# Patient Record
Sex: FEMALE | Race: ASIAN | NOT HISPANIC OR LATINO | Employment: OTHER | URBAN - METROPOLITAN AREA
[De-identification: names, ages, dates, MRNs, and addresses within clinical notes are randomized per-mention and may not be internally consistent; named-entity substitution may affect disease eponyms.]

---

## 2023-11-24 ENCOUNTER — HOSPITAL ENCOUNTER (EMERGENCY)
Facility: HOSPITAL | Age: 23
Discharge: HOME | End: 2023-11-25
Attending: EMERGENCY MEDICINE
Payer: MEDICAID

## 2023-11-24 DIAGNOSIS — O20.0 THREATENED MISCARRIAGE: Primary | ICD-10-CM

## 2023-11-24 PROCEDURE — 96360 HYDRATION IV INFUSION INIT: CPT

## 2023-11-24 PROCEDURE — 25000003 PHARM REV CODE 250: Performed by: NURSE PRACTITIONER

## 2023-11-24 PROCEDURE — 85025 COMPLETE CBC W/AUTO DIFF WBC: CPT | Performed by: NURSE PRACTITIONER

## 2023-11-24 PROCEDURE — 81025 URINE PREGNANCY TEST: CPT | Performed by: NURSE PRACTITIONER

## 2023-11-24 PROCEDURE — 99284 EMERGENCY DEPT VISIT MOD MDM: CPT | Mod: 25

## 2023-11-24 PROCEDURE — 81001 URINALYSIS AUTO W/SCOPE: CPT | Performed by: NURSE PRACTITIONER

## 2023-11-24 PROCEDURE — 86901 BLOOD TYPING SEROLOGIC RH(D): CPT | Performed by: NURSE PRACTITIONER

## 2023-11-24 PROCEDURE — 80053 COMPREHEN METABOLIC PANEL: CPT | Performed by: NURSE PRACTITIONER

## 2023-11-24 PROCEDURE — 84702 CHORIONIC GONADOTROPIN TEST: CPT | Performed by: NURSE PRACTITIONER

## 2023-11-24 PROCEDURE — 96361 HYDRATE IV INFUSION ADD-ON: CPT

## 2023-11-24 RX ADMIN — SODIUM CHLORIDE 1000 ML: 0.9 INJECTION, SOLUTION INTRAVENOUS at 11:11

## 2023-11-25 VITALS
HEART RATE: 88 BPM | TEMPERATURE: 99 F | OXYGEN SATURATION: 100 % | SYSTOLIC BLOOD PRESSURE: 117 MMHG | HEIGHT: 61 IN | BODY MASS INDEX: 26.43 KG/M2 | WEIGHT: 140 LBS | RESPIRATION RATE: 17 BRPM | DIASTOLIC BLOOD PRESSURE: 76 MMHG

## 2023-11-25 LAB
ABO + RH BLD: NORMAL
ALBUMIN SERPL BCP-MCNC: 4.8 G/DL (ref 3.5–5.2)
ALP SERPL-CCNC: 47 U/L (ref 55–135)
ALT SERPL W/O P-5'-P-CCNC: 52 U/L (ref 10–44)
ANION GAP SERPL CALC-SCNC: 9 MMOL/L (ref 8–16)
AST SERPL-CCNC: 28 U/L (ref 10–40)
B-HCG UR QL: POSITIVE
BACTERIA #/AREA URNS HPF: NEGATIVE /HPF
BASOPHILS # BLD AUTO: 0.08 K/UL (ref 0–0.2)
BASOPHILS NFR BLD: 0.6 % (ref 0–1.9)
BILIRUB SERPL-MCNC: 0.3 MG/DL (ref 0.1–1)
BILIRUB UR QL STRIP: NEGATIVE
BUN SERPL-MCNC: 11 MG/DL (ref 6–20)
CALCIUM SERPL-MCNC: 10 MG/DL (ref 8.7–10.5)
CHLORIDE SERPL-SCNC: 103 MMOL/L (ref 95–110)
CLARITY UR: CLEAR
CO2 SERPL-SCNC: 25 MMOL/L (ref 23–29)
COLOR UR: COLORLESS
CREAT SERPL-MCNC: 0.7 MG/DL (ref 0.5–1.4)
CTP QC/QA: YES
DIFFERENTIAL METHOD: ABNORMAL
EOSINOPHIL # BLD AUTO: 0.4 K/UL (ref 0–0.5)
EOSINOPHIL NFR BLD: 3 % (ref 0–8)
ERYTHROCYTE [DISTWIDTH] IN BLOOD BY AUTOMATED COUNT: 12.3 % (ref 11.5–14.5)
EST. GFR  (NO RACE VARIABLE): >60 ML/MIN/1.73 M^2
GLUCOSE SERPL-MCNC: 87 MG/DL (ref 70–110)
GLUCOSE UR QL STRIP: NEGATIVE
HCG INTACT+B SERPL-ACNC: 8896.12 MIU/ML
HCT VFR BLD AUTO: 42.6 % (ref 37–48.5)
HGB BLD-MCNC: 14.8 G/DL (ref 12–16)
HGB UR QL STRIP: ABNORMAL
HYALINE CASTS #/AREA URNS LPF: 1 /LPF
IMM GRANULOCYTES # BLD AUTO: 0.07 K/UL (ref 0–0.04)
IMM GRANULOCYTES NFR BLD AUTO: 0.5 % (ref 0–0.5)
KETONES UR QL STRIP: NEGATIVE
LEUKOCYTE ESTERASE UR QL STRIP: ABNORMAL
LYMPHOCYTES # BLD AUTO: 3.4 K/UL (ref 1–4.8)
LYMPHOCYTES NFR BLD: 26 % (ref 18–48)
MCH RBC QN AUTO: 30.2 PG (ref 27–31)
MCHC RBC AUTO-ENTMCNC: 34.7 G/DL (ref 32–36)
MCV RBC AUTO: 87 FL (ref 82–98)
MICROSCOPIC COMMENT: ABNORMAL
MONOCYTES # BLD AUTO: 0.9 K/UL (ref 0.3–1)
MONOCYTES NFR BLD: 6.9 % (ref 4–15)
NEUTROPHILS # BLD AUTO: 8.3 K/UL (ref 1.8–7.7)
NEUTROPHILS NFR BLD: 63 % (ref 38–73)
NITRITE UR QL STRIP: NEGATIVE
NRBC BLD-RTO: 0 /100 WBC
PH UR STRIP: 7 [PH] (ref 5–8)
PLATELET # BLD AUTO: 388 K/UL (ref 150–450)
PMV BLD AUTO: 8.6 FL (ref 9.2–12.9)
POTASSIUM SERPL-SCNC: 3.5 MMOL/L (ref 3.5–5.1)
PROT SERPL-MCNC: 7.6 G/DL (ref 6–8.4)
PROT UR QL STRIP: NEGATIVE
RBC # BLD AUTO: 4.9 M/UL (ref 4–5.4)
RBC #/AREA URNS HPF: 9 /HPF (ref 0–4)
SODIUM SERPL-SCNC: 137 MMOL/L (ref 136–145)
SP GR UR STRIP: 1 (ref 1–1.03)
SQUAMOUS #/AREA URNS HPF: 4 /HPF
URN SPEC COLLECT METH UR: ABNORMAL
UROBILINOGEN UR STRIP-ACNC: NEGATIVE EU/DL
WBC # BLD AUTO: 13.13 K/UL (ref 3.9–12.7)
WBC #/AREA URNS HPF: 8 /HPF (ref 0–5)

## 2023-11-25 NOTE — ED PROVIDER NOTES
Encounter Date: 2023       History     Chief Complaint   Patient presents with    Vaginal Bleeding     Light bleeding only when wiping. 5 wks pregnant. No cramping.     23-year-old  UPT positive with prenatal care last LMP 2023 proximally 5-6 weeks.  Patient followed by Dr. Levi.  With history of anemia.  Recently treated for chlamydia proximally 1 week ago.  Patient presents emergency department with complaint of vaginal spotting which was noted today.  Patient states she was concerned decided come to the emergency department further evaluation.  States spotting has stopped however she wanted to just be certain that there was no problems.  She denies abdominal pain, no abdominal cramping, no back pain, no fever, no recent illness, denies any other constitutional symptoms.        Review of patient's allergies indicates:  No Known Allergies  No past medical history on file.  No past surgical history on file.  No family history on file.     Review of Systems   Constitutional:  Negative for fever.   HENT:  Negative for sore throat.    Respiratory:  Negative for shortness of breath.    Cardiovascular:  Negative for chest pain.   Gastrointestinal:  Negative for nausea and vomiting.   Genitourinary:  Positive for vaginal bleeding. Negative for decreased urine volume, dysuria, flank pain, vaginal discharge and vaginal pain.   Musculoskeletal:  Negative for back pain.   Skin:  Negative for rash.   Neurological:  Negative for weakness.   Hematological:  Does not bruise/bleed easily.       Physical Exam     Initial Vitals [23 2217]   BP Pulse Resp Temp SpO2   (!) 142/98 90 17 98.8 °F (37.1 °C) 98 %      MAP       --         Physical Exam    Nursing note and vitals reviewed.  Constitutional: She appears well-developed and well-nourished.   HENT:   Head: Normocephalic and atraumatic.   Nose: Nose normal.   Mouth/Throat: Oropharynx is clear and moist.   Eyes: Conjunctivae and EOM are normal. Pupils  are equal, round, and reactive to light.   Neck: Neck supple. No thyromegaly present. No tracheal deviation present.   Normal range of motion.  Cardiovascular:  Normal rate, regular rhythm, normal heart sounds and intact distal pulses.     Exam reveals no gallop and no friction rub.       No murmur heard.  Pulmonary/Chest: Breath sounds normal. No stridor. No respiratory distress.   Abdominal: Abdomen is soft. Bowel sounds are normal. She exhibits no mass. There is no rebound and no guarding.   Musculoskeletal:         General: No edema. Normal range of motion.      Cervical back: Normal range of motion and neck supple.     Lymphadenopathy:     She has no cervical adenopathy.   Neurological: She is alert and oriented to person, place, and time. She has normal strength and normal reflexes. GCS score is 15. GCS eye subscore is 4. GCS verbal subscore is 5. GCS motor subscore is 6.   Skin: Skin is warm and dry. Capillary refill takes less than 2 seconds.   Psychiatric: She has a normal mood and affect.         ED Course   Procedures  Labs Reviewed   CBC W/ AUTO DIFFERENTIAL - Abnormal; Notable for the following components:       Result Value    WBC 13.13 (*)     MPV 8.6 (*)     Gran # (ANC) 8.3 (*)     Immature Grans (Abs) 0.07 (*)     All other components within normal limits    Narrative:     Release to patient->Immediate   COMPREHENSIVE METABOLIC PANEL - Abnormal; Notable for the following components:    Alkaline Phosphatase 47 (*)     ALT 52 (*)     All other components within normal limits    Narrative:     Release to patient->Immediate   URINALYSIS, REFLEX TO URINE CULTURE - Abnormal; Notable for the following components:    Color, UA Colorless (*)     Occult Blood UA 3+ (*)     Leukocytes, UA 1+ (*)     All other components within normal limits    Narrative:     Specimen Source->Urine   URINALYSIS MICROSCOPIC - Abnormal; Notable for the following components:    RBC, UA 9 (*)     WBC, UA 8 (*)     All other  components within normal limits    Narrative:     Specimen Source->Urine   POCT URINE PREGNANCY - Abnormal; Notable for the following components:    POC Preg Test, Ur Positive (*)     All other components within normal limits   HCG, QUANTITATIVE    Narrative:     Release to patient->Immediate   GROUP & RH          Imaging Results              US OB <14 Wks TransAbd & TransVag, Single Gestation (XPD) (Final result)  Result time 11/25/23 00:49:23      Final result by Maribell Desouza MD (11/25/23 00:49:23)                   Narrative:    PROCEDURE:  US OB LIMITED WITH TRANSVAGINAL  dated  11/24/2023 11:19 PM CST    CLINICAL HISTORY:   Female 23 years of age.   Vag Bleeding    COMPARISON: No prior similar    TECHNIQUE: Transabdominal and transvaginal ultrasound was performed of the gravid uterus    FINDINGS:  Urinary bladder is mildly filled.  Uterus measures 7.9 cm x 4.5 cm x 5.4 cm on transabdominal scanning.  There is a single intrauterine gestational sac that contains a yolk sac but no fetal pole. The yolk sac is positioned in the lower uterine segment.  Estimated gestational age is 5 weeks +0 days (PADMINI July 26, 2024) based on mean sac size of 10 mm.  The right ovary is asymmetrically enlarged compared with the left ovary, with estimated volume of approximately 40 mL, compared with 7 mL the left ovary. Right ovarian follicles are larger than normal, and at least two contain dependent fluid.  Left ovary is normal and measures 3.4 cm x 1.9 cm x 2.0 cm. Doppler detects flow within each ovary.      IMPRESSION:    Early pregnancy.  Large right ovary, containing complex cysts. In the appropriate clinical setting, this would be suspicious for torsion.    I discussed findings with the emergency room physician at 12:35 AM CDT 11/25/2023.    Electronically signed by:  Maribell Sevilla MD  11/25/2023 12:49 AM CST Workstation: [id number]                                     Medications   sodium chloride 0.9% bolus 1,000 mL  1,000 mL (0 mLs Intravenous Stopped 23 0051)     Medical Decision Making             ED Course as of 23 0246   Sat 2023   0202 Patient seen evaluated emergency department.  Currently at this time patient found with intrauterine 5 week yolk sac with no fetal pole.  Patient states that bleeding had stopped prior to arrival to emergency department.  Repeat serial abdominal exam showed no evidence of abdominal pain.  Patient instructed to follow with gyn next week as scheduled.  She is to drink plenty of fluids.  Go on pelvic rest.  Return to emergency department if problems persist worsens or additional concerns including pain, fever, worsening vaginal bleeding. [RM]      ED Course User Index  [RM] Anatoliy Benoit MD               Medical Decision Making:   Initial Assessment:   23-year-old  UPT positive with prenatal care last LMP 2023 proximally 5-6 weeks.  Patient followed by Dr. Levi.  With history of anemia.  Recently treated for chlamydia proximally 1 week ago.  Patient presents emergency department with complaint of vaginal spotting which was noted today.  Patient states she was concerned decided come to the emergency department further evaluation.  States spotting has stopped however she wanted to just be certain that there was no problems.  She denies abdominal pain, no abdominal cramping, no back pain, no fever, no recent illness, denies any other constitutional symptoms.      Differential Diagnosis:   Threatened miscarriage, ectopic pregnancy, intrauterine pregnancy  Clinical Tests:   Lab Tests: Ordered and Reviewed  Radiological Study: Ordered and Reviewed          Clinical Impression:  Final diagnoses:  [O20.0] Threatened miscarriage (Primary)          ED Disposition Condition    Discharge Stable          ED Prescriptions    None       Follow-up Information       Follow up With Specialties Details Why Contact Millicent Braxton MD Obstetrics, Obstetrics and  Gynecology, Maternal and Fetal Medicine Schedule an appointment as soon as possible for a visit in 1 week For recheck/continuing care [address] [facility] [address]  [facility] [phone]  [phone]               Anatoliy Benoit MD  11/25/23 0034       Anatoliy Benoit MD  11/25/23 0246

## 2024-02-08 DIAGNOSIS — Z36.89 ENCOUNTER FOR FETAL ANATOMIC SURVEY: Primary | ICD-10-CM

## 2024-02-17 NOTE — PROGRESS NOTES
MATERNAL-FETAL MEDICINE   CONSULT NOTE    Provider requesting consultation: MD Amita  SUBJECTIVE:   Ms. Rosa Alvarez is a 23 y.o.  female with IUP at 17w5d who is seen in consultation by MFM for evaluation and management of:  Problem   Coagulation Defect Affecting Pregnancy, Antepartum   Short Cervix During Pregnancy in Second Trimester        Medication List with Changes/Refills   Current Medications    PRENATAL VIT/IRON FUM/FOLIC AC (PRENATAL 1+1 ORAL)    Take by mouth.     Review of patient's allergies indicates:  No Known Allergies  OB History    Para Term  AB Living   1             SAB IAB Ectopic Multiple Live Births                  # Outcome Date GA Lbr Graham/2nd Weight Sex Delivery Anes PTL Lv   1 Current              Past Medical History:   Diagnosis Date    Von Willebrand disease      History reviewed. No pertinent surgical history.  Family history: negative for birth defects, recurrent miscarriages, chromosomal abnormalities. Reports maternal grandmother experienced postpartum hemorrhages in her deliveries.  Social History     Tobacco Use    Smoking status: Former     Current packs/day: 0.00     Types: Cigarettes, Vaping with nicotine     Quit date: 2023     Years since quittin.2    Smokeless tobacco: Never     Review of patient's allergies indicates:  No Known Allergies  Objective:   /85   Pulse 81   Wt 69 kg (152 lb 1.9 oz)   BMI 28.74 kg/m²   Ultrasound performed. See viewpoint for full ultrasound report.  A vora living IUP is identified.   Fetal size is appropriate for established dating.   No fetal structural malformations are identified.   Cervical length by TV scan is shortened measuring 21.0 mm  Placental location is anterior without evidence of previa.     ASSESSMENT/PLAN:     23 y.o.  female with IUP at 17w5d     Coagulation defect affecting pregnancy, antepartum  Rosa Alvarez presents for a consultation secondary to concern for Von Willebrand's  disease.  She reports a history of recurrent nosebleeds that would last 1-2 hours and heavy menstrual cycle requiring 1-2 large pads per hour for the first 2-3 days. Per patient, she was diagnosed with a hemophilia by her PCP as a child. Her Von Willebrand work up is below.     Upon review of laboratory results from [facility]:  11/30/2023:  FVIII 74% and VWF Ag 81%- both within normal limits  Vwf ACTIVITY( vWF: RCO) 43% - slightly below normal( normal range )    1/10/24:  FVIII and vWF Ag 105% and 101%- within normal limits  vWF activity: 35%- low     Lab ja interpretation reports she does not meet criteria for vWD however may be at increased risk of bleeding. However, certain types of vWD cannot be ruled out.    She also requires additional laboratory evaluation: CBC, Platelets, Coagulation panel and further evaluation.  I discussed her case with Dr. Young with Hematology for further assistance. His team will place laboratory orders and have agreed to see tomorrow.     We will also attempt to obtain records from her PCP and laboratory evaluation from a nonpregnant state. Coagulation factors can often times be increased or decreased in pregnancy.     I briefly discussed the recommendations for von Willebrand's disease in pregnancy. This requires Hematology involvement to help discern the type of vWD that is present. The type is important in predicting clinical risk, counseling regarding inheritance risk, and management options to reduce bleeding complications. Von Willebrand factor (vWF) typically increases in pregnancy and complications in the antepartum period are rare.    Shortly after delivery, vWF falls quickly and can lead to immediate or delayed postpartum hemorrhage.      A high risk time for bleeding is approximately 1-2 weeks postpartum when FVIII and ristocetin co-factor levels drop to prepregnancy levels.       I have scheduled a follow up visit for completion of anatomy and MD visit with her.    If a coagulation deficiency is confirmed, further counseling and management for pregnancy is needed.    Recommendations:  Establish care with Dr. Young - Hematology  Laboratory evaluation  Regardless of a hematologic disorder diagnosis, given patient's history, she is at high risk for postpartum hemorrhage. Will make further recommendations to primary OB surrounding delivery.  Obtain baseline CBC, PT/PTT, fibrinogen,       Short cervix during pregnancy in second trimester  Short cervix-no prior  birth  On today's ultrasound, a short cervix is identified. I counseled the patient regarding the potential implications of a short cervix including the risk of  birth. We reviewed the decreased incidence of  birth in women with a short cervix without a history or  birth who were given nightly vaginal progesterone (prometrium 200mg). Patient was counseled to avoid heavy lifting and moderate to high impact exercise. Pelvic rest can be considered but does not affect  birth risk. Bedrest is not recommended due to risk of thromboembolism and no proven benefit with respect to  delivery. I counseled the patient regarding the signs and symptoms or  labor. We reviewed the thresholds of viability and reviewed prematurity complications. We discussed that based on her current cervical length and no history of a prior  birth that she is not currently a cerclage candidate. We discussed that if the cervix were to dilate, cerclage could be considered up until a gestational age of 22 weeks 6 days. Prometrium is contraindicated in those with a peanut allergy.    Recommendations:  Start vaginal progesterone 200 mg qhs nightly (a prescription was sent to her pharmacy on file)  Repeat cervical length in 1 week  Avoid moderate to high impact exercise and heavy lifting  Strict PTL precautions reviewed with patient  Follow-up with primary OB in 1-2 weeks cervical exam  If any concern for  cervical dilation prior to 22 weeks and 6 days, and patient does not have PTL symptoms or significant bleeding, please contact MFM for evaluation for physical exam indicated cerclage      FOLLOW UP:     F/u in 4 weeks for US/MFM visit    Jodi Yates  Maternal-Fetal Medicine    Electronically Signed by Jodi Yates February 19, 2024

## 2024-02-19 ENCOUNTER — PROCEDURE VISIT (OUTPATIENT)
Dept: MATERNAL FETAL MEDICINE | Facility: CLINIC | Age: 24
End: 2024-02-19
Payer: MEDICAID

## 2024-02-19 ENCOUNTER — OFFICE VISIT (OUTPATIENT)
Dept: MATERNAL FETAL MEDICINE | Facility: CLINIC | Age: 24
End: 2024-02-19
Payer: MEDICAID

## 2024-02-19 VITALS
SYSTOLIC BLOOD PRESSURE: 136 MMHG | BODY MASS INDEX: 28.74 KG/M2 | HEART RATE: 81 BPM | WEIGHT: 152.13 LBS | DIASTOLIC BLOOD PRESSURE: 85 MMHG

## 2024-02-19 DIAGNOSIS — Z36.89 ENCOUNTER FOR FETAL ANATOMIC SURVEY: ICD-10-CM

## 2024-02-19 DIAGNOSIS — O99.119 COAGULATION DEFECT AFFECTING PREGNANCY, ANTEPARTUM: Primary | ICD-10-CM

## 2024-02-19 DIAGNOSIS — D68.9 COAGULATION DEFECT AFFECTING PREGNANCY, ANTEPARTUM: Primary | ICD-10-CM

## 2024-02-19 DIAGNOSIS — O26.872 SHORT CERVIX DURING PREGNANCY IN SECOND TRIMESTER: ICD-10-CM

## 2024-02-19 PROBLEM — D69.9 BLEEDING DISORDER: Status: ACTIVE | Noted: 2024-02-19

## 2024-02-19 PROCEDURE — 3008F BODY MASS INDEX DOCD: CPT | Mod: CPTII,,, | Performed by: STUDENT IN AN ORGANIZED HEALTH CARE EDUCATION/TRAINING PROGRAM

## 2024-02-19 PROCEDURE — 99205 OFFICE O/P NEW HI 60 MIN: CPT | Mod: S$PBB,TH,, | Performed by: STUDENT IN AN ORGANIZED HEALTH CARE EDUCATION/TRAINING PROGRAM

## 2024-02-19 PROCEDURE — 76805 OB US >/= 14 WKS SNGL FETUS: CPT | Mod: PBBFAC,PN | Performed by: STUDENT IN AN ORGANIZED HEALTH CARE EDUCATION/TRAINING PROGRAM

## 2024-02-19 PROCEDURE — 3079F DIAST BP 80-89 MM HG: CPT | Mod: CPTII,,, | Performed by: STUDENT IN AN ORGANIZED HEALTH CARE EDUCATION/TRAINING PROGRAM

## 2024-02-19 PROCEDURE — 99417 PROLNG OP E/M EACH 15 MIN: CPT | Mod: S$PBB,,, | Performed by: STUDENT IN AN ORGANIZED HEALTH CARE EDUCATION/TRAINING PROGRAM

## 2024-02-19 PROCEDURE — 3075F SYST BP GE 130 - 139MM HG: CPT | Mod: CPTII,,, | Performed by: STUDENT IN AN ORGANIZED HEALTH CARE EDUCATION/TRAINING PROGRAM

## 2024-02-19 PROCEDURE — 1159F MED LIST DOCD IN RCRD: CPT | Mod: CPTII,,, | Performed by: STUDENT IN AN ORGANIZED HEALTH CARE EDUCATION/TRAINING PROGRAM

## 2024-02-19 PROCEDURE — 99212 OFFICE O/P EST SF 10 MIN: CPT | Mod: PBBFAC,TH,PN,25 | Performed by: STUDENT IN AN ORGANIZED HEALTH CARE EDUCATION/TRAINING PROGRAM

## 2024-02-19 PROCEDURE — 99999 PR PBB SHADOW E&M-EST. PATIENT-LVL II: CPT | Mod: PBBFAC,,, | Performed by: STUDENT IN AN ORGANIZED HEALTH CARE EDUCATION/TRAINING PROGRAM

## 2024-02-19 RX ORDER — PROGESTERONE 200 MG/1
200 CAPSULE ORAL NIGHTLY
Qty: 60 CAPSULE | Refills: 3 | Status: ON HOLD | OUTPATIENT
Start: 2024-02-19 | End: 2024-06-03 | Stop reason: HOSPADM

## 2024-02-19 NOTE — ASSESSMENT & PLAN NOTE
Short cervix-no prior  birth  On today's ultrasound, a short cervix is identified. I counseled the patient regarding the potential implications of a short cervix including the risk of  birth. We reviewed the decreased incidence of  birth in women with a short cervix without a history or  birth who were given nightly vaginal progesterone (prometrium 200mg). Patient was counseled to avoid heavy lifting and moderate to high impact exercise. Pelvic rest can be considered but does not affect  birth risk. Bedrest is not recommended due to risk of thromboembolism and no proven benefit with respect to  delivery. I counseled the patient regarding the signs and symptoms or  labor. We reviewed the thresholds of viability and reviewed prematurity complications. We discussed that based on her current cervical length and no history of a prior  birth that she is not currently a cerclage candidate. We discussed that if the cervix were to dilate, cerclage could be considered up until a gestational age of 22 weeks 6 days. Prometrium is contraindicated in those with a peanut allergy.    Recommendations:  Start vaginal progesterone 200 mg qhs nightly (a prescription was sent to her pharmacy on file)  Repeat cervical length in 1 week  Avoid moderate to high impact exercise and heavy lifting  Strict PTL precautions reviewed with patient  Follow-up with primary OB in 1-2 weeks cervical exam  If any concern for cervical dilation prior to 22 weeks and 6 days, and patient does not have PTL symptoms or significant bleeding, please contact M for evaluation for physical exam indicated cerclage

## 2024-02-19 NOTE — ASSESSMENT & PLAN NOTE
Rosa Alvarez presents for a consultation secondary to concern for Von Willebrand's disease.  She reports a history of recurrent nosebleeds that would last 1-2 hours and heavy menstrual cycle requiring 1-2 large pads per hour for the first 2-3 days. Per patient, she was diagnosed with a hemophilia by her PCP as a child. Her Von Willebrand work up is below.     Upon review of laboratory results from [facility]:  11/30/2023:  FVIII 74% and VWF Ag 81%- both within normal limits  Vwf ACTIVITY( vWF: RCO) 43% - slightly below normal( normal range )    1/10/24:  FVIII and vWF Ag 105% and 101%- within normal limits  vWF activity: 35%- low     Lab ja interpretation reports she does not meet criteria for vWD however may be at increased risk of bleeding. However, certain types of vWD cannot be ruled out.    She also requires additional laboratory evaluation: CBC, Platelets, Coagulation panel and further evaluation.  I discussed her case with Dr. Young with Hematology for further assistance. His team will place laboratory orders and have agreed to see tomorrow.     We will also attempt to obtain records from her PCP and laboratory evaluation from a nonpregnant state. Coagulation factors can often times be increased or decreased in pregnancy.     I briefly discussed the recommendations for von Willebrand's disease in pregnancy. This requires Hematology involvement to help discern the type of vWD that is present. The type is important in predicting clinical risk, counseling regarding inheritance risk, and management options to reduce bleeding complications. Von Willebrand factor (vWF) typically increases in pregnancy and complications in the antepartum period are rare.    Shortly after delivery, vWF falls quickly and can lead to immediate or delayed postpartum hemorrhage.      A high risk time for bleeding is approximately 1-2 weeks postpartum when FVIII and ristocetin co-factor levels drop to prepregnancy levels.       I  have scheduled a follow up visit for completion of anatomy and MD visit with her.   If a coagulation deficiency is confirmed, further counseling and management for pregnancy is needed.    Recommendations:  Establish care with Dr. Young - Hematology  Laboratory evaluation  Regardless of a hematologic disorder diagnosis, given patient's history, she is at high risk for postpartum hemorrhage. Will make further recommendations to primary OB surrounding delivery.  Obtain baseline CBC, PT/PTT, fibrinogen,

## 2024-02-19 NOTE — PROGRESS NOTES
"Pt states she was having prolonged nose bleeds ("that would last for hours") and heavy menstrual bleeding x 4 + days during menstruation. Pt states primary care doctor ordered VonWillebrand studies due to her symptoms.   Pt reports symptoms have improved with pregnancy.   Pt denies leaking fluid or vaginal bleeding.  "

## 2024-02-22 NOTE — PROGRESS NOTES
Lavinia with Dr. Gonzalez office calls. Dr Levi would like to transfer OB to [facility] at this time.

## 2024-02-23 ENCOUNTER — TELEPHONE (OUTPATIENT)
Dept: OBSTETRICS AND GYNECOLOGY | Facility: CLINIC | Age: 24
End: 2024-02-23
Payer: MEDICAID

## 2024-02-23 NOTE — TELEPHONE ENCOUNTER
----- Message from Joaquim Dale MD sent at 2/22/2024  5:05 PM CST -----  Regarding: RE: Pt transfer  Please contact this patient and set her up to be seen for OB evaluation.  In addition contact [facility] and obtain her records  ----- Message -----  From: Jessika Cooper RN  Sent: 2/22/2024   3:30 PM CST  To: Joaquim Dale MD; Jie Montes MD  Subject: Pt transfer                                      Good Afternoon,  This patient was seen in our office at Maternal Fetal Medicine on Tuesday, 2/20/24.   She is in her second trimester and was referred by Dr. Levi.   The patient has a history of prolonged nose bleeds and heavy menstrual bleeding prior to her pregnancy. The patient was sent for VonWillebrands, but the pt reports she has hemophilia. Since her visit with us, she has seen Dr. Juan Young, hematology. We do not yet have that report.     Dr. Levi asked that she transfer OB care to [facility].  Dr. Levi's office informed the patient, the referral for Ob/gyn is in.   I have requested her records be faxed to me so I can scan them into her chart.     Thank you,  TEJAS Rosen, BSN  Maternal Fetal Medicine

## 2024-02-26 ENCOUNTER — TELEPHONE (OUTPATIENT)
Dept: OBSTETRICS AND GYNECOLOGY | Facility: CLINIC | Age: 24
End: 2024-02-26
Payer: MEDICAID

## 2024-02-26 NOTE — TELEPHONE ENCOUNTER
Spoke with pt to schedule appt. Pt is currently 18w pregnant and wants to establish care with us. Pt was informed that she'd have to see our PA first and for her next visits, she can see one of our providers. Pt was also informed that if she should get her records from the provider she's been seeing. Pt was given office fax #. Appt with Lea is 3/4/24 @ 11:20 am. Pt verbalized understanding.

## 2024-02-26 NOTE — TELEPHONE ENCOUNTER
----- Message from Pilo Miranda sent at 2/24/2024 10:51 AM CST -----  Type:  Sooner Appointment Request  Caller is requesting a sooner appointment.  Caller declined first available appointment listed below.  Caller will not accept being placed on the waitlist and is requesting a message be sent to doctor.    Name of Caller:  pt     When is the first available appointment?  april    Would the patient rather a call back or a response via [facility]? Call back     Best Call Back Number:  [phone]    Additional Information:  Pt is calling to schedule ryder and discuss care. Please call back to advise, thanks!

## 2024-02-26 NOTE — TELEPHONE ENCOUNTER
Contacted pt and confirmed appt with Lea 03/04/24. Also scheduled appt with MD 03/06/24 and pt voiced understanding.

## 2024-02-26 NOTE — TELEPHONE ENCOUNTER
----- Message from Joaquim Dale MD sent at 2/26/2024 12:58 PM CST -----  Regarding: OB transfer patient  This patient is a high-risk transfer from another [facility] practice.  It looks like she is on the scheduled to see Lea on March 4th.    Go ahead and have Lea see her on the 4th but also put her on my schedule for the 5th or 6th  We need to get her into the system quickly.

## 2024-02-27 ENCOUNTER — PROCEDURE VISIT (OUTPATIENT)
Dept: MATERNAL FETAL MEDICINE | Facility: CLINIC | Age: 24
End: 2024-02-27
Payer: MEDICAID

## 2024-02-27 DIAGNOSIS — O26.872 SHORT CERVIX IN SECOND TRIMESTER, ANTEPARTUM: ICD-10-CM

## 2024-02-27 PROCEDURE — 76815 OB US LIMITED FETUS(S): CPT | Mod: 26,S$PBB,, | Performed by: STUDENT IN AN ORGANIZED HEALTH CARE EDUCATION/TRAINING PROGRAM

## 2024-02-27 PROCEDURE — 76817 TRANSVAGINAL US OBSTETRIC: CPT | Mod: 26,S$PBB,, | Performed by: STUDENT IN AN ORGANIZED HEALTH CARE EDUCATION/TRAINING PROGRAM

## 2024-02-27 PROCEDURE — 76805 OB US >/= 14 WKS SNGL FETUS: CPT | Mod: PBBFAC,PN | Performed by: STUDENT IN AN ORGANIZED HEALTH CARE EDUCATION/TRAINING PROGRAM

## 2024-02-27 NOTE — PROGRESS NOTES
"Received Appt notes and lab results from Hemtology appt for MFM review. Scanned into chart.   Pt questions answered regarding OB, Hematology, MFM care. Explained MFM care will continue in addition to OB care. OB will deliver the baby with MFM recommendations.   Pt has appt with new OB (Dr. Dale). Pt prefers a female but is willing to "go and see how it goes". Pt states she prefers a female, but "my mom said a male DrCastro Is better." Instructed pt to notify OB if she would like to be seen by a female OB within the practice.    "

## 2024-03-04 ENCOUNTER — OFFICE VISIT (OUTPATIENT)
Dept: OBSTETRICS AND GYNECOLOGY | Facility: CLINIC | Age: 24
End: 2024-03-04
Payer: MEDICAID

## 2024-03-04 VITALS
DIASTOLIC BLOOD PRESSURE: 72 MMHG | WEIGHT: 156.5 LBS | HEART RATE: 101 BPM | SYSTOLIC BLOOD PRESSURE: 112 MMHG | HEIGHT: 61 IN | BODY MASS INDEX: 29.55 KG/M2

## 2024-03-04 DIAGNOSIS — Z3A.19 19 WEEKS GESTATION OF PREGNANCY: Primary | ICD-10-CM

## 2024-03-04 PROCEDURE — 1160F RVW MEDS BY RX/DR IN RCRD: CPT | Mod: CPTII,,, | Performed by: STUDENT IN AN ORGANIZED HEALTH CARE EDUCATION/TRAINING PROGRAM

## 2024-03-04 PROCEDURE — 99213 OFFICE O/P EST LOW 20 MIN: CPT | Mod: PBBFAC,PO | Performed by: STUDENT IN AN ORGANIZED HEALTH CARE EDUCATION/TRAINING PROGRAM

## 2024-03-04 PROCEDURE — 99213 OFFICE O/P EST LOW 20 MIN: CPT | Mod: TH,S$PBB,, | Performed by: STUDENT IN AN ORGANIZED HEALTH CARE EDUCATION/TRAINING PROGRAM

## 2024-03-04 PROCEDURE — 3008F BODY MASS INDEX DOCD: CPT | Mod: CPTII,,, | Performed by: STUDENT IN AN ORGANIZED HEALTH CARE EDUCATION/TRAINING PROGRAM

## 2024-03-04 PROCEDURE — 99999 PR PBB SHADOW E&M-EST. PATIENT-LVL III: CPT | Mod: PBBFAC,,, | Performed by: STUDENT IN AN ORGANIZED HEALTH CARE EDUCATION/TRAINING PROGRAM

## 2024-03-04 PROCEDURE — 1159F MED LIST DOCD IN RCRD: CPT | Mod: CPTII,,, | Performed by: STUDENT IN AN ORGANIZED HEALTH CARE EDUCATION/TRAINING PROGRAM

## 2024-03-04 PROCEDURE — 3078F DIAST BP <80 MM HG: CPT | Mod: CPTII,,, | Performed by: STUDENT IN AN ORGANIZED HEALTH CARE EDUCATION/TRAINING PROGRAM

## 2024-03-04 PROCEDURE — 3074F SYST BP LT 130 MM HG: CPT | Mod: CPTII,,, | Performed by: STUDENT IN AN ORGANIZED HEALTH CARE EDUCATION/TRAINING PROGRAM

## 2024-03-04 NOTE — PROGRESS NOTES
OB INTAKE APPOINTMENT    23 y.o. presents today for her OB Intake Appointment.    She is transferring her care and her records were received last week. Pertinent labs and imaging are noted below.   She is accompanied by her mother.   -------------------------------------------------------------------------------------------------------------------------     PREGNANCY DATINw 5d EGA today    OB US  23 GA:  6w1d PADMINI: 2024  FHT +  Cervix long and closed       OB US  24 GA: 18w 1 d PADMINI: 2024  Fetal profile/nasal bone suboptimal due to fetal position  All other anatomy imaged and appears normal  Male fetus  Cervix = 2.5 cm      Fetal Heart Tones with Doppler:   140-150s bpm     OB HISTORY:    Term deliveries: 0    (<37wks) deliveries: 0   Vaginal deliveries: 0   C-sections: 0   Miscarriages (<20wks): 0   Stillbirths (>20wks): 0   Medical Terminations: 0   Surgical Terminations: 0   Ectopic pregnancies: 0   Living children: 0      =      MEDICATIONS CURRENTLY TAKING:  Prenatal   Progesterone nightly inserted vaginally.      CARRIER SCREENING PREVIOUSLY DONE:  Cystic Fibrosis, Spinal Muscular Atrophy, Fragile X:  Not previously tested  Hemoglobinopathies: Not previously tested  Other:   Jas and Panorama Testing  24  Low risk for trisomy 21, trisomy 18, trisomy 13, monosomy X, and triploidy     FAMILY LINEAGE AND HISTORY:  Ashkenazi or North American Quaker:  No  Intellectual disability:  No  Premature ovarian failure (<40yoa):  No  Cajun or [place]:  No    CERVICAL CANCER SCREENING:  Latest PAP and/or HPV result: ASCUS, HPV negative (Date: 23)  Has the patient had any prior abnormal tests? Yes, recent ASCUS.      MISCELLANEOUS:  Does the patient have cats? No     ------------------------------------------------------------------------------------------------------------------------  RELEVANT ON RECORDS FROM FAXED DOCUMENTS:  Received 24    Prior OB:   Lab  "Results: 11/16/23  CBC, Platelet, No Differential   WBC 12.6   HIGH   RBC 4.88   Hemoglobin 14.4   Hematocrit 43.8   MCV 90   MCH 29.5   MCHC 32.9   RDW 12.1   Platelets 412     Chlamydia/GC amplification  Chlamydia trachomatis, BEBETO:  positive  Neisseria gonorrhea, BEBETO:  negative    ABO Grouping and Rho(D) Typing  ABO Grouping    B  RH factor:             positive    HCV Antibody RFX to Quant PCR  HCV antibody: non reactive    Thyroid labs (Free T4 and TSH) were normal.     hCH, Beta  240    Rubella antibodies:  2.25 (IMMUNE)    Negative for RPR and HIV    Antibody screen: negative  Progesterone: 15.9     HbsAg Screen: negative    Urine culture: mixed urogential stiven 10,000-25,000 colony forming units per mL      Von Willebrand Profile 1/12/24  Factor VII Activity: 105  von Willebrand factor (vWF): 101  vWF activity: 35 (LOW)  Interpretation:  The VWF:Ag is normal.  The VWF:RCO is slightly decreased.  The FVII is normal    12/13/23  Toxicology testing: No drugs detected.  Chlamydia testing: Negative      ------------------------------------------------------------------------------------------------------------------------    ASSESMENT & PLAN:  Pregnancy confirmed today with a positive pregnancy test.  Limited patient history and chart review completed as above.    Gave patient our OB Welcome Packet and reviewed its contents.  Our discussion included:  an overview of appointments, hydration / nutrition / weight gain advice, safe OTC medications, what substances and exposures to avoid, vaccine recommendations, advice for morning sickness, dental hygiene advice, recommendations regarding exercise, advice for travel in pregnancy, information about breastfeeding, postpartum birth control, and circumcision, pediatricians in the community, [facility] enrollment, how to contact us for concerns or problems during pregnancy, warning or danger signs.  Also provided [facility]'s publication, "Your Pregnancy from A-Z."    Advised " [facility] if not already [facility].    Advised patient to start a prenatal vitamin if not already taking.  It should contain 600mcg folic acid, 27mg iron, and 200mg DHA.  Other medication management: progesterone.    The patient watched ACOG's video about Genetic and Carrier Screening options in pregnancy.  Afterwards, this testing was discussed in-person, and she had an opportunity to ask questions.  She was encouraged to consider these optional tests and to review the detailed information available in the OB Welcome Packet. If she would like this testing done, we will order it at her NOB appointment (usually ~12wks).    Carrier Screening options (CF, SMA, Fragile X, and others) reviewed in detail with the patient.  Reviewed the diseases/disorders, the limitations of the tests, and what would happen in the event of a positive screen.  If patient would like this testing done, we will order it at her NOB appointment (usually ~12wks).  Detailed information is in the OB Welcome Packet for her to review at home.    Aneuploidy (Genetic) Screening with [id number] test reviewed in detail with the patient.  Reviewed the aneuploidies that it screens for and what would happen in the event of a positive screen.  If patient would like this testing done, we will order it at her NOB appointment (usually ~12wks).  Detailed information is in the OB Welcome Packet for her to review at home.    Routine OB labs were ordered, and the patient was directed to the lab.    A repeat anatomy dating ultrasound was scheduled for 3-11-24 by [facility].     The patient filled out a complete medical history form, which will be reviewed by one of our physicians after this appointment.  Next OB appointment has been scheduled for 3-6-24.   If the patient needs to be seen for care prior to that (based on her responses on the history form), our office will reach out to her and make adjustments.      Lea Talamantes PA-C  03/04/2024

## 2024-03-06 ENCOUNTER — ROUTINE PRENATAL (OUTPATIENT)
Dept: OBSTETRICS AND GYNECOLOGY | Facility: CLINIC | Age: 24
End: 2024-03-06
Payer: MEDICAID

## 2024-03-06 ENCOUNTER — PATIENT MESSAGE (OUTPATIENT)
Dept: OTHER | Facility: OTHER | Age: 24
End: 2024-03-06
Payer: MEDICAID

## 2024-03-06 VITALS
SYSTOLIC BLOOD PRESSURE: 120 MMHG | DIASTOLIC BLOOD PRESSURE: 70 MMHG | WEIGHT: 156.31 LBS | HEART RATE: 98 BPM | BODY MASS INDEX: 29.53 KG/M2

## 2024-03-06 DIAGNOSIS — O26.872 SHORT CERVIX DURING PREGNANCY IN SECOND TRIMESTER: ICD-10-CM

## 2024-03-06 DIAGNOSIS — Z3A.20 20 WEEKS GESTATION OF PREGNANCY: Primary | ICD-10-CM

## 2024-03-06 DIAGNOSIS — D68.9 COAGULATION DEFECT AFFECTING PREGNANCY, ANTEPARTUM: ICD-10-CM

## 2024-03-06 DIAGNOSIS — O99.119 COAGULATION DEFECT AFFECTING PREGNANCY, ANTEPARTUM: ICD-10-CM

## 2024-03-06 PROCEDURE — 87086 URINE CULTURE/COLONY COUNT: CPT | Performed by: OBSTETRICS & GYNECOLOGY

## 2024-03-06 PROCEDURE — 99215 OFFICE O/P EST HI 40 MIN: CPT | Mod: TH,S$PBB,, | Performed by: OBSTETRICS & GYNECOLOGY

## 2024-03-06 PROCEDURE — 99212 OFFICE O/P EST SF 10 MIN: CPT | Mod: PBBFAC,TH,PO | Performed by: OBSTETRICS & GYNECOLOGY

## 2024-03-06 PROCEDURE — 99999 PR PBB SHADOW E&M-EST. PATIENT-LVL II: CPT | Mod: PBBFAC,,, | Performed by: OBSTETRICS & GYNECOLOGY

## 2024-03-06 NOTE — Clinical Note
[facility] has taken over the primary care for this patient.  Thanks for your assistance.  Please keep me in the loop on any recommendations.  Thanks again.  SP

## 2024-03-06 NOTE — PROGRESS NOTES
"    Prenatal Note   Chief Complaint:  Routine Prenatal Visit       Patient ID: Rosa Alvarez is a  23 y.o. female.    Date: 2024     INITIAL OB EVALUATION    S/ 23 y.o. at 20-0/7 weeks who presents for OB evaluation.  She initially started her prenatal care with Dr. Gonzalez with [facility] (records available and reviewed).  She was transferred into the [facility] system due to her high-risk underlying potential bleeding disorder.  She reported issues with frequent nosebleeds and was seen by Hematology Oncology on 2024.  (Dr. Juan Young).  She reports as a child being diagnosed with "hemophilia" by her pediatrician.    Prior to today's evaluation she was seen by Maternal-Fetal Medicine with [facility]:  "Lab jake interpretation reports she does not meet criteria for vWD however may be at increased risk of bleeding. However, certain types of vWD cannot be ruled out.     She also requires additional laboratory evaluation: CBC, Platelets, Coagulation panel and further evaluation.  I discussed her case with Dr. Young with Hematology for further assistance. His team will place laboratory orders and have agreed to see tomorrow."    Results of her recent hematology evaluation are not available at this time.  The patient states she was diagnosed with platelet type 2 von Willebrand's disease.    She currently reports no significant obstetrical issues.    No bleeding.  No pelvic pain and no rupture of membranes..    O/  VITALS:  Weight:  156 lb  BP:  120/70    FH: 20 cm  FHT'S:  140s bpm by doppler     A&P  Intrauterine pregnancy at 20-0/7 weeks  Coagulation defect affecting pregnancy   Short cervix  History of chlamydia    The above was reviewed discussed with the patient.    The patient's chart as well as past medical surgical and obstetrical history were reviewed and discussed with the patient.      We reviewed her last menstrual and viability dating ultrasound results.  We discussed her estimated due " date of:  2024 based on the results of her first-trimester screening    Blood testing for aneuploidy screening as well as carrier status was reviewed and discussed.  The patient has never had carrier screening performed and would like to proceed with testing.    Her history of previous chlamydia appropriately treated per patient was discussed and a repeat test of cure was obtained today via urine.    The patient was given the [place] Prenatal /  depression scale test today.  She scored: 3:  Depression not likely    At today's visit, the patient completed the OB patient aspirin questionnaire.    Based on the results of the questionnaire, she is not a candidate for aspirin prophylaxis at this time.  This may change based on recommendations from Maternal-Fetal Medicine and Hematology/Oncology    Influenza vaccine (October to May) & COVID vaccine recommended any time in pregnancy    The patient is scheduled to be seen by Maternal-Fetal Medicine in the near future.  We will base further evaluation treatment regards to her coagulation issues on recommendations by Maternal-Fetal Medicine and Hematology Oncology.    Significance of the patient's shortened cervix and signs and symptoms of  labor were discussed.    The patient will follow up in two weeks.  We discussed the fact that during this time.  I will review her case with both Maternal-Fetal Medicine and Hematology Oncology.    The patient's questions regarding the above were answered and she is in agreement with the current plan.    See end of note for additional documentation   OB PROBLEM LIST:  Coagulation defect affecting pregnancy, antepartum  [ ] A high risk time for bleeding is approximately 1-2 weeks postpartum when FVIII and ristocetin co-factor levels drop to prepregnancy levels.     Short cervix during pregnancy in second trimester-no prior  birth  [ ] Started on vaginal progesterone 200 mg qhs nightly by [facility]  [ ] Serial  cervical length in 1 week  [ ] If any concern for cervical dilation prior to 22 weeks and 6 days, and patient does not have PTL symptoms or significant bleeding, please contact M for evaluation for physical exam indicated cerclage     FINAL EDC:    7/24/2024 by US done 11/30/2023 @ 6-1/7 week      SO Name: Pedrito Morfin ANATOMY SCAN:    2/22/2024  Single intrauterine pregnancy at 18-,1/7 weeks with normal anatomy but suboptimal views of profile and face.    Cervix was 2.5 cm.    Anterior placenta no previa    INITIAL LABS:  DATE: 11/15/2023  MBT: B positive  AB SCREEN: negative  RPR: negative  RUBELLA: Immune  HEPATITIS A/B/C: negative  HIV: negative  CBC: 2/20/2024 - 10.5 >--- 14.2 / 39.6 ---< 399  HGB:  Sickle cell negative  URINE CX: To Lab Today  UDS: Negative  Other:   TSH: 1.050   10-12 WEEK LABS:    PAP: ASCUS / HPV Negative  / Date: 11/20/2023    VALDO/CHLAM: chlam + / valdo - (the patient reports previous treatment with chlamydia [facility].  Test of cure pending)    cfDNA ([id number]): to lab today    CARRIER SCREEN (Inheritest Core): to lab today   28 WEEK LABS:    CBC:   1Hr OGTT:  Ferritin:      OTHER LABS:  GBS: ___   OTHER ULTRASOUNDS:     TO-DO THROUGHOUT PREGNANCY:    Depression Screen (20wks): ___    Rhogam: ___  Influenza vaccine (OCT-MAY): ___  TDAP (27-36wks): ___    Birth Plan: ___  Plans to breastfeed: ___  Plans for epidural: ___  Classes at hospital: ___    Postpartum contraception: ___  Consent for delivery: ___  TOLAC counseling: ___  BTL counseling: ___   MEDICATIONS:    Prenatal vitamins  Vaginal progesterone ALLERGIES:    NKDA    MEDICAL HISTORY:    Von Willebrand's disease     Pre-pregnancy BMI = 25.5 SURGICAL HISTORY:    Negative    SOCIAL HISTORY:    Smoker: non-smoker  Alcohol: denies  Drugs: denies  Relationship: single  Domestic Violence: no  Lives with: Mother & Fathere  Education Level: Some College  Occupation: homemaker  Yazidi: No preference  Other:   GYN HISTORY:    PAP'S:   ASCUS with Neg HPV  STI'S: recent diagnosis: chlamydia  GENITAL HSV: no FAMILY HISTORY:    HTN: No  DIABETES: No  BLEEDING D/O: No  CLOTTING D/O: No  BIRTH DEFECTS: No  MENTAL DISABILITY: No  TWINS OR MORE: No  GYN CANCER: No  Other:     OBSTETRIC HISTORY   Month/Year Mode of Delivery EGA Wt. M/F Complications / Comments                                             Plan:      20 weeks gestation of pregnancy  -     [id number] Plus W/ Ess & Sca T21, T18, T13, Xy & Ess; Future; Expected date: 03/06/2024  -     C. trachomatis/N. gonorrhoeae by [facility]; Urine  -     Urine culture    Coagulation defect affecting pregnancy, antepartum    Short cervix during pregnancy in second trimester         Follow up in about 2 weeks (around 3/20/2024) for Routine OB F/U or as needed.     Joaquim Dale MD  Department OBGYN  [facility]       CARRIER SCREENING PREVIOUSLY DONE:  Cystic Fibrosis, Spinal Muscular Atrophy, Fragile X:  Not previously tested  Hemoglobinopathies: Not previously tested  Other:   Jas and Panorama Testing  1/18/24  Low risk for trisomy 21, trisomy 18, trisomy 13, monosomy X, and triploidy     FAMILY LINEAGE AND HISTORY:  Ashkenazi or North American Congregation:  No  Intellectual disability:  No  Premature ovarian failure (<40yoa):  No  Cajun or [place]:  No     CERVICAL CANCER SCREENING:  Latest PAP and/or HPV result: ASCUS, HPV negative (Date: 11-22-23)  Has the patient had any prior abnormal tests? Yes, recent ASCUS.      MISCELLANEOUS:  Does the patient have cats? No

## 2024-03-07 LAB
BACTERIA UR CULT: NORMAL
BACTERIA UR CULT: NORMAL

## 2024-03-11 ENCOUNTER — TELEPHONE (OUTPATIENT)
Dept: OBSTETRICS AND GYNECOLOGY | Facility: CLINIC | Age: 24
End: 2024-03-11
Payer: MEDICAID

## 2024-03-11 ENCOUNTER — PROCEDURE VISIT (OUTPATIENT)
Dept: MATERNAL FETAL MEDICINE | Facility: CLINIC | Age: 24
End: 2024-03-11
Attending: FAMILY MEDICINE
Payer: MEDICAID

## 2024-03-11 ENCOUNTER — OFFICE VISIT (OUTPATIENT)
Dept: MATERNAL FETAL MEDICINE | Facility: CLINIC | Age: 24
End: 2024-03-11
Payer: MEDICAID

## 2024-03-11 VITALS
WEIGHT: 156.5 LBS | SYSTOLIC BLOOD PRESSURE: 127 MMHG | HEART RATE: 91 BPM | DIASTOLIC BLOOD PRESSURE: 72 MMHG | BODY MASS INDEX: 29.58 KG/M2

## 2024-03-11 DIAGNOSIS — O99.119 COAGULATION DEFECT AFFECTING PREGNANCY, ANTEPARTUM: Primary | ICD-10-CM

## 2024-03-11 DIAGNOSIS — Z36.89 ENCOUNTER FOR FETAL ANATOMIC SURVEY: ICD-10-CM

## 2024-03-11 DIAGNOSIS — O26.872 SHORT CERVIX IN SECOND TRIMESTER, ANTEPARTUM: ICD-10-CM

## 2024-03-11 DIAGNOSIS — D68.9 COAGULATION DEFECT AFFECTING PREGNANCY, ANTEPARTUM: Primary | ICD-10-CM

## 2024-03-11 DIAGNOSIS — O26.872 SHORT CERVIX DURING PREGNANCY IN SECOND TRIMESTER: ICD-10-CM

## 2024-03-11 PROCEDURE — 99999 PR PBB SHADOW E&M-EST. PATIENT-LVL II: CPT | Mod: PBBFAC,,, | Performed by: STUDENT IN AN ORGANIZED HEALTH CARE EDUCATION/TRAINING PROGRAM

## 2024-03-11 PROCEDURE — 76816 OB US FOLLOW-UP PER FETUS: CPT | Mod: 26,S$PBB,, | Performed by: STUDENT IN AN ORGANIZED HEALTH CARE EDUCATION/TRAINING PROGRAM

## 2024-03-11 PROCEDURE — 76817 TRANSVAGINAL US OBSTETRIC: CPT | Mod: PBBFAC | Performed by: STUDENT IN AN ORGANIZED HEALTH CARE EDUCATION/TRAINING PROGRAM

## 2024-03-11 PROCEDURE — 99214 OFFICE O/P EST MOD 30 MIN: CPT | Mod: S$PBB,TH,, | Performed by: STUDENT IN AN ORGANIZED HEALTH CARE EDUCATION/TRAINING PROGRAM

## 2024-03-11 PROCEDURE — 3078F DIAST BP <80 MM HG: CPT | Mod: CPTII,,, | Performed by: STUDENT IN AN ORGANIZED HEALTH CARE EDUCATION/TRAINING PROGRAM

## 2024-03-11 PROCEDURE — 99212 OFFICE O/P EST SF 10 MIN: CPT | Mod: PBBFAC,TH,PN,25 | Performed by: STUDENT IN AN ORGANIZED HEALTH CARE EDUCATION/TRAINING PROGRAM

## 2024-03-11 PROCEDURE — 3008F BODY MASS INDEX DOCD: CPT | Mod: CPTII,,, | Performed by: STUDENT IN AN ORGANIZED HEALTH CARE EDUCATION/TRAINING PROGRAM

## 2024-03-11 PROCEDURE — 1159F MED LIST DOCD IN RCRD: CPT | Mod: CPTII,,, | Performed by: STUDENT IN AN ORGANIZED HEALTH CARE EDUCATION/TRAINING PROGRAM

## 2024-03-11 PROCEDURE — 3074F SYST BP LT 130 MM HG: CPT | Mod: CPTII,,, | Performed by: STUDENT IN AN ORGANIZED HEALTH CARE EDUCATION/TRAINING PROGRAM

## 2024-03-11 NOTE — TELEPHONE ENCOUNTER
Contacted pt and she states she has not had genetic testing drawn yet. Pt to come to clinic tomorrow to get new order requisition.

## 2024-03-11 NOTE — PROGRESS NOTES
Maternal Fetal Medicine Follow up  SUBJECTIVE:     Rosa Alvarez is a 23 y.o.  female with IUP at 20w5d who is seen for MFM follow up for management of:    Problem   Coagulation Defect Affecting Pregnancy, Antepartum   Short Cervix During Pregnancy in Second Trimester     Previous notes reviewed.   No changes to medical, surgical, family, social, or obstetric history.      Current Outpatient Medications   Medication Instructions    prenatal vit/iron fum/folic ac (PRENATAL 1+1 ORAL) Oral    progesterone (PROMETRIUM) 200 mg, Vaginal, Nightly     Review of patient's allergies indicates:  No Known Allergies  Care team members:  [facility]  OBJECTIVE:   Blood Pressure: /72   Pulse 91   Wt 71 kg (156 lb 8.4 oz)   LMP 10/12/2023   BMI 29.58 kg/m²   Ultrasound performed. See viewpoint for full ultrasound report.  IUP at 20w5d  The fetal anatomic survey was completed today, and no fetal structural abnormalities were identified of the structures imaged today.   Interval fetal growth has been normal.  The cervical length is short and measures 19.6mm    Cervix closed/thick on exam today  ASSESSMENT/PLAN:     23 y.o.  female with IUP at 20w5d presents for MFM follow up.    Coagulation defect affecting pregnancy, antepartum  Currently being worked up by Hematology. Per patient, Dr. Young is testing her for platelet dominant vWD. Will follow up with his clinic once her labs have resulted.    Short cervix during pregnancy in second trimester  Cervix closed today  Will reassess in 2 weeks  Continue vaginal progesterone      FOLLOW UP:     F/u in 2 weeks for US/MFM visit    30 minutes of total time spent on the encounter, which includes face to face time and non-face to face time preparing to see the patient (eg, review of tests), obtaining and/or reviewing separately obtained history, documenting clinical information in the electronic or other health record, independently interpreting results (not  separately reported) and communicating results to the patient/family/caregiver, or care coordination (not separately reported).  Jodi Yates  Maternal-Fetal Medicine    Electronically Signed by Jodi Yates March 11, 2024

## 2024-03-11 NOTE — ASSESSMENT & PLAN NOTE
Currently being worked up by Hematology. Per patient, Dr. Young is testing her for platelet dominant vWD. Will follow up with his clinic once her labs have resulted.

## 2024-03-11 NOTE — TELEPHONE ENCOUNTER
----- Message from Joaquim Dale MD sent at 3/10/2024  3:19 PM CDT -----  Regarding: Genetic testing  On reviewing this patient's transfer medical records it looks like she did already have aneuploidy screening.  She still needs carrier screening.  Contact [facility] and if possible cancel the aneuploidy screening.

## 2024-03-11 NOTE — PROGRESS NOTES
Sterile lubricant and sterile gloves to exam room for SVE per Dr. Yates. Accompanied exam. Pt tolerated well.

## 2024-03-20 ENCOUNTER — PATIENT MESSAGE (OUTPATIENT)
Dept: OBSTETRICS AND GYNECOLOGY | Facility: CLINIC | Age: 24
End: 2024-03-20
Payer: MEDICAID

## 2024-03-21 ENCOUNTER — ROUTINE PRENATAL (OUTPATIENT)
Dept: OBSTETRICS AND GYNECOLOGY | Facility: CLINIC | Age: 24
End: 2024-03-21
Payer: MEDICAID

## 2024-03-21 VITALS
HEART RATE: 97 BPM | BODY MASS INDEX: 30.1 KG/M2 | WEIGHT: 159.31 LBS | DIASTOLIC BLOOD PRESSURE: 82 MMHG | SYSTOLIC BLOOD PRESSURE: 116 MMHG

## 2024-03-21 DIAGNOSIS — O09.90 SUPERVISION OF HIGH RISK PREGNANCY, ANTEPARTUM: Primary | ICD-10-CM

## 2024-03-21 LAB
C TRACH DNA SPEC QL NAA+PROBE: NOT DETECTED
N GONORRHOEA DNA SPEC QL NAA+PROBE: NOT DETECTED

## 2024-03-21 PROCEDURE — 99213 OFFICE O/P EST LOW 20 MIN: CPT | Mod: TH,S$PBB,, | Performed by: GENERAL PRACTICE

## 2024-03-21 PROCEDURE — 99212 OFFICE O/P EST SF 10 MIN: CPT | Mod: PBBFAC,TH,PO | Performed by: GENERAL PRACTICE

## 2024-03-21 PROCEDURE — 87491 CHLMYD TRACH DNA AMP PROBE: CPT | Performed by: GENERAL PRACTICE

## 2024-03-21 PROCEDURE — 99999 PR PBB SHADOW E&M-EST. PATIENT-LVL II: CPT | Mod: PBBFAC,,, | Performed by: GENERAL PRACTICE

## 2024-03-21 NOTE — PROGRESS NOTES
"  TODAY'S PROGRESS NOTE  2024    INDIANA Lee is a 23 y.o.  at 22w1d who presents for JENNIFER.  She denies VB, LOF, CTX's.  She reports normal FM.    Just got call from Dr. Young's office that her labs are still pending.  Has a 16 APR e-visit scheduled with him.    O/  VITALS:  Vitals:    24 1126   BP: 116/82   Pulse: 97       FH: U+2  DT'S: 130bpm by doppler    A/P  22w1d for JENNIFER, doing well.    Anatomy Scan results reviewed with the patient  CT/NG sent on urine today (last test for some reason is still pending)  Next cervical length 25 MAR (also has f/u with MFM that day in [place])  Lengthy discussion today about the fact that she might not be a candidate for epidural.  Encouraged her to enroll in Lamaze class at the hospital.  Encouraged her to get and read the HypnoBirthing book and practice its guided meditations.  SAB precautions reviewed  -RTC ~24wks EGA for JENNIFER, sooner pralethea KELLY MD   OB PROBLEM LIST:    Transfer from [facility]    Von Willebrand Disease, follows with Dr. Juan Young    Short cervix (no prior PTD)    [  ] vaginal prog 200mg QHS    [  ] serial CVX length    [  ] consider cerclage for cervical insufficiency <22+6wks    Chlamydia @ NOB    [  ] DOUGLAS    [  ] T3 STD testing   FINAL EDC:    2024 by US done 2023 @ 6-1/7 week    Baby: Boy "Thelma"    SO Name: Pedrito Morfin ANATOMY SCAN:    2024 @ 17+5wks: anatomy normal but suboptimal views of profile and face.    CVX 2.1 cm --> 1wk f/u  Anterior placenta no previa    24 @ 18+6wks: CVX 2.6cm --> 2wks f/u    3/11/24 @ 20+5wks: s=d, anatomy survey completed and wnl, CVX 2.0cm --> 2wks f/u      INITIAL LABS:  DATE: 11/15/2023  MBT: B positive  AB SCREEN: negative  RPR: negative  RUBELLA: Immune  HEPATITIS A/B/C: negative  HIV: negative  CBC: 2024 - 10.5 >--- 14.2 / 39.6 ---< 399  HGB: Sickle cell negative  URINE CX: neg (3/6/24)  UDS: Negative  Other:   TSH: 1.050   10-12 WEEK LABS:    PAP: ASCUS / HPV " Negative / Date: 11/20/2023    VALDO/CHLAM: chlam + / valdo -    cfDNA ([id number]): to lab today    CARRIER SCREEN (Inheritest Core): to lab today   28 WEEK LABS:    CBC:   1Hr OGTT:  Ferritin:      OTHER LABS:  GBS: ___    11/30/2023:  FVIII 74% and VWF Ag 81% - both wnl  Vwf ACTIVITY( vWF: RCO) 43% (normal range )     1/10/24:  FVIII and vWF Ag 105% and 101% - both wnl  vWF activity: 35% - low    OTHER ULTRASOUNDS:     TO-DO THROUGHOUT PREGNANCY:    Depression Screen: 3/6/24    Influenza vaccine (OCT-MAY): ___    TDAP (27-36wks): ___    Birth Plan: ___    Plans to breastfeed: yes    Plans for epidural: ? With vWF ?    Classes at hospital: offered 3/21/24    Postpartum contraception: ___  Consent for delivery: ___  BTL counseling: ___   MEDICATIONS:    Prenatal vitamins  Vaginal progesterone ALLERGIES:    NKDA    MEDICAL HISTORY:    Von Willebrand's disease     Pre-pregnancy BMI = 25.5 SURGICAL HISTORY:    Negative    SOCIAL HISTORY:    Smoker: non-smoker  Alcohol: denies  Drugs: denies  Relationship: single  Domestic Violence: no  Lives with: Mother & Fathere  Education Level: Some College  Occupation: homemaker  Congregation: No preference  Other:   GYN HISTORY:    PAP'S: ASCUS with Neg HPV  STI'S: recent diagnosis: chlamydia  GENITAL HSV: no FAMILY HISTORY:    HTN: No  DIABETES: No  BLEEDING D/O: No  CLOTTING D/O: No  BIRTH DEFECTS: No  MENTAL DISABILITY: No  TWINS OR MORE: No  GYN CANCER: No  Other:     OBSTETRIC HISTORY   Month/Year Mode of Delivery EGA Wt. M/F Complications / Comments             MFM NOTE (2/19/24)  VW DZ:  I briefly discussed the recommendations for von Willebrand's disease in pregnancy. This requires Hematology involvement to help discern the type of vWD that is present. The type is important in predicting clinical risk, counseling regarding inheritance risk, and management options to reduce bleeding complications. Von Willebrand factor (vWF) typically increases in pregnancy and  complications in the antepartum period are rare.  Shortly after delivery, vWF falls quickly and can lead to immediate or delayed postpartum hemorrhage.  A high risk time for bleeding is approximately 1-2 weeks postpartum when FVIII and ristocetin co-factor levels drop to prepregnancy levels.     SHORT CVX:  We reviewed the decreased incidence of  birth in women with a short cervix without a history or  birth who were given nightly vaginal progesterone (prometrium 200mg). Patient was counseled to avoid heavy lifting and moderate to high impact exercise. Pelvic rest can be considered but does not affect  birth risk. Bedrest is not recommended due to risk of thromboembolism and no proven benefit with respect to  delivery.

## 2024-03-21 NOTE — PATIENT INSTRUCTIONS
To schedule classes (see below for what's offered) at the hospital, call [phone].    Have a question or concern?    PRO TIP: Program these phone numbers in your cell phone!    for an emergency  call 911 or go to the nearest hospital Especially after 20 weeks of the pregnancy, please remember that  Labor & Delivery is at [facility].  There is no L&D at [facility] (formerly called [facility]).  After hours you can only access L&D through the Emergency Room (entrance on [facility]).   [facility] Nurse Care Advice Line  [phone] At any time during your pregnancy,  you can speak to a nurse 24-7.   for non-urgent issues, send us a  message in [employer] Consider calling the Nurse Care Advice Line if it's a weekend or  toward the end of the work-day since  [employer] and phone messages may not be answered for a day or two.   for non-urgent issues, call the clinic  [phone], Option 3    Labor & Delivery  [phone] Starting at 20 weeks of the pregnancy,  you can speak to a nurse on L&D 24-7.     SECOND TRIMESTER  14+0 - 28+6 weeks    Adapting to Pregnancy: Second Trimester   Keep up the healthy habits you started in your first trimester.  It's not too late to make better choices and drop bad habits - your baby's counting on you!  Most women enjoy this middle part of the pregnancy: first trimester fatigue and morning sickness are probably behind you, and your belly's not yet getting in the way physically.    Your To-Do List  There are several things you should start working on.  More information on these topics can be found in your OB Welcome Packet and the A-Z Book.    Choose a pediatrician for your baby.  Sign up for a tour and classes at the hospital.  All classes are free of charge if you are delivering at [facility].  Call [phone] to register for any of these classes:  Baby Love (learn about the delivery process and caring for a )  Big Brother / Big Sister Class (to help siblings prepare for  baby)  Lamaze (a 4 week class to learn about natural interventions for labor)  Breastfeeding (get a head start learning about breastfeeding)  Work on some methods for coping with the pains of labor.  A good bit of labor happens before you can get an epidural, and you'll feel more confident if you have a plan that you've practiced.  We encourage everyone to breastfeed if they can (and most women can!).  Please ask us questions if you have them.  Decide what you'd like to use for contraception (birth control) after this baby is born.  If you're having a boy, let us know if you plan to have him circumcised.    Pregnancy Milestones  After week 16, avoid lying on your back for more than a few minutes. Instead, lie on your side and switch sides often.  Between 19 and 22 weeks you'll have an ultrasound to look at the baby's anatomy and determine the gender (if you want to know and don't already know). This is around the same time when you should start feeling baby's movements and kicks.  Your gestational diabetes test will be done around 28 weeks.  We'll give you a TDAP booster shot so that your baby is protected from Whooping Cough (pertussis) his/her first few months of life.    When You Travel   The second trimester is a good time for travel. Talk to your health care provider about any special plans you may need to make. Always:   Wear a seat belt. Fasten the lap part under your belly. Wear the shoulder part also.   Take frequent breaks during long trips by car or plane. Move around to stretch your legs.   Drink plenty of fluids on flights. The air in plane cabins is very dry.   Avoid hot climates or high altitudes if you are not used to them.   Avoid places where the food and water might make you sick.    Intimacy  Unless your health care provider tells you otherwise, it's perfectly fine to continue having sex. In fact, many women find sex in the second trimester quite enjoyable due to increased blood supply to the  pelvic area.    Baby!  Organs continue to develop and begin to function, there's formation of eyebrows / eyelashes / fingernails, skin is wrinkled and covered in vernix, genitals develop, a fine hair called lanugo covers the body, and baby is busy: kicking, sleeping, swallowing amniotic fluid, listening to you, passing urine, and sucking those thumbs.    OTHER INFORMATION:  If your provider orders labs or other studies, you probably won't hear from us unless something is abnormal.  How we define normal is different for many things in pregnancy.  This includes several of the numbers on a Complete Blood Count (CBC).  If your result is flagged as abnormal in [employer] but you don't hear from us, it's probably because things are actually normal based on where you are in your pregnancy.

## 2024-03-25 ENCOUNTER — PROCEDURE VISIT (OUTPATIENT)
Dept: MATERNAL FETAL MEDICINE | Facility: CLINIC | Age: 24
End: 2024-03-25
Payer: MEDICAID

## 2024-03-25 ENCOUNTER — OFFICE VISIT (OUTPATIENT)
Dept: MATERNAL FETAL MEDICINE | Facility: CLINIC | Age: 24
End: 2024-03-25
Payer: MEDICAID

## 2024-03-25 VITALS
BODY MASS INDEX: 30.41 KG/M2 | SYSTOLIC BLOOD PRESSURE: 127 MMHG | WEIGHT: 160.94 LBS | DIASTOLIC BLOOD PRESSURE: 79 MMHG | HEART RATE: 101 BPM

## 2024-03-25 DIAGNOSIS — O26.872 SHORT CERVIX DURING PREGNANCY IN SECOND TRIMESTER: ICD-10-CM

## 2024-03-25 DIAGNOSIS — Z36.89 ENCOUNTER FOR ULTRASOUND TO ASSESS FETAL GROWTH: ICD-10-CM

## 2024-03-25 DIAGNOSIS — O99.119 COAGULATION DEFECT AFFECTING PREGNANCY, ANTEPARTUM: Primary | ICD-10-CM

## 2024-03-25 DIAGNOSIS — D68.9 COAGULATION DEFECT AFFECTING PREGNANCY, ANTEPARTUM: Primary | ICD-10-CM

## 2024-03-25 DIAGNOSIS — O99.112 OTHER DISEASES OF THE BLOOD AND BLOOD-FORMING ORGANS AND CERTAIN DISORDERS INVOLVING THE IMMUNE MECHANISM COMPLICATING PREGNANCY, SECOND TRIMESTER: Primary | ICD-10-CM

## 2024-03-25 DIAGNOSIS — O99.119 OTHER DISEASES OF THE BLOOD AND BLOOD-FORMING ORGANS AND CERTAIN DISORDERS INVOLVING THE IMMUNE MECHANISM COMPLICATING PREGNANCY, UNSPECIFIED TRIMESTER: ICD-10-CM

## 2024-03-25 PROCEDURE — 76805 OB US >/= 14 WKS SNGL FETUS: CPT | Mod: PBBFAC,PN | Performed by: STUDENT IN AN ORGANIZED HEALTH CARE EDUCATION/TRAINING PROGRAM

## 2024-03-25 PROCEDURE — 3074F SYST BP LT 130 MM HG: CPT | Mod: CPTII,,, | Performed by: STUDENT IN AN ORGANIZED HEALTH CARE EDUCATION/TRAINING PROGRAM

## 2024-03-25 PROCEDURE — 1159F MED LIST DOCD IN RCRD: CPT | Mod: CPTII,,, | Performed by: STUDENT IN AN ORGANIZED HEALTH CARE EDUCATION/TRAINING PROGRAM

## 2024-03-25 PROCEDURE — 99999 PR PBB SHADOW E&M-EST. PATIENT-LVL II: CPT | Mod: PBBFAC,,, | Performed by: STUDENT IN AN ORGANIZED HEALTH CARE EDUCATION/TRAINING PROGRAM

## 2024-03-25 PROCEDURE — 3078F DIAST BP <80 MM HG: CPT | Mod: CPTII,,, | Performed by: STUDENT IN AN ORGANIZED HEALTH CARE EDUCATION/TRAINING PROGRAM

## 2024-03-25 PROCEDURE — 76815 OB US LIMITED FETUS(S): CPT | Mod: 26,S$PBB,, | Performed by: STUDENT IN AN ORGANIZED HEALTH CARE EDUCATION/TRAINING PROGRAM

## 2024-03-25 PROCEDURE — 99212 OFFICE O/P EST SF 10 MIN: CPT | Mod: PBBFAC,TH,PN | Performed by: STUDENT IN AN ORGANIZED HEALTH CARE EDUCATION/TRAINING PROGRAM

## 2024-03-25 PROCEDURE — 76817 TRANSVAGINAL US OBSTETRIC: CPT | Mod: 26,S$PBB,, | Performed by: STUDENT IN AN ORGANIZED HEALTH CARE EDUCATION/TRAINING PROGRAM

## 2024-03-25 PROCEDURE — 99215 OFFICE O/P EST HI 40 MIN: CPT | Mod: S$PBB,TH,, | Performed by: STUDENT IN AN ORGANIZED HEALTH CARE EDUCATION/TRAINING PROGRAM

## 2024-03-25 PROCEDURE — 3008F BODY MASS INDEX DOCD: CPT | Mod: CPTII,,, | Performed by: STUDENT IN AN ORGANIZED HEALTH CARE EDUCATION/TRAINING PROGRAM

## 2024-03-25 NOTE — ASSESSMENT & PLAN NOTE
Rosa presents for a follow up cervical length after initiating vaginal progesterone in the setting of a short cervix. Cervix today is 1.0 cm and about 0.5 cm dilated. She is also currently undergoing work up for platelet type von willebrand disease.     Today I extensively counseled the patient, and her partner on our ultrasound and physical exam findings.    We discussed expectant management vs cerclage placement.  Patient was counseled on r/b/i/a of cerclage which include but are not limited to risk of anesthesia, pain, bleeding, infection, injury to adjacent structures, rupture of membranes, potential inability to place, and potential failure to prevent  delivery.    I counseled her that if she were to desire a cerclage, we would have a multidisciplinary discussion with Hematology and Anesthesia given her pending vWD subtype diagnosis. This may result in general anesthesia at the time of cerclage placement as well as administration of platelet infusion.    I counseled her that a cerclage would not be placed if there was concern for labor or infection.   We would also forgo placement it was felt that a cerclage could not be safely placed.      We discussed that with expectant management, there is a strong likelihood of early  delivery as well.    Additionally, discussed that a cerclage cannot be offered at 23w0d and beyond. We reviewed the threshold of viability. We reviewed the common  complications with extremely premature infants.     She has opted to forego cerclage placement and would like to expectantly manage. I will inform her primary OB as well as follow up with her in one week. Given her likelihood of  delivery, will work with Hematology and develop a plan of care if she does deliver prior to establishing a complete diagnosis.    Current plan:   corticosteroids   Follow up with Hematology and primary OB  Location of delivery- TBD

## 2024-03-25 NOTE — PROGRESS NOTES
Maternal Fetal Medicine Follow up  SUBJECTIVE:     Rosa Alvarez is a 23 y.o.  female with IUP at 22w5d who is seen for [facility] follow up for management of:    Problem   Coagulation Defect Affecting Pregnancy, Antepartum   Short Cervix During Pregnancy in Second Trimester     Previous notes reviewed.   No changes to medical, surgical, family, social, or obstetric history.      Current Outpatient Medications   Medication Instructions    prenatal vit/iron fum/folic ac (PRENATAL 1+1 ORAL) Oral    progesterone (PROMETRIUM) 200 mg, Vaginal, Nightly     Review of patient's allergies indicates:  No Known Allergies  Care team members:  MD [facility] - Primary OB  OBJECTIVE:   Blood Pressure: /79   Pulse 101   Wt 73 kg (160 lb 15 oz)   LMP 10/12/2023   BMI 30.41 kg/m²   Ultrasound performed. See viewpoint for full ultrasound report.  IUP at 22w5d   Fetus in breech presentation  Cervical length short measuring 1 cm. No evidence of funneling     SSE: 0.5 cm/thick/posterior    Labs from Dr. Young's assessment:  ApTT/INR/PT/Fibrinogen- normal  FVIII activity 95%  vwF antigen 95%  Ristocetin cofactor 33%( normal )  Fact XIII functional assay 71%  vWF collagen binding assay ratio( CBA/VW Ag) incomplete  Factor VIII antigen incomplete  vWD gene sequencing incomplete  ASSESSMENT/PLAN:     23 y.o.  female with IUP at 22w5d presents for [facility] follow up.    Coagulation defect affecting pregnancy, antepartum  Currently being worked up by Hematology. Per patient, Dr. Young is testing her for platelet dominant vWD. Will follow up with his clinic once her labs have resulted.    Short cervix during pregnancy in second trimester  Rosa presents for a follow up cervical length after initiating vaginal progesterone in the setting of a short cervix. Cervix today is 1.0 cm and about 0.5 cm dilated. She is also currently undergoing work up for platelet type von willebrand disease.     Today I extensively counseled the patient, and  her partner on our ultrasound and physical exam findings.    We discussed expectant management vs cerclage placement.  Patient was counseled on r/b/i/a of cerclage which include but are not limited to risk of anesthesia, pain, bleeding, infection, injury to adjacent structures, rupture of membranes, potential inability to place, and potential failure to prevent  delivery.    I counseled her that if she were to desire a cerclage, we would have a multidisciplinary discussion with Hematology and Anesthesia given her pending vWD subtype diagnosis. This may result in general anesthesia at the time of cerclage placement as well as administration of platelet infusion.    I counseled her that a cerclage would not be placed if there was concern for labor or infection.   We would also forgo placement it was felt that a cerclage could not be safely placed.      We discussed that with expectant management, there is a strong likelihood of early  delivery as well.    Additionally, discussed that a cerclage cannot be offered at 23w0d and beyond. We reviewed the threshold of viability. We reviewed the common  complications with extremely premature infants.     She has opted to forego cerclage placement and would like to expectantly manage. I will inform her primary OB as well as follow up with her in one week. Given her likelihood of  delivery, will work with Hematology and develop a plan of care if she does deliver prior to establishing a complete diagnosis.    Current plan:  Follow up with Hematology and primary OB. Will notify them both  Location of delivery- TBD. If presents in  labor, [facility].  Discuss at Maternal Conference on Wednesday        FOLLOW UP:     F/u in 1 weeks for US/MFM visit    Jodi Yates  Maternal-Fetal Medicine    Electronically Signed by oJdi Yates 2024

## 2024-03-26 ENCOUNTER — PATIENT MESSAGE (OUTPATIENT)
Dept: OBSTETRICS AND GYNECOLOGY | Facility: CLINIC | Age: 24
End: 2024-03-26
Payer: MEDICAID

## 2024-03-27 ENCOUNTER — DOCUMENTATION ONLY (OUTPATIENT)
Dept: MATERNAL FETAL MEDICINE | Facility: CLINIC | Age: 24
End: 2024-03-27
Payer: MEDICAID

## 2024-03-27 NOTE — PROGRESS NOTES
Plan of care discussed at [facility] maternal conference    Diagnoses   Coagulation Defect - platelet dominant vWD   Labs   2/20/24: PTT/INR/PT/Fibrinogen- normal   FVIII activity 95%   vwF antigen 95%   Ristocetin cofactor 33%( normal )   Fact XIII functional assay 71%   vWF collagen binding assay ratio( CBA/VW Ag) incomplete   Factor VIII antigen incomplete             vWD gene sequencing incomplete   PBS: no specific morphologic abnormalities   Short cervix   10 mm with 0.5 cm dilation on 3/25, elected to continue vag P      Care Team   [facility]   Hematology/[facility]      Follow up:   Plan regarding classification of vWD and appropriate treatment (DDAVP vs Humate P)   Dr. Yates reaching out to Hematology to further complete workup and treatment plan    Jenise Butts MD  PGY 6  Maternal Fetal Medicine  [facility]

## 2024-04-02 ENCOUNTER — OFFICE VISIT (OUTPATIENT)
Dept: MATERNAL FETAL MEDICINE | Facility: CLINIC | Age: 24
End: 2024-04-02
Payer: MEDICAID

## 2024-04-02 ENCOUNTER — PROCEDURE VISIT (OUTPATIENT)
Dept: MATERNAL FETAL MEDICINE | Facility: CLINIC | Age: 24
End: 2024-04-02
Payer: MEDICAID

## 2024-04-02 VITALS
HEART RATE: 107 BPM | WEIGHT: 163.13 LBS | SYSTOLIC BLOOD PRESSURE: 135 MMHG | DIASTOLIC BLOOD PRESSURE: 78 MMHG | BODY MASS INDEX: 30.83 KG/M2

## 2024-04-02 DIAGNOSIS — O99.112 OTHER DISEASES OF THE BLOOD AND BLOOD-FORMING ORGANS AND CERTAIN DISORDERS INVOLVING THE IMMUNE MECHANISM COMPLICATING PREGNANCY, SECOND TRIMESTER: Primary | ICD-10-CM

## 2024-04-02 DIAGNOSIS — O99.119 COAGULATION DEFECT AFFECTING PREGNANCY, ANTEPARTUM: Primary | ICD-10-CM

## 2024-04-02 DIAGNOSIS — D68.9 COAGULATION DEFECT AFFECTING PREGNANCY, ANTEPARTUM: Primary | ICD-10-CM

## 2024-04-02 DIAGNOSIS — O26.872 SHORT CERVIX DURING PREGNANCY IN SECOND TRIMESTER: ICD-10-CM

## 2024-04-02 PROCEDURE — 99215 OFFICE O/P EST HI 40 MIN: CPT | Mod: S$PBB,TH,, | Performed by: STUDENT IN AN ORGANIZED HEALTH CARE EDUCATION/TRAINING PROGRAM

## 2024-04-02 PROCEDURE — 3075F SYST BP GE 130 - 139MM HG: CPT | Mod: CPTII,,, | Performed by: STUDENT IN AN ORGANIZED HEALTH CARE EDUCATION/TRAINING PROGRAM

## 2024-04-02 PROCEDURE — 99999 PR PBB SHADOW E&M-EST. PATIENT-LVL II: CPT | Mod: PBBFAC,,, | Performed by: STUDENT IN AN ORGANIZED HEALTH CARE EDUCATION/TRAINING PROGRAM

## 2024-04-02 PROCEDURE — 3008F BODY MASS INDEX DOCD: CPT | Mod: CPTII,,, | Performed by: STUDENT IN AN ORGANIZED HEALTH CARE EDUCATION/TRAINING PROGRAM

## 2024-04-02 PROCEDURE — 99212 OFFICE O/P EST SF 10 MIN: CPT | Mod: PBBFAC,TH,PN,25 | Performed by: STUDENT IN AN ORGANIZED HEALTH CARE EDUCATION/TRAINING PROGRAM

## 2024-04-02 PROCEDURE — 76815 OB US LIMITED FETUS(S): CPT | Mod: PBBFAC,PN | Performed by: STUDENT IN AN ORGANIZED HEALTH CARE EDUCATION/TRAINING PROGRAM

## 2024-04-02 PROCEDURE — 1159F MED LIST DOCD IN RCRD: CPT | Mod: CPTII,,, | Performed by: STUDENT IN AN ORGANIZED HEALTH CARE EDUCATION/TRAINING PROGRAM

## 2024-04-02 PROCEDURE — 3078F DIAST BP <80 MM HG: CPT | Mod: CPTII,,, | Performed by: STUDENT IN AN ORGANIZED HEALTH CARE EDUCATION/TRAINING PROGRAM

## 2024-04-02 NOTE — ASSESSMENT & PLAN NOTE
Patient discussed with at complex maternal conference as well as with Dr. Young( her hematologist) and Dr. Magaña( [facility]ematology)    Due to her short cervix, she is at high risk of  delivery. Due to presumed platelet type vWD( testing pending), decision made to deliver at [facility]. I have informed her to go to nearest hospital if any symptoms of bleeding/ labor and inform for the need to transfer to [facility] if stable.    Dr. Magaña at [facility] is aware and will notify the [facility] on call Hematology physicians( Dr. Fry and Dr. Soto) as well as blood bank.  Recommendations  Follow up with Dr. Young tomorrow for official recommendations. Will have his note scanned into media.   Current plan is for Humate P 60 units/kg loading dase and to maintain at 40 units/ kg every 8 hours for 24-48 hours.   Due to risk of delayed postpartum hemorrhage, okay to discharge home on TXA 1300 TID for 10-14 days  If admitted to [facility], please consult Hematology and notify blood bank physician on call  If admitted elsewhere, please consult [facility]

## 2024-04-02 NOTE — PROGRESS NOTES
Maternal Fetal Medicine Follow up  SUBJECTIVE:     Rosa Alvarez is a 23 y.o.  female with IUP at 23w6d who is seen for MF follow up for management of:    Problem   Coagulation Defect Affecting Pregnancy, Antepartum   Short Cervix During Pregnancy in Second Trimester     Previous notes reviewed.   No changes to medical, surgical, family, social, or obstetric history.    Interval history since last MFM visit: denies bleeding, cramping, contractions    Current Outpatient Medications   Medication Instructions    prenatal vit/iron fum/folic ac (PRENATAL 1+1 ORAL) Oral    progesterone (PROMETRIUM) 200 mg, Vaginal, Nightly     Review of patient's allergies indicates:  No Known Allergies  Care team members:  We[facility] - Primary OB  OBJECTIVE:   Blood Pressure: /78   Pulse 107   Wt 74 kg (163 lb 2.3 oz)   LMP 10/12/2023   BMI 30.83 kg/m²   Ultrasound performed. See viewpoint for full ultrasound report.  IUP at 23 weeks 6/7 days  Known short cervix  Fetus in breech presentation  ASSESSMENT/PLAN:     23 y.o.  female with IUP at 23w6d presents for MF follow up.    Coagulation defect affecting pregnancy, antepartum  Patient discussed with at complex maternal conference as well as with Dr. Young( her hematologist) and Dr. Magaña( [facility]ology)    Due to her short cervix, she is at high risk of  delivery. Due to presumed platelet type vWD( testing pending), decision made to deliver at [facility]. I have informed her to go to nearest hospital if any symptoms of bleeding/ labor and inform for the need to transfer to [facility] if stable.    Dr. Magaña at [facility] is aware and will notify the [facility] on call Hematology physicians( Dr. Fry and Dr. Soto) as well as blood bank.  Recommendations  Follow up with Dr. Young tomorrow for official recommendations. Will have his note scanned into media.   Current plan is for Humate P 60 units/kg loading dase and to maintain at 40 units/ kg every 8 hours for  24-48 hours.   Due to risk of delayed postpartum hemorrhage, okay to discharge home on TXA 1300 TID for 10-14 days  If admitted to [facility], please consult Hematology and notify blood bank physician on call  If admitted elsewhere, please consult M    Short cervix during pregnancy in second trimester  Previously counseled. Declined cerclage  If  concerns for contractions,  labor, recommend course of  corticosteroids        Jodi Yates  Maternal-Fetal Medicine    Electronically Signed by Jodi Yates 2024

## 2024-04-02 NOTE — ASSESSMENT & PLAN NOTE
Previously counseled. Declined cerclage  If  concerns for contractions,  labor, recommend course of  corticosteroids

## 2024-04-03 ENCOUNTER — PATIENT MESSAGE (OUTPATIENT)
Dept: OTHER | Facility: OTHER | Age: 24
End: 2024-04-03
Payer: MEDICAID

## 2024-04-03 ENCOUNTER — TELEPHONE (OUTPATIENT)
Dept: MATERNAL FETAL MEDICINE | Facility: CLINIC | Age: 24
End: 2024-04-03
Payer: MEDICAID

## 2024-04-08 ENCOUNTER — LAB VISIT (OUTPATIENT)
Dept: LAB | Facility: HOSPITAL | Age: 24
End: 2024-04-08
Attending: STUDENT IN AN ORGANIZED HEALTH CARE EDUCATION/TRAINING PROGRAM
Payer: MEDICAID

## 2024-04-08 ENCOUNTER — PROCEDURE VISIT (OUTPATIENT)
Dept: MATERNAL FETAL MEDICINE | Facility: CLINIC | Age: 24
End: 2024-04-08
Payer: MEDICAID

## 2024-04-08 ENCOUNTER — OFFICE VISIT (OUTPATIENT)
Dept: MATERNAL FETAL MEDICINE | Facility: CLINIC | Age: 24
End: 2024-04-08
Payer: MEDICAID

## 2024-04-08 VITALS
WEIGHT: 165.38 LBS | HEART RATE: 97 BPM | DIASTOLIC BLOOD PRESSURE: 77 MMHG | SYSTOLIC BLOOD PRESSURE: 127 MMHG | BODY MASS INDEX: 31.24 KG/M2

## 2024-04-08 DIAGNOSIS — O99.112 OTHER DISEASES OF THE BLOOD AND BLOOD-FORMING ORGANS AND CERTAIN DISORDERS INVOLVING THE IMMUNE MECHANISM COMPLICATING PREGNANCY, SECOND TRIMESTER: ICD-10-CM

## 2024-04-08 DIAGNOSIS — D68.9 COAGULATION DEFECT AFFECTING PREGNANCY, ANTEPARTUM: Primary | ICD-10-CM

## 2024-04-08 DIAGNOSIS — O99.119 COAGULATION DEFECT AFFECTING PREGNANCY, ANTEPARTUM: Primary | ICD-10-CM

## 2024-04-08 PROCEDURE — 76817 TRANSVAGINAL US OBSTETRIC: CPT | Mod: 26,S$PBB,, | Performed by: STUDENT IN AN ORGANIZED HEALTH CARE EDUCATION/TRAINING PROGRAM

## 2024-04-08 PROCEDURE — 99214 OFFICE O/P EST MOD 30 MIN: CPT | Mod: S$PBB,TH,, | Performed by: STUDENT IN AN ORGANIZED HEALTH CARE EDUCATION/TRAINING PROGRAM

## 2024-04-08 PROCEDURE — 85247 CLOT FACTOR VIII MULTIMETRIC: CPT | Performed by: STUDENT IN AN ORGANIZED HEALTH CARE EDUCATION/TRAINING PROGRAM

## 2024-04-08 PROCEDURE — 85397 CLOTTING FUNCT ACTIVITY: CPT | Performed by: STUDENT IN AN ORGANIZED HEALTH CARE EDUCATION/TRAINING PROGRAM

## 2024-04-08 PROCEDURE — 85246 CLOT FACTOR VIII VW ANTIGEN: CPT | Performed by: STUDENT IN AN ORGANIZED HEALTH CARE EDUCATION/TRAINING PROGRAM

## 2024-04-08 PROCEDURE — 76816 OB US FOLLOW-UP PER FETUS: CPT | Mod: PBBFAC,PN | Performed by: STUDENT IN AN ORGANIZED HEALTH CARE EDUCATION/TRAINING PROGRAM

## 2024-04-08 PROCEDURE — 36415 COLL VENOUS BLD VENIPUNCTURE: CPT | Mod: PO | Performed by: STUDENT IN AN ORGANIZED HEALTH CARE EDUCATION/TRAINING PROGRAM

## 2024-04-08 NOTE — PROGRESS NOTES
Maternal Fetal Medicine Follow up  SUBJECTIVE:     Rosa Alvarez is a 23 y.o.  female with IUP at 24w5d who is seen for MF follow up for management of:    Problem   Coagulation Defect Affecting Pregnancy, Antepartum     Previous notes reviewed.   No changes to medical, surgical, family, social, or obstetric history.    Interval history since last MFM visit: denies any vaginal bleeding, cramping, contractions, discharge. + FM    Current Outpatient Medications   Medication Instructions    prenatal vit/iron fum/folic ac (PRENATAL 1+1 ORAL) Oral    progesterone (PROMETRIUM) 200 mg, Vaginal, Nightly     Review of patient's allergies indicates:  No Known Allergies  Care team members:  MD [facility] - Primary OB  OBJECTIVE:   Blood Pressure: 127/77 mm Hg  Ultrasound performed. See viewpoint for full ultrasound report.  IUP at 24 weeks 5/7 days  Fetal size is AGA with the EFW at the 35% and the AC at the 38%. The EFW is 687 g.  A limited repeat fetal anatomic survey shows no abnormalities of the structures that were adequately imaged.  AFV is normal.   Known short cervix now measuring approximately 0.5 cm  ASSESSMENT/PLAN:     23 y.o.  female with IUP at 24w5d presents for MFM follow up.    Coagulation defect affecting pregnancy, antepartum  Patient discussed with at complex maternal conference as well as with Dr. Young( her hematologist) and Dr. Magaña( [facility]).  Please see Dr. Young's note from 4/3.     Due to her short cervix, she is at high risk of  delivery. She is asymptomatic however cervix is 0.5 cm in length today. Minimal dilation( approximately 0.5 cm on my exam today).  Due to presumed platelet type vWD( testing pending however likely 2a), decision made to deliver at [facility]. I have informed her to go to nearest hospital if any symptoms of bleeding/ labor and inform for the need to transfer to [facility] if stable.    Dr. Magaña at [facility] is aware and will notify the [facility] on call  Hematology physicians( Dr. Fry and Dr. Soto) as well as blood bank. [facility]ology has asked for repeat labs. Patient will have them drawn today.    Discussed  corticosteroids as a group.Will defer to if delivery is expected within 7 days.    Recommendations from Dr. Young( please refer to note for full recs)  Humate P dosing  -60 VWF:RCo units/kg loading dose followed by 40 units/kg every 8-24 hours maintenance.   -Check Levels of Factor 8, VWF, VWF activity daily post-Partum  -Need hematology consult when is hospitalized  Tranexemic Acid  --If heavy bleeding post-partum, can use Tranexemic acid  IV: 10 mg/kg (maximum 600 mg/dose) every 8 hours for up to 5 days or   PO: 650 mg tablets: 1.3 g 3 times daily for up to 5 days    Avoid DDAVP    Recommendations from M  Labs today  [facility] Heme aware. Patient's primary OB notified.   Anesthesia consult placed at [facility]. Can be completed virtually  Fetus is currently breech- will be for  delivery if delivers in the near future.  If admitted to [facility], please consult Hematology and notify blood bank physician on call  If admitted elsewhere, please consult MFM      F/u in 2 weeks for US/M visit    Jodi Yates  Maternal-Fetal Medicine    Electronically Signed by Jodi Yates 2024

## 2024-04-08 NOTE — ASSESSMENT & PLAN NOTE
Patient discussed with at complex maternal conference as well as with Dr. Young( her hematologist) and Dr. Magaña( [facility]ematology).  Please see Dr. Young's note from 4/3.     Due to her short cervix, she is at high risk of  delivery. She is asymptomatic however cervix is 0.5 cm in length today. Minimal dilation( approximately 0.5 cm on my exam today).  Due to presumed platelet type vWD( testing pending however likely 2a), decision made to deliver at [facility]. I have informed her to go to [facility] if any symptoms of bleeding/ labor and inform for the need to transfer to [facility] if stable.    Dr. Magaña at [facility] is aware and will notify the [facility] on call Hematology physicians( Dr. Fry and Dr. Soto) as well as blood bank. [facility]ematology has asked for repeat labs. Patient will have them drawn today.    Discussed  corticosteroids as a group.Will defer to if delivery is expected within 7 days.    Recommendations from Dr. Young( please refer to note for full recs)  Humate P dosing  -60 VWF:RCo units/kg loading dose followed by 40 units/kg every 8-24 hours maintenance.   -Check Levels of Factor 8, VWF, VWF activity daily post-Partum  -Need hematology consult when is hospitalized  Tranexemic Acid  --If heavy bleeding post-partum, can use Tranexemic acid  IV: 10 mg/kg (maximum 600 mg/dose) every 8 hours for up to 5 days or   PO: 650 mg tablets: 1.3 g 3 times daily for up to 5 days    Avoid DDAVP    Recommendations from [facility]  Labs today  [facility] Heme aware. Patient's primary OB notified.   Anesthesia consult placed at [facility]. Can be completed virtually  Fetus is currently breech- will be for  delivery if delivers in the near future.  If admitted to [facility], please consult Hematology and notify blood bank physician on call  If admitted elsewhere, please consult [facility]

## 2024-04-08 NOTE — PROGRESS NOTES
"Pt states she will have heme labs drawn "on her way out" today at [address] [facility] [address]. No c/o vaginal bleeding, leaking fluid. States + fetal movement.    "

## 2024-04-09 ENCOUNTER — ROUTINE PRENATAL (OUTPATIENT)
Dept: OBSTETRICS AND GYNECOLOGY | Facility: CLINIC | Age: 24
End: 2024-04-09
Payer: MEDICAID

## 2024-04-09 VITALS
HEART RATE: 109 BPM | BODY MASS INDEX: 31.37 KG/M2 | DIASTOLIC BLOOD PRESSURE: 72 MMHG | SYSTOLIC BLOOD PRESSURE: 128 MMHG | WEIGHT: 166 LBS

## 2024-04-09 DIAGNOSIS — O09.90 SUPERVISION OF HIGH RISK PREGNANCY, ANTEPARTUM: Primary | ICD-10-CM

## 2024-04-09 PROCEDURE — 99212 OFFICE O/P EST SF 10 MIN: CPT | Mod: PBBFAC,TH,PO | Performed by: GENERAL PRACTICE

## 2024-04-09 PROCEDURE — 99214 OFFICE O/P EST MOD 30 MIN: CPT | Mod: TH,S$PBB,, | Performed by: GENERAL PRACTICE

## 2024-04-09 PROCEDURE — 99999 PR PBB SHADOW E&M-EST. PATIENT-LVL II: CPT | Mod: PBBFAC,,, | Performed by: GENERAL PRACTICE

## 2024-04-09 NOTE — PROGRESS NOTES
"  TODAY'S PROGRESS NOTE    2024    INDIANA Lee is a 23 y.o.  at 24w6d who presents for JENNIFER.  She denies VB, LOF, CTX's.  She reports normal FM.  Says she carries a chart with her with Dr. Young's diagnosis and recommendations.    O/  VITALS:  Vitals:    24 0953   BP: 128/72   Pulse: 109     FH: 26cm  DT'S: 145bpm by doppler    GROWTH US (24 @ 24+5wks):  EFW 687g = 35th percentile, breech presentation, (1lb 8oz)  Cervical length = 4.5mm    A/P  24w6d for JENNIFER, doing well.    MFM ultrasound and f/u in 2wks: 15 APR 2024  F/U with Dr. Young (e-visit)   F/U with me @ 28wks (labs ordered today, to be done just prior to her next appointment).  Informed consent obtained for delivery.  See below for full discussion.  Postpartum contraceptive options were reviewed with the patient.  At this time she is considering Nexplanon or IUD.  PTL precautions reviewed  RTC ~28wks EGA for JENNIFER, sooner rogelio KELLY MD   OB PROBLEM LIST:    Deliver at [facility]    Von Willebrand Disease, likely type 2a, follows with Dr. Juan Young    [  ] recommendations below    [  ] Anesthesia consult (plan for virtual @ [facility])    [  ] notify blood bank and consult Hematology @ admission    Defer  steroids till delivery expected within 7d    Short cervix (no prior PTD)    [  ] vaginal prog 200mg QHS    [  ] serial CVX length    [  ] Breech fetus @ 24wks -->     Chlamydia @ NOB, DOUGLAS neg    [  ] T3 STD testing    Transfer from [facility]   FINAL EDC:    2024 by US done 2023 @ 6-1/7 week    Baby: Boy "Thelma"    SO Name: Pedrito Morfin ANATOMY SCAN:    2024 @ 17+5wks: anatomy normal but suboptimal views of profile and face.    CVX 2.1 cm --> 1wk f/u  Anterior placenta no previa    24 @ 18+6wks: CVX 2.6cm --> 2wks f/u    3/11/24 @ 20+5wks: s=d, anatomy survey completed and wnl, CVX 2.0cm --> 2wks f/u      INITIAL LABS:  DATE: 11/15/2023  MBT: ANDREW positive  AB SCREEN: negative  RPR: " negative  RUBELLA: Immune  HEPATITIS A/B/C: negative  HIV: negative  CBC: 2/20/2024 - 10.5 >--- 14.2 / 39.6 ---< 399  HGB: Sickle cell negative  URINE CX: neg (3/6/24)  UDS: Negative  Other:   TSH: 1.050   10-12 WEEK LABS:    PAP: ASCUS / HPV Negative / Date: 11/20/2023    VALDO/CHLAM: chlam + / valdo - @ NOB  --> neg x 2 (21 MAR 2024)    cfDNA (Gvumzodi45): to lab today    CARRIER SCREEN (Inheritest Core): to lab today   28 WEEK LABS:    CBC:   1Hr OGTT:  Ferritin:      OTHER LABS:  GBS: ___    11/30/2023:  FVIII 74% and VWF Ag 81% - both wnl  Vwf ACTIVITY( vWF: RCO) 43% (normal range )     1/10/24:  FVIII and vWF Ag 105% and 101% - both wnl  vWF activity: 35% - low    OTHER ULTRASOUNDS:     TO-DO THROUGHOUT PREGNANCY:    Depression Screen: 3/6/24    TDAP (27-36wks): ___    Birth Plan: ___    Plans to breastfeed: yes    Plans for epidural: ? With vWF ?    Classes at hospital: offered 3/21/24    Postpartum contraception: Nexplanon vs IUD    Consent for delivery: 4/9/24   MEDICATIONS:    Prenatal vitamins  Vaginal progesterone ALLERGIES:    NKDA    MEDICAL HISTORY:    Von Willebrand's disease     Pre-pregnancy BMI = 25.5 SURGICAL HISTORY:    Negative    SOCIAL HISTORY:    Smoker: non-smoker  Alcohol: denies  Drugs: denies  Relationship: single  Domestic Violence: no  Lives with: Mother & Fathere  Education Level: Some College  Occupation: homemaker  Taoist: No preference  Other:   GYN HISTORY:    PAP'S: ASCUS with Neg HPV  STI'S: recent diagnosis: chlamydia  GENITAL HSV: no FAMILY HISTORY:    HTN: No  DIABETES: No  BLEEDING D/O: No  CLOTTING D/O: No  BIRTH DEFECTS: No  MENTAL DISABILITY: No  TWINS OR MORE: No  GYN CANCER: No  Other:     OBSTETRIC HISTORY   Month/Year Mode of Delivery EGA Wt. M/F Complications / Comments   G1          VW DZ:  I briefly discussed the recommendations for von Willebrand's disease in pregnancy. This requires Hematology involvement to help discern the type of vWD that is present. The  type is important in predicting clinical risk, counseling regarding inheritance risk, and management options to reduce bleeding complications. Von Willebrand factor (vWF) typically increases in pregnancy and complications in the antepartum period are rare.  Shortly after delivery, vWF falls quickly and can lead to immediate or delayed postpartum hemorrhage.  A high risk time for bleeding is approximately 1-2 weeks postpartum when FVIII and ristocetin co-factor levels drop to prepregnancy levels.     Von Willebrand Disease:   Most likely 2A     --VwF levels are normal right now but could be false elevated from Pregnancy. Could drop rapidly post-Pregnancy     --WVF Activity: VWF antigen ratio is 0.3.   <0.7 ratio is diagnostic of 2A, 2B, 2M. But VWF could have increased during pregnancy (can increase 2-5 folds). So can not rule out Type 1. The only testing I have available is from pregnancy.      --HMW multimer pending and it can differentiate 2A from 1     --Most likely 2A as platelets are normal (Should be low in 2B) but need RIPA to confirm  --Do not have RIPA testing ready so can not differentiate 2A from 2B (Or platelet type VWD).      --DDVAP can be used in Type 1 (But not Type 1C) and Type 2A. Can not use in Type 1C or 2B.   DDAVP can also cause uterine contractions (Weak data).   Avoid DDAVP as she has not tried it before  After delivery, she should have DDAVP test to monitor changes in VWF 4 hours post for future use     --She should be admitted for planned vaginal delivery  --Should get Humate P loading dose and maintenance for 3-5 days post-partum to prevent post-partum hemorrhage. Can extend to 10-15 days depending on bleeding  --She will be admitted at [facility] and a hematologist should be consulted to help manage the VWF levels.      --VWF levels for vaginal delivery should be maintained >50 IU/dl     Humate P dosing  -60 VWF:RCo units/kg loading dose followed by 40 units/kg every 8-24  hours maintenance.   -Check Levels of Factor 8, VWF, VWF activity daily post-Partum  -Need hematology consult when is hospitalized     Tranexemic Acid  --If heavy bleeding post-partum, can use Tranexemic acid  IV: 10 mg/kg (maximum 600 mg/dose) every 8 hours for up to 5 days or   PO: 650 mg tablets: 1.3 g 3 times daily for up to 5 days    SHORT CVX:  We reviewed the decreased incidence of  birth in women with a short cervix without a history or  birth who were given nightly vaginal progesterone (prometrium 200mg). Patient was counseled to avoid heavy lifting and moderate to high impact exercise. Pelvic rest can be considered but does not affect  birth risk. Bedrest is not recommended due to risk of thromboembolism and no proven benefit with respect to  delivery.     CONSENT FOR DELIVERY:  Today we obtained informed consent for delivery.  We discussed what to expect in labor and delivery.  We addressed the fact that our role is to safely guide her and her baby through the birthing process although we are not in control of the process, and neither is she.  We will respond to what her baby and her body give us, as determined by FHR monitoring, tocometry, labor exams, and other clinical indicators.  She understands many emergencies can occur during labor and delivery, and our interventions aren't always adequate or timely enough to prevent complications.  In particular we discussed hemorrhage in detail.  She agrees to the use or administration of the following if indicated:  IV fluids, antibiotics, cervical ripening (mechanical and medicinal methods), oxytocin (Pitocin), amniotomy, IV or IM pain medications, epidural or spinal anesthesia (if considered safe with VWD), general anesthesia, internal monitors (FSE and IUPC), vacuum or forceps delivery, , episiotomy, medications and mechanical devices for treating hemorrhage, surgical interventions, blood transfusion, and other medicines  and interventions considered important for her or her baby's health.  The patient understands that labor, delivery, and the above listed interventions can pose many risks to her and to her baby, which include but are not limited to, viral/bacterial infection, allergic reaction, temporary or permanent bodily injury or disability, brain damage, nerve damage, DVT/PE, and death.  She had adequate opportunity to ask questions, and they were answered to her apparent satisfaction.

## 2024-04-09 NOTE — PATIENT INSTRUCTIONS
To schedule classes (see below for what's offered) at the hospital, call [phone].    Have a question or concern?    PRO TIP: Program these phone numbers in your cell phone!    for an emergency  call 911 or go to the nearest hospital Especially after 20 weeks of the pregnancy, please remember that  Labor & Delivery is at [facility].  There is no L&D at [facility] (formerly called [facility]).  After hours you can only access L&D through the Emergency Room (entrance on [facility]).   [facility] Nurse Care Advice Line  [phone] At any time during your pregnancy,  you can speak to a nurse 24-7.   for non-urgent issues, send us a  message in [employer] Consider calling the Nurse Care Advice Line if it's a weekend or  toward the end of the work-day since  [employer] and phone messages may not be answered for a day or two.   for non-urgent issues, call the clinic  [phone], Option 3    Labor & Delivery  [phone] Starting at 20 weeks of the pregnancy,  you can speak to a nurse on L&D 24-7.     SECOND TRIMESTER  14+0 - 28+6 weeks    Adapting to Pregnancy: Second Trimester   Keep up the healthy habits you started in your first trimester.  It's not too late to make better choices and drop bad habits - your baby's counting on you!  Most women enjoy this middle part of the pregnancy: first trimester fatigue and morning sickness are probably behind you, and your belly's not yet getting in the way physically.    Your To-Do List  There are several things you should start working on.  More information on these topics can be found in your OB Welcome Packet and the A-Z Book.    Choose a pediatrician for your baby.  Sign up for a tour and classes at the hospital.  All classes are free of charge if you are delivering at [facility].  Call [phone] to register for any of these classes:  Baby Love (learn about the delivery process and caring for a )  Big Brother / Big Sister Class (to help siblings prepare for  baby)  Lamaze (a 4 week class to learn about natural interventions for labor)  Breastfeeding (get a head start learning about breastfeeding)  Work on some methods for coping with the pains of labor.  A good bit of labor happens before you can get an epidural, and you'll feel more confident if you have a plan that you've practiced.  We encourage everyone to breastfeed if they can (and most women can!).  Please ask us questions if you have them.  Decide what you'd like to use for contraception (birth control) after this baby is born.  If you're having a boy, let us know if you plan to have him circumcised.    Pregnancy Milestones  After week 16, avoid lying on your back for more than a few minutes. Instead, lie on your side and switch sides often.  Between 19 and 22 weeks you'll have an ultrasound to look at the baby's anatomy and determine the gender (if you want to know and don't already know). This is around the same time when you should start feeling baby's movements and kicks.  Your gestational diabetes test will be done around 28 weeks.  We'll give you a TDAP booster shot so that your baby is protected from Whooping Cough (pertussis) his/her first few months of life.    When You Travel   The second trimester is a good time for travel. Talk to your health care provider about any special plans you may need to make. Always:   Wear a seat belt. Fasten the lap part under your belly. Wear the shoulder part also.   Take frequent breaks during long trips by car or plane. Move around to stretch your legs.   Drink plenty of fluids on flights. The air in plane cabins is very dry.   Avoid hot climates or high altitudes if you are not used to them.   Avoid places where the food and water might make you sick.    Intimacy  Unless your health care provider tells you otherwise, it's perfectly fine to continue having sex. In fact, many women find sex in the second trimester quite enjoyable due to increased blood supply to the  pelvic area.    Baby!  Organs continue to develop and begin to function, there's formation of eyebrows / eyelashes / fingernails, skin is wrinkled and covered in vernix, genitals develop, a fine hair called lanugo covers the body, and baby is busy: kicking, sleeping, swallowing amniotic fluid, listening to you, passing urine, and sucking those thumbs.    OTHER INFORMATION:  If your provider orders labs or other studies, you probably won't hear from us unless something is abnormal.  How we define normal is different for many things in pregnancy.  This includes several of the numbers on a Complete Blood Count (CBC).  If your result is flagged as abnormal in [place] but you don't hear from us, it's probably because things are actually normal based on where you are in your pregnancy.

## 2024-04-10 LAB
VWF AG ACT/NOR PPP IA: 127 % (ref 55–200)
VWF:AC ACT/NOR PPP IA: 84 % (ref 55–200)

## 2024-04-15 ENCOUNTER — OFFICE VISIT (OUTPATIENT)
Dept: MATERNAL FETAL MEDICINE | Facility: CLINIC | Age: 24
End: 2024-04-15
Payer: MEDICAID

## 2024-04-15 ENCOUNTER — PROCEDURE VISIT (OUTPATIENT)
Dept: MATERNAL FETAL MEDICINE | Facility: CLINIC | Age: 24
End: 2024-04-15
Payer: MEDICAID

## 2024-04-15 VITALS
BODY MASS INDEX: 31.66 KG/M2 | SYSTOLIC BLOOD PRESSURE: 131 MMHG | WEIGHT: 167.56 LBS | HEART RATE: 103 BPM | DIASTOLIC BLOOD PRESSURE: 82 MMHG

## 2024-04-15 DIAGNOSIS — O26.872 SHORT CERVIX DURING PREGNANCY IN SECOND TRIMESTER: Primary | ICD-10-CM

## 2024-04-15 DIAGNOSIS — O99.119 COAGULATION DEFECT AFFECTING PREGNANCY, ANTEPARTUM: ICD-10-CM

## 2024-04-15 DIAGNOSIS — D68.9 COAGULATION DEFECT AFFECTING PREGNANCY, ANTEPARTUM: ICD-10-CM

## 2024-04-15 DIAGNOSIS — O26.872 SHORT CERVIX DURING PREGNANCY IN SECOND TRIMESTER: ICD-10-CM

## 2024-04-15 PROCEDURE — 3079F DIAST BP 80-89 MM HG: CPT | Mod: CPTII,,, | Performed by: STUDENT IN AN ORGANIZED HEALTH CARE EDUCATION/TRAINING PROGRAM

## 2024-04-15 PROCEDURE — 99213 OFFICE O/P EST LOW 20 MIN: CPT | Mod: S$PBB,TH,, | Performed by: STUDENT IN AN ORGANIZED HEALTH CARE EDUCATION/TRAINING PROGRAM

## 2024-04-15 PROCEDURE — 1159F MED LIST DOCD IN RCRD: CPT | Mod: CPTII,,, | Performed by: STUDENT IN AN ORGANIZED HEALTH CARE EDUCATION/TRAINING PROGRAM

## 2024-04-15 PROCEDURE — 3008F BODY MASS INDEX DOCD: CPT | Mod: CPTII,,, | Performed by: STUDENT IN AN ORGANIZED HEALTH CARE EDUCATION/TRAINING PROGRAM

## 2024-04-15 PROCEDURE — 99212 OFFICE O/P EST SF 10 MIN: CPT | Mod: PBBFAC,TH,PN,25 | Performed by: STUDENT IN AN ORGANIZED HEALTH CARE EDUCATION/TRAINING PROGRAM

## 2024-04-15 PROCEDURE — 76817 TRANSVAGINAL US OBSTETRIC: CPT | Mod: 26,S$PBB,, | Performed by: STUDENT IN AN ORGANIZED HEALTH CARE EDUCATION/TRAINING PROGRAM

## 2024-04-15 PROCEDURE — 76805 OB US >/= 14 WKS SNGL FETUS: CPT | Mod: PBBFAC,PN | Performed by: STUDENT IN AN ORGANIZED HEALTH CARE EDUCATION/TRAINING PROGRAM

## 2024-04-15 PROCEDURE — 76815 OB US LIMITED FETUS(S): CPT | Mod: 26,S$PBB,, | Performed by: STUDENT IN AN ORGANIZED HEALTH CARE EDUCATION/TRAINING PROGRAM

## 2024-04-15 PROCEDURE — 99999 PR PBB SHADOW E&M-EST. PATIENT-LVL II: CPT | Mod: PBBFAC,,, | Performed by: STUDENT IN AN ORGANIZED HEALTH CARE EDUCATION/TRAINING PROGRAM

## 2024-04-15 PROCEDURE — 3075F SYST BP GE 130 - 139MM HG: CPT | Mod: CPTII,,, | Performed by: STUDENT IN AN ORGANIZED HEALTH CARE EDUCATION/TRAINING PROGRAM

## 2024-04-15 NOTE — ASSESSMENT & PLAN NOTE
Previously counseled. Declined cerclage  If  concerns for contractions,  labor, recommend course of  corticosteroids. See above

## 2024-04-15 NOTE — PROGRESS NOTES
Maternal Fetal Medicine Follow up  SUBJECTIVE:     Rosa Alvarez is a 23 y.o.  female with IUP at 25w5d who is seen for [facility] follow up for management of:    Problem   Coagulation Defect Affecting Pregnancy, Antepartum   Short Cervix During Pregnancy in Second Trimester     Previous notes reviewed.   No changes to medical, surgical, family, social, or obstetric history.    Current Outpatient Medications   Medication Instructions    prenatal vit/iron fum/folic ac (PRENATAL 1+1 ORAL) Oral    progesterone (PROMETRIUM) 200 mg, Vaginal, Nightly     Review of patient's allergies indicates:  No Known Allergies  Care team members:  MD Simon - Primary OB  OBJECTIVE:   Blood Pressure: /82   Pulse 103   Wt 76 kg (167 lb 8.8 oz)   LMP 10/12/2023   BMI 31.66 kg/m²   Ultrasound performed. See viewpoint for full ultrasound report.  Live IUP at 25 weeks 5/7 days  Fetus is in breech presentation  Cervix measuring 0.6 cm today similar to prior assessment  ASSESSMENT/PLAN:     23 y.o.  female with IUP at 25w5d presents for [facility] follow up.    Coagulation defect affecting pregnancy, antepartum  Patient discussed with at complex maternal conference as well as with Dr. Young( her hematologist) and Dr. Magaña( [facility] Hematology).  Please see Dr. Young's note from 4/3.     Due to her short cervix, she is at high risk of  delivery. She is asymptomatic however cervix is 0.6 cm in length today similar to last week.  Digital cervical exam last week with minimal dilation 0.5 cm at most      Due to presumed platelet type vWD( testing pending however likely 2a), decision made to deliver at [facility]. I have informed her to go to [facility] hospital if any symptoms of bleeding/ labor and inform for the need to transfer to [facility] if stable.    Dr. Magaña at [facility] is aware and will notify the [facility] on call Hematology physicians( Dr. Fry and Dr. Soto) as well as blood bank. [facility] Hematology has asked for repeat labs.  Patient will have them drawn today.    Discussed  corticosteroids as a group.Will defer to if delivery is expected within 7 days.    Recommendations from Dr. Young( please refer to note for full recs)  Humate P dosing  -60 VWF:RCo units/kg loading dose followed by 40 units/kg every 8-24 hours maintenance.   -Check Levels of Factor 8, VWF, VWF activity daily post-Partum  -Need hematology consult when is hospitalized  Tranexemic Acid  --If heavy bleeding post-partum, can use Tranexemic acid  IV: 10 mg/kg (maximum 600 mg/dose) every 8 hours for up to 5 days or   PO: 650 mg tablets: 1.3 g 3 times daily for up to 5 days    Avoid DDAVP    Recommendations from [facility]  Labs today  [facility] Heme aware. Patient's primary OB notified.   Anesthesia consult placed at [facility]. Can be completed virtually. We will contact them today   Fetus is currently breech- will be for  delivery if delivers in the near future.  If admitted to [facility], please consult Hematology and notify blood bank physician on call  If admitted elsewhere, please consult M    Short cervix during pregnancy in second trimester  Previously counseled. Declined cerclage  If  concerns for contractions,  labor, recommend course of  corticosteroids. See above        F/u in 1 weeks for US/[facility] visit      Electronically Signed by Jodi Yates April 15, 2024

## 2024-04-15 NOTE — ASSESSMENT & PLAN NOTE
Patient discussed with at complex maternal conference as well as with Dr. Young( her hematologist) and Dr. Magaña( [facility] Hematology).  Please see Dr. Young's note from 4/3.     Due to her short cervix, she is at high risk of  delivery. She is asymptomatic however cervix is 0.6 cm in length today similar to last week.  Digital cervical exam last week with minimal dilation 0.5 cm at most      Due to presumed platelet type vWD( testing pending however likely 2a), decision made to deliver at [facility]. I have informed her to go to [facility] if any symptoms of bleeding/ labor and inform for the need to transfer to [facility] if stable.    Dr. Magaña at [facility] is aware and will notify the [facility] on call Hematology physicians( Dr. Fry and Dr. Soto) as well as blood bank. [facility] Hematology has asked for repeat labs. Patient will have them drawn today.    Discussed  corticosteroids as a group.Will defer to if delivery is expected within 7 days.    Recommendations from Dr. Young( please refer to note for full recs)  Humate P dosing  -60 VWF:RCo units/kg loading dose followed by 40 units/kg every 8-24 hours maintenance.   -Check Levels of Factor 8, VWF, VWF activity daily post-Partum  -Need hematology consult when is hospitalized  Tranexemic Acid  --If heavy bleeding post-partum, can use Tranexemic acid  IV: 10 mg/kg (maximum 600 mg/dose) every 8 hours for up to 5 days or   PO: 650 mg tablets: 1.3 g 3 times daily for up to 5 days    Avoid DDAVP    Recommendations from [facility]  Labs today  [facility] Heme aware. Patient's primary OB notified.   Anesthesia consult placed at [facility]. Can be completed virtually. We will contact them today   Fetus is currently breech- will be for  delivery if delivers in the near future.  If admitted to [facility], please consult Hematology and notify blood bank physician on call  If admitted elsewhere, please consult [facility]

## 2024-04-17 ENCOUNTER — PATIENT MESSAGE (OUTPATIENT)
Dept: OTHER | Facility: OTHER | Age: 24
End: 2024-04-17
Payer: MEDICAID

## 2024-04-18 LAB
VON WILLEBRAND EVAL PPP-IMP: NORMAL
VWF MULTIMERS PPP QL: NORMAL

## 2024-04-22 ENCOUNTER — OFFICE VISIT (OUTPATIENT)
Dept: MATERNAL FETAL MEDICINE | Facility: CLINIC | Age: 24
End: 2024-04-22
Attending: FAMILY MEDICINE
Payer: MEDICAID

## 2024-04-22 ENCOUNTER — PROCEDURE VISIT (OUTPATIENT)
Dept: MATERNAL FETAL MEDICINE | Facility: CLINIC | Age: 24
End: 2024-04-22
Payer: MEDICAID

## 2024-04-22 ENCOUNTER — LAB VISIT (OUTPATIENT)
Dept: LAB | Facility: HOSPITAL | Age: 24
End: 2024-04-22
Attending: GENERAL PRACTICE
Payer: MEDICAID

## 2024-04-22 DIAGNOSIS — O26.872 SHORT CERVIX DURING PREGNANCY IN SECOND TRIMESTER: Primary | ICD-10-CM

## 2024-04-22 DIAGNOSIS — O99.119 COAGULATION DEFECT AFFECTING PREGNANCY, ANTEPARTUM: ICD-10-CM

## 2024-04-22 DIAGNOSIS — D68.9 COAGULATION DEFECT AFFECTING PREGNANCY, ANTEPARTUM: ICD-10-CM

## 2024-04-22 DIAGNOSIS — O09.90 SUPERVISION OF HIGH RISK PREGNANCY, ANTEPARTUM: ICD-10-CM

## 2024-04-22 DIAGNOSIS — Z36.89 ENCOUNTER FOR ULTRASOUND TO ASSESS FETAL GROWTH: ICD-10-CM

## 2024-04-22 DIAGNOSIS — O26.872 SHORT CERVIX DURING PREGNANCY IN SECOND TRIMESTER: ICD-10-CM

## 2024-04-22 LAB
BASOPHILS # BLD AUTO: 0.03 K/UL (ref 0–0.2)
BASOPHILS NFR BLD: 0.3 % (ref 0–1.9)
DIFFERENTIAL METHOD BLD: ABNORMAL
EOSINOPHIL # BLD AUTO: 0.1 K/UL (ref 0–0.5)
EOSINOPHIL NFR BLD: 1.4 % (ref 0–8)
ERYTHROCYTE [DISTWIDTH] IN BLOOD BY AUTOMATED COUNT: 13 % (ref 11.5–14.5)
FERRITIN SERPL-MCNC: 18 NG/ML (ref 20–300)
GLUCOSE SERPL-MCNC: 112 MG/DL (ref 70–140)
HCT VFR BLD AUTO: 39.5 % (ref 37–48.5)
HCV AB SERPL QL IA: NORMAL
HGB BLD-MCNC: 12.7 G/DL (ref 12–16)
HIV 1+2 AB+HIV1 P24 AG SERPL QL IA: NORMAL
IMM GRANULOCYTES # BLD AUTO: 0.14 K/UL (ref 0–0.04)
IMM GRANULOCYTES NFR BLD AUTO: 1.4 % (ref 0–0.5)
LYMPHOCYTES # BLD AUTO: 1.6 K/UL (ref 1–4.8)
LYMPHOCYTES NFR BLD: 16.6 % (ref 18–48)
MCH RBC QN AUTO: 29.9 PG (ref 27–31)
MCHC RBC AUTO-ENTMCNC: 32.2 G/DL (ref 32–36)
MCV RBC AUTO: 93 FL (ref 82–98)
MONOCYTES # BLD AUTO: 0.7 K/UL (ref 0.3–1)
MONOCYTES NFR BLD: 7.5 % (ref 4–15)
NEUTROPHILS # BLD AUTO: 7.2 K/UL (ref 1.8–7.7)
NEUTROPHILS NFR BLD: 72.8 % (ref 38–73)
NRBC BLD-RTO: 0 /100 WBC
PLATELET # BLD AUTO: 343 K/UL (ref 150–450)
PMV BLD AUTO: 9.2 FL (ref 9.2–12.9)
RBC # BLD AUTO: 4.25 M/UL (ref 4–5.4)
WBC # BLD AUTO: 9.84 K/UL (ref 3.9–12.7)

## 2024-04-22 PROCEDURE — 76817 TRANSVAGINAL US OBSTETRIC: CPT | Mod: PBBFAC,PN | Performed by: STUDENT IN AN ORGANIZED HEALTH CARE EDUCATION/TRAINING PROGRAM

## 2024-04-22 PROCEDURE — 87591 N.GONORRHOEAE DNA AMP PROB: CPT | Performed by: GENERAL PRACTICE

## 2024-04-22 PROCEDURE — 76815 OB US LIMITED FETUS(S): CPT | Mod: 26,S$PBB,, | Performed by: STUDENT IN AN ORGANIZED HEALTH CARE EDUCATION/TRAINING PROGRAM

## 2024-04-22 PROCEDURE — 99214 OFFICE O/P EST MOD 30 MIN: CPT | Mod: S$PBB,TH,, | Performed by: STUDENT IN AN ORGANIZED HEALTH CARE EDUCATION/TRAINING PROGRAM

## 2024-04-22 PROCEDURE — 87389 HIV-1 AG W/HIV-1&-2 AB AG IA: CPT | Performed by: GENERAL PRACTICE

## 2024-04-22 PROCEDURE — 86803 HEPATITIS C AB TEST: CPT | Performed by: GENERAL PRACTICE

## 2024-04-22 PROCEDURE — 86592 SYPHILIS TEST NON-TREP QUAL: CPT | Performed by: GENERAL PRACTICE

## 2024-04-22 PROCEDURE — 36415 COLL VENOUS BLD VENIPUNCTURE: CPT | Mod: PO | Performed by: GENERAL PRACTICE

## 2024-04-22 PROCEDURE — 82728 ASSAY OF FERRITIN: CPT | Performed by: GENERAL PRACTICE

## 2024-04-22 PROCEDURE — 85025 COMPLETE CBC W/AUTO DIFF WBC: CPT | Performed by: GENERAL PRACTICE

## 2024-04-22 PROCEDURE — 82950 GLUCOSE TEST: CPT | Performed by: GENERAL PRACTICE

## 2024-04-22 NOTE — PROGRESS NOTES
Maternal Fetal Medicine Follow up  SUBJECTIVE:     Rosa Alvarez is a 23 y.o.  female with IUP at 26w5d who is seen for [facility] follow up for management of:    Problem   Coagulation Defect Affecting Pregnancy, Antepartum   Short Cervix During Pregnancy in Second Trimester     Previous notes reviewed.   No changes to medical, surgical, family, social, or obstetric history.    Current Outpatient Medications   Medication Instructions    prenatal vit/iron fum/folic ac (PRENATAL 1+1 ORAL) Oral    progesterone (PROMETRIUM) 200 mg, Vaginal, Nightly     Review of patient's allergies indicates:  No Known Allergies  Care team members:  MD Simon - Primary OB  OBJECTIVE:     Ultrasound performed. See viewpoint for full ultrasound report.  IUP at 26w5d  Fetus is in breech presentation  Fetal heart tones 134 bpm  ASSESSMENT/PLAN:     23 y.o.  female with IUP at 26w5d presents for [facility] follow up.    Coagulation defect affecting pregnancy, antepartum  Patient discussed with at complex maternal conference as well as with Dr. Young( her hematologist) and Dr. Magaña( [facility] Hematology).  Please see Dr. Young's note from 4/3.     Rosa has a presumed platelet type vWD and short cervix.   She was previously awaiting lab results to confirm the type of vWD. Some of these lab results have resulted from [facility] and she also has an appointment with Dr. Young tomorrow.   I have informed Dr. Magaña who will look through these results.  Current plan is to deliver at [facility]. I have informed her to go to nearest hospital if any symptoms of bleeding/ labor and inform for the need to transfer to [facility] if stable.      Discussed  corticosteroids as a group.Will defer to if delivery is expected within 7 days.    Recommendations from Dr. Young( please refer to note for full recs)  Humate P dosing  -60 VWF:RCo units/kg loading dose followed by 40 units/kg every 8-24 hours maintenance.   -Check Levels of Factor 8, VWF, VWF activity  daily post-Partum  -Need hematology consult when is hospitalized  Tranexemic Acid  --If heavy bleeding post-partum, can use Tranexemic acid  IV: 10 mg/kg (maximum 600 mg/dose) every 8 hours for up to 5 days or   PO: 650 mg tablets: 1.3 g 3 times daily for up to 5 days    Avoid DDAVP    Recommendations from [facility]  Follow up in one week   Will addend plan of care as needed once all of her labs have returned and have been reviewed  [facility] Heme aware. Patient's primary OB notified.   Anesthesia consult placed. Patient was not able to attend due to traffic. We will reach out to Anesthesia team again for new appointment  Fetus is currently breech- will be for  delivery if delivers in the near future.  If admitted to [facility], please consult Hematology and notify blood bank physician on call  If admitted elsewhere, please consult [facility]    Short cervix during pregnancy in second trimester  Previously counseled. Declined cerclage  If  concerns for contractions,  labor, recommend course of  corticosteroids. See above      Jodi Yates  Maternal-Fetal Medicine    Electronically Signed by Jodi Yates 2024

## 2024-04-22 NOTE — ASSESSMENT & PLAN NOTE
Patient discussed with at complex maternal conference as well as with Dr. Young( her hematologist) and Dr. Magaña( [facility] Hematology).  Please see Dr. Young's note from 4/3.     Rosa has a presumed platelet type vWD and short cervix.   She was previously awaiting lab results to confirm the type of vWD. Some of these lab results have resulted from [facility] and she also has an appointment with Dr. Young tomorrow.   I have informed Dr. Magaña who will look through these results.  Current plan is to deliver at [facility]. I have informed her to go to nearest hospital if any symptoms of bleeding/ labor and inform for the need to transfer to [facility] if stable.      Discussed  corticosteroids as a group.Will defer to if delivery is expected within 7 days.    Recommendations from Dr. Young( please refer to note for full recs)  Humate P dosing  -60 VWF:RCo units/kg loading dose followed by 40 units/kg every 8-24 hours maintenance.   -Check Levels of Factor 8, VWF, VWF activity daily post-Partum  -Need hematology consult when is hospitalized  Tranexemic Acid  --If heavy bleeding post-partum, can use Tranexemic acid  IV: 10 mg/kg (maximum 600 mg/dose) every 8 hours for up to 5 days or   PO: 650 mg tablets: 1.3 g 3 times daily for up to 5 days    Avoid DDAVP    Recommendations from [facility]  Follow up in one week   Will addend plan of care as needed once all of her labs have returned and have been reviewed  [facility] Heme aware. Patient's primary OB notified.   Anesthesia consult placed. Patient was not able to attend due to traffic. We will reach out to Anesthesia team again for new appointment  Fetus is currently breech- will be for  delivery if delivers in the near future.  If admitted to [facility], please consult Hematology and notify blood bank physician on call  If admitted elsewhere, please consult [facility]

## 2024-04-23 LAB
C TRACH DNA SPEC QL NAA+PROBE: NOT DETECTED
N GONORRHOEA DNA SPEC QL NAA+PROBE: NOT DETECTED
RPR SER QL: NORMAL

## 2024-04-24 ENCOUNTER — DOCUMENTATION ONLY (OUTPATIENT)
Dept: HEMATOLOGY/ONCOLOGY | Facility: CLINIC | Age: 24
End: 2024-04-24
Payer: MEDICAID

## 2024-04-24 NOTE — PROGRESS NOTES
Recommendations for management of von Willebrand's disease in the postpartum.    Humate-P 50 units/kilogram following delivery then 25 units/kilogram every 12-24 hours for 3-5 days.    Tranexamic acid 25 milligrams/kilogram orally every 6-8 hours for 5 days.    Notify Hematology Oncology and blood bank when patient is admitted.

## 2024-04-26 ENCOUNTER — ROUTINE PRENATAL (OUTPATIENT)
Dept: OBSTETRICS AND GYNECOLOGY | Facility: CLINIC | Age: 24
End: 2024-04-26
Payer: MEDICAID

## 2024-04-26 VITALS
SYSTOLIC BLOOD PRESSURE: 132 MMHG | DIASTOLIC BLOOD PRESSURE: 86 MMHG | HEART RATE: 94 BPM | WEIGHT: 169.44 LBS | BODY MASS INDEX: 32.01 KG/M2

## 2024-04-26 DIAGNOSIS — O09.90 SUPERVISION OF HIGH RISK PREGNANCY, ANTEPARTUM: Primary | ICD-10-CM

## 2024-04-26 PROCEDURE — 99212 OFFICE O/P EST SF 10 MIN: CPT | Mod: PBBFAC,TH,PO | Performed by: GENERAL PRACTICE

## 2024-04-26 PROCEDURE — 90715 TDAP VACCINE 7 YRS/> IM: CPT | Mod: PBBFAC,PO

## 2024-04-26 PROCEDURE — 90471 IMMUNIZATION ADMIN: CPT | Mod: PBBFAC,PO

## 2024-04-26 PROCEDURE — 99999PBSHW TDAP VACCINE GREATER THAN OR EQUAL TO 7YO IM: Mod: PBBFAC,,,

## 2024-04-26 PROCEDURE — 99213 OFFICE O/P EST LOW 20 MIN: CPT | Mod: S$PBB,TH,, | Performed by: GENERAL PRACTICE

## 2024-04-26 PROCEDURE — 99999 PR PBB SHADOW E&M-EST. PATIENT-LVL II: CPT | Mod: PBBFAC,,, | Performed by: GENERAL PRACTICE

## 2024-04-26 RX ORDER — FERROUS SULFATE 325(65) MG
325 TABLET, DELAYED RELEASE (ENTERIC COATED) ORAL
Qty: 48 TABLET | Refills: 3 | Status: SHIPPED | OUTPATIENT
Start: 2024-04-27

## 2024-04-26 NOTE — PROGRESS NOTES
"  TODAY'S PROGRESS NOTE  2024    INDIANA Lee is a 23 y.o.  at 27w2d who presents for routine OB appointment.  She denies VB, LOF, CTX's.  She reports normal FM.    Seeing Dr. Yates weekly.    Missed her appointment with Anesthesia due to 2hr traffic delay going into [facility].    Rosa heard from Dr. Young yesterday that she for sure has Type 2a VWDz.    O/  VITALS:  Vitals:    24 1522   BP: 132/86   Pulse: 94     FH: 28cm  DT'S: 135bpm by doppler - found in RLQ - cephalic?    Date 24 @ 27wks:  CBC: 10 >--- 13 / 40 ---< 343  1HR GDM SCREEN: Negative and 112  Ferritin 18    RPR / HIV / HEP C AB = all three neg  CT / NG = neg x 2    US (4/15/24 @ 25+5wks):  CVX 6mm, breech    US (24 @ 26+5wks):  breech presentation, normal MAY, FHR 134bpm      A/P  27w2d for routine OB appointment.    Will reach out to [facility] Nurse Navigator to see about helping patient re-schedule her Anesthesia appointment  28wk labs reviewed with the patient; no further testing indicated; Patient is Rh positive  Recommend iron supplement due to low ferritin (though normal Hgb/Hct).  Written information and rationale for TDAP vaccine provided to patient; TDAP administered today in clinic.  PTL precautions reviewed; FMC reviewed.  Reminded patient that regular FMC are expected starting ~28wks.  RTC ~32wks EGA for joan RODRIGUEZ MD   OB PROBLEM LIST:    Von Willebrand Disease, Type 2a, follows with Dr. Juan Young      [  ] deliver @ [facility], hematology recommendations below      [  ] Anesthesia consult @ [facility]      [  ] notify blood bank and consult Hematology @ admission      Short cervix (no prior PTD)    [  ] vaginal prog 200mg QHS    [  ] serial CVX length    [  ]  steroids when delivery expected within 7d      Breech fetus @ 27wks      Chlamydia @ [facility] (DOUGLAS neg, T3 STD testing negative)   FINAL EDC:    2024 by US done 2023 @ 6-1/7 week    Baby: Boy "Thelma"    SO Name: Pedrito GALVEZ " SCAN:    2/19/2024 @ 17+5wks: anatomy normal but suboptimal views of profile and face.    CVX 2.1 cm --> 1wk f/u  Anterior placenta no previa    2/27/24 @ 18+6wks: CVX 2.6cm --> 2wks f/u    3/11/24 @ 20+5wks: s=d, anatomy survey completed and wnl, CVX 2.0cm --> 2wks f/u      INITIAL LABS:  DATE: 11/15/2023  MBT: B positive  AB SCREEN: negative  RPR: negative  RUBELLA: Immune  HEPATITIS A/B/C: negative  HIV: negative  CBC: 2/20/2024 - 10.5 >--- 14.2 / 39.6 ---< 399  HGB: Sickle cell negative  URINE CX: neg (3/6/24)  UDS: Negative  Other:   TSH: 1.050   10-12 WEEK LABS:    PAP: ASCUS / HPV Negative / Date: 11/20/2023    VALDO/CHLAM: chlam + / valdo - @ NOB  --> neg x 2 (21 MAR 2024)    cfDNA ([id number]): to lab today    CARRIER SCREEN (Inheritest Core): to lab today   28 WEEK LABS:    Date 4/22/24 @ 27wks:    CBC: 10 >--- 13 / 40 ---< 343    1HR GDM SCREEN: Negative and 112 / Ferritin 18    RPR / HIV / HEP C AB = all three neg    CT / NG = neg x 2     OTHER LABS:  GBS: ___    11/30/2023:  FVIII 74% and VWF Ag 81% - both wnl  Vwf ACTIVITY( vWF: RCO) 43% (normal range )     1/10/24:  FVIII and vWF Ag 105% and 101% - both wnl  vWF activity: 35% - low    OTHER ULTRASOUNDS:    GROWTH US (4/8/24 @ 24+5wks):  EFW 687g = 35th percentile, breech presentation, (1lb 8oz)  Cervical length = 4.5mm    US (4/15/24 @ 25+5wks):  CVX 6mm, breech TO-DO THROUGHOUT PREGNANCY:    Depression Screen: 3/6/24    TDAP (27-36wks): 4/26/24    Plans to breastfeed: yes    Plans for epidural: ? With vWF ?    Classes at hospital: offered 3/21/24    Postpartum contraception: Nexplanon vs IUD    Consent for delivery: 4/9/24   MEDICATIONS:    Prenatal vitamins  Vaginal progesterone ALLERGIES:    NKDA    MEDICAL HISTORY:    Von Willebrand's disease     Pre-pregnancy BMI = 25.5 SURGICAL HISTORY:    Negative    SOCIAL HISTORY:    Smoker: non-smoker  Alcohol: denies  Drugs: denies  Relationship: single  Domestic Violence: no  Lives with: Mother &  [place]  Education Level: Some College  Occupation: homemaker  Yarsanism: No preference  Other:   GYN HISTORY:    PAP'S: ASCUS with Neg HPV  STI'S: recent diagnosis: chlamydia  GENITAL HSV: no FAMILY HISTORY:    HTN: No  DIABETES: No  BLEEDING D/O: No  CLOTTING D/O: No  BIRTH DEFECTS: No  MENTAL DISABILITY: No  TWINS OR MORE: No  GYN CANCER: No  Other:     OBSTETRIC HISTORY   Month/Year Mode of Delivery EGA Wt. M/F Complications / Comments   G1          From Dr. Magaña's (Heme-Onc) note 4/24/24:  Recommendations for management of von Willebrand's disease in the postpartum:   Humate-P 50 units/kilogram following delivery then 25 units/kilogram every 12-24 hours for 3-5 days.  Tranexamic acid 25 milligrams/kilogram orally every 6-8 hours for 5 days.  Notify Hematology Oncology and blood bank when patient is admitted.

## 2024-04-26 NOTE — PATIENT INSTRUCTIONS
To schedule classes (see below for what's offered) at the hospital, call [phone].    Have a question or concern?    PRO TIP: Program these phone numbers in your cell phone!    for an emergency  call 911 or go to the nearest hospital Especially after 20 weeks of the pregnancy, please remember that  Labor & Delivery is at [facility].  There is no L&D at [facility] (formerly called [facility]).  After hours you can only access L&D through the Emergency Room (entrance on [facility]).   [facility] Nurse Care Advice Line  [phone] At any time during your pregnancy,  you can speak to a nurse 24-7.   for non-urgent issues, send us a  message in [employer] Consider calling the Nurse Care Advice Line if it's a weekend or  toward the end of the work-day since  [employer] and phone messages may not be answered for a day or two.   for non-urgent issues, call the clinic  [phone], Option 3    Labor & Delivery  [phone] Starting at 20 weeks of the pregnancy,  you can speak to a nurse on L&D 24-7.     SECOND TRIMESTER  14+0 - 28+6 weeks    Adapting to Pregnancy: Second Trimester   Keep up the healthy habits you started in your first trimester.  It's not too late to make better choices and drop bad habits - your baby's counting on you!  Most women enjoy this middle part of the pregnancy: first trimester fatigue and morning sickness are probably behind you, and your belly's not yet getting in the way physically.    Your To-Do List  There are several things you should start working on.  More information on these topics can be found in your OB Welcome Packet and the A-Z Book.    Choose a pediatrician for your baby.  Sign up for a tour and classes at the hospital.  All classes are free of charge if you are delivering at [facility].  Call [phone] to register for any of these classes:  Baby Love (learn about the delivery process and caring for a )  Big Brother / Big Sister Class (to help siblings prepare for  baby)  Lamaze (a 4 week class to learn about natural interventions for labor)  Breastfeeding (get a head start learning about breastfeeding)  Work on some methods for coping with the pains of labor.  A good bit of labor happens before you can get an epidural, and you'll feel more confident if you have a plan that you've practiced.  We encourage everyone to breastfeed if they can (and most women can!).  Please ask us questions if you have them.  Decide what you'd like to use for contraception (birth control) after this baby is born.  If you're having a boy, let us know if you plan to have him circumcised.    Pregnancy Milestones  After week 16, avoid lying on your back for more than a few minutes. Instead, lie on your side and switch sides often.  Between 19 and 22 weeks you'll have an ultrasound to look at the baby's anatomy and determine the gender (if you want to know and don't already know). This is around the same time when you should start feeling baby's movements and kicks.  Your gestational diabetes test will be done around 28 weeks.  We'll give you a TDAP booster shot so that your baby is protected from Whooping Cough (pertussis) his/her first few months of life.    When You Travel   The second trimester is a good time for travel. Talk to your health care provider about any special plans you may need to make. Always:   Wear a seat belt. Fasten the lap part under your belly. Wear the shoulder part also.   Take frequent breaks during long trips by car or plane. Move around to stretch your legs.   Drink plenty of fluids on flights. The air in plane cabins is very dry.   Avoid hot climates or high altitudes if you are not used to them.   Avoid places where the food and water might make you sick.    Intimacy  Unless your health care provider tells you otherwise, it's perfectly fine to continue having sex. In fact, many women find sex in the second trimester quite enjoyable due to increased blood supply to the  pelvic area.    Baby!  Organs continue to develop and begin to function, there's formation of eyebrows / eyelashes / fingernails, skin is wrinkled and covered in vernix, genitals develop, a fine hair called lanugo covers the body, and baby is busy: kicking, sleeping, swallowing amniotic fluid, listening to you, passing urine, and sucking those thumbs.    OTHER INFORMATION:  If your provider orders labs or other studies, you probably won't hear from us unless something is abnormal.  How we define normal is different for many things in pregnancy.  This includes several of the numbers on a Complete Blood Count (CBC).  If your result is flagged as abnormal in [employer] but you don't hear from us, it's probably because things are actually normal based on where you are in your pregnancy.

## 2024-04-29 ENCOUNTER — DOCUMENTATION ONLY (OUTPATIENT)
Dept: MATERNAL FETAL MEDICINE | Facility: CLINIC | Age: 24
End: 2024-04-29

## 2024-04-29 NOTE — PROGRESS NOTES
Patient no show for appointment today. We will attempt to schedule later this week with virtual Anesthesia consult.    I discussed her vWD diagnosis with Dr. Young as well as Dr. Magaña.   Previously discussed vWD as Type 2A vs Type 2M.    Our working diagnosis is for Type 2 M vWD although will need follow up postpartum as well.    Please see Dr. Magaña's note from 4/24/24. Copied below       Recommendations for management of von Willebrand's disease in the postpartum.     Humate-P 50 units/kilogram following delivery then 25 units/kilogram every 12-24 hours for 3-5 days.    Tranexamic acid 25 milligrams/kilogram orally every 6-8 hours for 5 days.     Notify Hematology Oncology and blood bank when patient is admitted.        Discussed with Dr. Magaña. She does not require a loading dose prior to C section. The Humate P can be administered immediately after delivery as vWF levels can take hours to decline  after delivery.

## 2024-05-01 ENCOUNTER — PATIENT MESSAGE (OUTPATIENT)
Dept: OTHER | Facility: OTHER | Age: 24
End: 2024-05-01
Payer: MEDICAID

## 2024-05-01 ENCOUNTER — PROCEDURE VISIT (OUTPATIENT)
Dept: MATERNAL FETAL MEDICINE | Facility: CLINIC | Age: 24
End: 2024-05-01
Payer: MEDICAID

## 2024-05-01 ENCOUNTER — OFFICE VISIT (OUTPATIENT)
Dept: MATERNAL FETAL MEDICINE | Facility: CLINIC | Age: 24
End: 2024-05-01
Payer: MEDICAID

## 2024-05-01 ENCOUNTER — PATIENT MESSAGE (OUTPATIENT)
Dept: MATERNAL FETAL MEDICINE | Facility: CLINIC | Age: 24
End: 2024-05-01

## 2024-05-01 VITALS
DIASTOLIC BLOOD PRESSURE: 77 MMHG | SYSTOLIC BLOOD PRESSURE: 129 MMHG | WEIGHT: 171.94 LBS | BODY MASS INDEX: 32.49 KG/M2 | HEART RATE: 95 BPM

## 2024-05-01 DIAGNOSIS — O26.872 SHORT CERVIX DURING PREGNANCY IN SECOND TRIMESTER: ICD-10-CM

## 2024-05-01 DIAGNOSIS — O99.119 COAGULATION DEFECT AFFECTING PREGNANCY, ANTEPARTUM: Primary | ICD-10-CM

## 2024-05-01 DIAGNOSIS — D68.9 COAGULATION DEFECT AFFECTING PREGNANCY, ANTEPARTUM: Primary | ICD-10-CM

## 2024-05-01 PROCEDURE — 1159F MED LIST DOCD IN RCRD: CPT | Mod: CPTII,,, | Performed by: STUDENT IN AN ORGANIZED HEALTH CARE EDUCATION/TRAINING PROGRAM

## 2024-05-01 PROCEDURE — 3078F DIAST BP <80 MM HG: CPT | Mod: CPTII,,, | Performed by: STUDENT IN AN ORGANIZED HEALTH CARE EDUCATION/TRAINING PROGRAM

## 2024-05-01 PROCEDURE — 76816 OB US FOLLOW-UP PER FETUS: CPT | Mod: PBBFAC,PN | Performed by: STUDENT IN AN ORGANIZED HEALTH CARE EDUCATION/TRAINING PROGRAM

## 2024-05-01 PROCEDURE — 99212 OFFICE O/P EST SF 10 MIN: CPT | Mod: PBBFAC,TH,PN,25 | Performed by: STUDENT IN AN ORGANIZED HEALTH CARE EDUCATION/TRAINING PROGRAM

## 2024-05-01 PROCEDURE — 3074F SYST BP LT 130 MM HG: CPT | Mod: CPTII,,, | Performed by: STUDENT IN AN ORGANIZED HEALTH CARE EDUCATION/TRAINING PROGRAM

## 2024-05-01 PROCEDURE — 99999 PR PBB SHADOW E&M-EST. PATIENT-LVL II: CPT | Mod: PBBFAC,,, | Performed by: STUDENT IN AN ORGANIZED HEALTH CARE EDUCATION/TRAINING PROGRAM

## 2024-05-01 PROCEDURE — 3008F BODY MASS INDEX DOCD: CPT | Mod: CPTII,,, | Performed by: STUDENT IN AN ORGANIZED HEALTH CARE EDUCATION/TRAINING PROGRAM

## 2024-05-01 PROCEDURE — 99214 OFFICE O/P EST MOD 30 MIN: CPT | Mod: S$PBB,TH,, | Performed by: STUDENT IN AN ORGANIZED HEALTH CARE EDUCATION/TRAINING PROGRAM

## 2024-05-01 NOTE — ASSESSMENT & PLAN NOTE
Previously counseled. Declined cerclage  If  concerns for contractions,  labor, recommend course of  corticosteroids.

## 2024-05-01 NOTE — ASSESSMENT & PLAN NOTE
Patient discussed with at complex maternal conference as well as with Dr. Young( her hematologist) and Dr. Magaña( [facility] Hematology).  Rosa has presumed type Type 2M vWD in the setting of a short cervix.  If she presents in labor, plan is for delivery at [facility]. Her baby is in breech presentation therefore delivery would be via  section at this time.  Dr. Magaña's note from  is copied below for plan of care postpartum. After discussion with Dr. Magaña,due to the delayed decrease  in vWF after delivery(can take a few hours to occur),there is no need for a loading dose of Humate P.  Fetal growth is AGA. We reviewed labor and bleeding precautions.      Recommendations for management of von Willebrand's disease in the postpartum.     Humate-P 50 units/kilogram following delivery then 25 units/kilogram every 12-24 hours for 3-5 days.    Tranexamic acid 25 milligrams/kilogram orally every 6-8 hours for 5 days.     Notify Hematology Oncology and blood bank when patient is admitted.    Recommendations from [facility]  Follow up growth and MD visit in 4 weeks   Anesthesia consult replaced and scheduled for Tuesday.  If admitted to [facility], please consult Hematology and notify blood bank physician on call  If admitted elsewhere, please consult [facility]

## 2024-05-01 NOTE — PROGRESS NOTES
Maternal Fetal Medicine Follow up  SUBJECTIVE:     Rosa Alvarez is a 23 y.o.  female with IUP at 28w0d who is seen for MFM follow up for management of:    Problem   Coagulation Defect Affecting Pregnancy, Antepartum   Short Cervix During Pregnancy in Second Trimester     Previous notes reviewed.   No changes to medical, surgical, family, social, or obstetric history.      Current Outpatient Medications   Medication Instructions    ferrous sulfate 325 mg, Oral, Four times weekly    prenatal vit/iron fum/folic ac (PRENATAL 1+1 ORAL) Oral    progesterone (PROMETRIUM) 200 mg, Vaginal, Nightly     Review of patient's allergies indicates:  No Known Allergies  Care team members:  MD Simon - Primary OB  OBJECTIVE:   Blood Pressure: /77   Pulse 95   Wt 78 kg (171 lb 15.3 oz)   LMP 10/12/2023   BMI 32.49 kg/m²   Ultrasound performed. See viewpoint for full ultrasound report.  Fetal size is AGA with the EFW at the 37% and the AC at the 22%. The EFW is 1091 g.  A limited repeat fetal anatomic survey shows no abnormalities of the structures that were adequately imaged.  AFV is normal.   ASSESSMENT/PLAN:     23 y.o.  female with IUP at 28w0d presents for MFM follow up.    Coagulation defect affecting pregnancy, antepartum  Patient discussed with at complex maternal conference as well as with Dr. Young( her hematologist) and Dr. Magaña( [facility] Hematology).  oRsa has presumed type Type 2M vWD in the setting of a short cervix.  If she presents in labor, plan is for delivery at [facility]. Her baby is in breech presentation therefore delivery would be via  section at this time.  Dr. Magaña's note from  is copied below for plan of care postpartum. After discussion with Dr. Magaña,due to the delayed decrease  in vWF after delivery(can take a few hours to occur),there is no need for a loading dose of Humate P.  Fetal growth is AGA. We reviewed labor and bleeding precautions.      Recommendations for management  of von Willebrand's disease in the postpartum.     Humate-P 50 units/kilogram following delivery then 25 units/kilogram every 12-24 hours for 3-5 days.    Tranexamic acid 25 milligrams/kilogram orally every 6-8 hours for 5 days.     Notify Hematology Oncology and blood bank when patient is admitted.    Recommendations from M  Follow up growth and MD visit in 4 weeks   Anesthesia consult replaced and scheduled for Tuesday.  If admitted to [facility], please consult Hematology and notify blood bank physician on call  If admitted elsewhere, please consult [facility]    Short cervix during pregnancy in second trimester  Previously counseled. Declined cerclage  If  concerns for contractions,  labor, recommend course of  corticosteroids.         FOLLOW UP:   F/u in 4 weeks for US/[facility] visit    Jodi Yates  Maternal-Fetal Medicine    Electronically Signed by Jodi Yates May 1, 2024

## 2024-05-01 NOTE — PROGRESS NOTES
OB Anesthesia Consult scheduled while patient in [facility] clinic this morning. Date, Time, Instructions given to patient. Message sent to patient via portal with information as well. Pt verbalizes understanding. Importance of appointment emphasized due to diagnosis. Pt verbalizes understanding.

## 2024-05-06 ENCOUNTER — PATIENT MESSAGE (OUTPATIENT)
Dept: MATERNAL FETAL MEDICINE | Facility: CLINIC | Age: 24
End: 2024-05-06
Payer: MEDICAID

## 2024-05-07 ENCOUNTER — OFFICE VISIT (OUTPATIENT)
Dept: ANESTHESIOLOGY | Facility: OTHER | Age: 24
End: 2024-05-07
Payer: MEDICAID

## 2024-05-07 DIAGNOSIS — O99.112 OTHER DISEASES OF THE BLOOD AND BLOOD-FORMING ORGANS AND CERTAIN DISORDERS INVOLVING THE IMMUNE MECHANISM COMPLICATING PREGNANCY, SECOND TRIMESTER: ICD-10-CM

## 2024-05-07 NOTE — CONSULTS
Consults    Outpatient OB Anesthesia Consult      Date and Time: 2024 12:00 PM     Request from: Dr. Milan ANGELES requesting physician or midwife: Yes    Reason for Consult: Anesthetic recommendations for delivery    Initial Consult?: Yes    Chief Complaint: Von Willebrand Disease Type 2N    HPI: Patient is a 23 y.o. year old woman,   presenting with vWD type 2N. 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 Subsequent workup has revealed type 2 vWD per hematology and MFM evaluation.   She denies any heavy nose bleeds or bleeding episodes this pregnancy. She has denies any cardiopulmonary disease, alteration in hepatic function, renal insufficiency or musculoskeletal abnormalities.    Past medical history:  vWD    Past surgical history:    No past surgical history on file.    Family history:    She denies any family history of complications with general anesthesia.    Social History:    Social History     Socioeconomic History    Marital status: Single   Tobacco Use    Smoking status: Former     Current packs/day: 0.00     Types: Cigarettes, Vaping with nicotine     Quit date: 2023     Years since quittin.4    Smokeless tobacco: Never   Substance and Sexual Activity    Alcohol use: Never    Drug use: Never    Sexual activity: Not Currently     Partners: Male       Medication:    Current Outpatient Medications on File Prior to Visit   Medication Sig Dispense Refill    ferrous sulfate 325 (65 FE) MG EC tablet Take 1 tablet (325 mg total) by mouth 4 (four) times a week. 48 tablet 3    prenatal vit/iron fum/folic ac (PRENATAL 1+1 ORAL) Take by mouth.      progesterone (PROMETRIUM) 200 MG capsule Place 1 capsule (200 mg total) vaginally nightly. 60 capsule 3     No current facility-administered medications on file prior to visit.       Allergies:    Patient has no known allergies.    Family or personal history of anesthetic  complications: No    Diagnostic Studies: I have reviewed the following relevant findings as noted below:  CBC:  Lab Results   Component Value Date    WBC 9.84 04/22/2024    HGB 12.7 04/22/2024    HCT 39.5 04/22/2024    MCV 93 04/22/2024     04/22/2024      CMP:  Sodium   Date Value Ref Range Status   11/24/2023 137 136 - 145 mmol/L Final     Potassium   Date Value Ref Range Status   11/24/2023 3.5 3.5 - 5.1 mmol/L Final     Chloride   Date Value Ref Range Status   11/24/2023 103 95 - 110 mmol/L Final     CO2   Date Value Ref Range Status   11/24/2023 25 23 - 29 mmol/L Final     Glucose   Date Value Ref Range Status   11/24/2023 87 70 - 110 mg/dL Final     BUN   Date Value Ref Range Status   11/24/2023 11 6 - 20 mg/dL Final     Creatinine   Date Value Ref Range Status   11/24/2023 0.7 0.5 - 1.4 mg/dL Final     Calcium   Date Value Ref Range Status   11/24/2023 10.0 8.7 - 10.5 mg/dL Final     Total Protein   Date Value Ref Range Status   11/24/2023 7.6 6.0 - 8.4 g/dL Final     Albumin   Date Value Ref Range Status   11/24/2023 4.8 3.5 - 5.2 g/dL Final     Total Bilirubin   Date Value Ref Range Status   11/24/2023 0.3 0.1 - 1.0 mg/dL Final     Comment:     For infants and newborns, interpretation of results should be based  on gestational age, weight and in agreement with clinical  observations.    Premature Infant recommended reference ranges:  Up to 24 hours.............<8.0 mg/dL  Up to 48 hours............<12.0 mg/dL  3-5 days..................<15.0 mg/dL  6-29 days.................<15.0 mg/dL       Alkaline Phosphatase   Date Value Ref Range Status   11/24/2023 47 (L) 55 - 135 U/L Final     AST   Date Value Ref Range Status   11/24/2023 28 10 - 40 U/L Final     ALT   Date Value Ref Range Status   11/24/2023 52 (H) 10 - 44 U/L Final     Anion Gap   Date Value Ref Range Status   11/24/2023 9 8 - 16 mmol/L Final     BMP:   Lab Results   Component Value Date     11/24/2023    K 3.5 11/24/2023      2023    CO2 25 2023    BUN 11 2023    CREATININE 0.7 2023    CALCIUM 10.0 2023    ANIONGAP 9 2023     EKG [2021]: Sinus Rhythm with short NJ    Review of Systems:   Constitutional: Negative for fever and chills; well appearing female  Eyes: no visual changes  Ear, nose, mouth and throat: no loose or broken teeth  Cardiovascular: no chest pain  Respiratory: no shortness of breath  Gastrointestinal: no nausea or vomiting  Genitourinary: no dysuria  Musculoskeletal: no joint pain  Skin: warm and dry, no rashes  Neurologic: no seizures  Psychiatric: no anxiety or depression  Endocrinology: no heat or cold intolerance  Hematologic: recurrent nose bleeds prior to pregnancy      Physical Exam:  Vitals:    See OB Visit Vitals  Constitutional: alert, no distress  Eyes: normal lids, pupils symmetric  Ear, nose, mouth and throat: Mallampati III, normal thyromental distance, normal lips, teeth, gums and tongue   Cardiovascular: normal rate, no murmurs  Respiratory: normal effort, no wheezes  Musculoskeletal: normal gait, normal range of motion  Skin: no rashes, no induration  Neurologic: normal reflexes and sensation  Psychiatric: oriented to person, place, time; normal affect    ASA Score: 3    Assessment:  23 year old  with a history of Type 2N vWD. She has been evaluated by hematology and MFM with a clear post-partum plan which is as follows:   Humate-P 50 units/kilogram following delivery then 25 units/kilogram every 12-24 hours for 3-5 days.    Tranexamic acid 25 milligram    Regarding intrapartum anesthetic management, there is little data on Ms. Alvarez's specific type of vWD and neuraxial procedures. The american hematology association gives recommendation for neuraxial anesthesia to be performed if vWF activity is >50% in vWD. This safe threshold has been well documented with type 1 vWD.  It is reassuring that her most recent activity level was 84% and she has not had any  bleeding episodes during this pregnancy.    Recommendations/Plan:  Given the lack of data and the potential manifestations of this disease, the decision on neuraxial anesthesia will be dependent on recent lab values of vWF activity and the anesthesia provider present at the time of patient arrival.     Plan on a repeat vWF activity level and assess the need for humate P administration at the time of delivery. If activity >50% then patient is okay to receive neuraxial anesthesia.     The fetus is currently breech and the delivery plan at this time is a . We discussed in detail general anesthesia and neuraxial anesthesia.    If the fetus is vertex at the time of patient arrival then the plan will be for a vaginal delivery. We discussed her anesthetic options regarding labor pain relief being an epidural, nitrous oxide and remifentanil PCA. She was informed that in the event she utilizes the remifentanil PCA, there is a chance of respiratory depression in the fetus at time of delivery and thus NICU will be present.    Entertained and answered all questions to the patient's satisfaction.    Complexity: High      Jie Hamilton MD

## 2024-05-09 ENCOUNTER — HOSPITAL ENCOUNTER (OUTPATIENT)
Facility: HOSPITAL | Age: 24
Discharge: ANOTHER HOSPITAL | End: 2024-05-09
Attending: GENERAL PRACTICE | Admitting: GENERAL PRACTICE
Payer: MEDICAID

## 2024-05-09 ENCOUNTER — HOSPITAL ENCOUNTER (OUTPATIENT)
Facility: OTHER | Age: 24
Discharge: HOME | End: 2024-05-11
Attending: OBSTETRICS & GYNECOLOGY | Admitting: OBSTETRICS & GYNECOLOGY
Payer: MEDICAID

## 2024-05-09 VITALS — DIASTOLIC BLOOD PRESSURE: 79 MMHG | SYSTOLIC BLOOD PRESSURE: 124 MMHG | HEART RATE: 107 BPM | OXYGEN SATURATION: 98 %

## 2024-05-09 DIAGNOSIS — D68.00 VON WILLEBRAND DISEASE: ICD-10-CM

## 2024-05-09 DIAGNOSIS — Z3A.29 29 WEEKS GESTATION OF PREGNANCY: ICD-10-CM

## 2024-05-09 DIAGNOSIS — O46.90 VAGINAL BLEEDING DURING PREGNANCY: ICD-10-CM

## 2024-05-09 DIAGNOSIS — O46.93 VAGINAL BLEEDING IN PREGNANCY, THIRD TRIMESTER: Primary | ICD-10-CM

## 2024-05-09 PROBLEM — O34.33 PREMATURE CERVICAL DILATION IN THIRD TRIMESTER: Status: ACTIVE | Noted: 2024-05-09

## 2024-05-09 PROCEDURE — 59025 FETAL NON-STRESS TEST: CPT

## 2024-05-09 PROCEDURE — 59025 FETAL NON-STRESS TEST: CPT | Mod: 26,59,, | Performed by: OBSTETRICS & GYNECOLOGY

## 2024-05-09 PROCEDURE — 99223 1ST HOSP IP/OBS HIGH 75: CPT | Mod: 25,,, | Performed by: OBSTETRICS & GYNECOLOGY

## 2024-05-09 PROCEDURE — 85397 CLOTTING FUNCT ACTIVITY: CPT

## 2024-05-09 PROCEDURE — 87389 HIV-1 AG W/HIV-1&-2 AB AG IA: CPT

## 2024-05-09 PROCEDURE — 86593 SYPHILIS TEST NON-TREP QUANT: CPT

## 2024-05-09 PROCEDURE — 76815 OB US LIMITED FETUS(S): CPT | Mod: 26,,, | Performed by: OBSTETRICS & GYNECOLOGY

## 2024-05-09 PROCEDURE — 86901 BLOOD TYPING SEROLOGIC RH(D): CPT

## 2024-05-09 PROCEDURE — 85025 COMPLETE CBC W/AUTO DIFF WBC: CPT

## 2024-05-09 PROCEDURE — 96372 THER/PROPH/DIAG INJ SC/IM: CPT

## 2024-05-09 PROCEDURE — 87340 HEPATITIS B SURFACE AG IA: CPT

## 2024-05-09 PROCEDURE — 81514 NFCT DS BV&VAGINITIS DNA ALG: CPT

## 2024-05-09 PROCEDURE — 99285 EMERGENCY DEPT VISIT HI MDM: CPT | Mod: 25

## 2024-05-09 PROCEDURE — 99284 EMERGENCY DEPT VISIT MOD MDM: CPT | Mod: 25,,, | Performed by: OBSTETRICS & GYNECOLOGY

## 2024-05-09 PROCEDURE — 59025 FETAL NON-STRESS TEST: CPT | Mod: 26,,, | Performed by: OBSTETRICS & GYNECOLOGY

## 2024-05-09 PROCEDURE — 86762 RUBELLA ANTIBODY: CPT

## 2024-05-09 PROCEDURE — 87591 N.GONORRHOEAE DNA AMP PROB: CPT

## 2024-05-09 PROCEDURE — 63600175 PHARM REV CODE 636 W HCPCS

## 2024-05-09 RX ORDER — SIMETHICONE 80 MG
1 TABLET,CHEWABLE ORAL EVERY 6 HOURS PRN
Status: DISCONTINUED | OUTPATIENT
Start: 2024-05-10 | End: 2024-05-11 | Stop reason: HOSPADM

## 2024-05-09 RX ORDER — AMOXICILLIN 250 MG
1 CAPSULE ORAL NIGHTLY PRN
Status: DISCONTINUED | OUTPATIENT
Start: 2024-05-10 | End: 2024-05-11 | Stop reason: HOSPADM

## 2024-05-09 RX ORDER — PRENATAL WITH FERROUS FUM AND FOLIC ACID 3080; 920; 120; 400; 22; 1.84; 3; 20; 10; 1; 12; 200; 27; 25; 2 [IU]/1; [IU]/1; MG/1; [IU]/1; MG/1; MG/1; MG/1; MG/1; MG/1; MG/1; UG/1; MG/1; MG/1; MG/1; MG/1
1 TABLET ORAL DAILY
Status: DISCONTINUED | OUTPATIENT
Start: 2024-05-10 | End: 2024-05-11 | Stop reason: HOSPADM

## 2024-05-09 RX ORDER — ONDANSETRON 8 MG/1
8 TABLET, ORALLY DISINTEGRATING ORAL EVERY 8 HOURS PRN
Status: DISCONTINUED | OUTPATIENT
Start: 2024-05-10 | End: 2024-05-11 | Stop reason: HOSPADM

## 2024-05-09 RX ORDER — LANOLIN ALCOHOL/MO/W.PET/CERES
1 CREAM (GRAM) TOPICAL DAILY
Status: DISCONTINUED | OUTPATIENT
Start: 2024-05-10 | End: 2024-05-11 | Stop reason: HOSPADM

## 2024-05-09 RX ORDER — ACETAMINOPHEN 325 MG/1
650 TABLET ORAL EVERY 6 HOURS PRN
Status: DISCONTINUED | OUTPATIENT
Start: 2024-05-10 | End: 2024-05-11 | Stop reason: HOSPADM

## 2024-05-09 RX ORDER — SODIUM CHLORIDE 0.9 % (FLUSH) 0.9 %
10 SYRINGE (ML) INJECTION
Status: DISCONTINUED | OUTPATIENT
Start: 2024-05-10 | End: 2024-05-11 | Stop reason: HOSPADM

## 2024-05-09 RX ORDER — DIPHENHYDRAMINE HYDROCHLORIDE 50 MG/ML
25 INJECTION INTRAMUSCULAR; INTRAVENOUS EVERY 4 HOURS PRN
Status: DISCONTINUED | OUTPATIENT
Start: 2024-05-10 | End: 2024-05-11 | Stop reason: HOSPADM

## 2024-05-09 RX ORDER — DIPHENHYDRAMINE HCL 25 MG
25 CAPSULE ORAL EVERY 4 HOURS PRN
Status: DISCONTINUED | OUTPATIENT
Start: 2024-05-10 | End: 2024-05-11 | Stop reason: HOSPADM

## 2024-05-09 RX ORDER — BETAMETHASONE SODIUM PHOSPHATE AND BETAMETHASONE ACETATE 3; 3 MG/ML; MG/ML
12 INJECTION, SUSPENSION INTRA-ARTICULAR; INTRALESIONAL; INTRAMUSCULAR; SOFT TISSUE
Status: COMPLETED | OUTPATIENT
Start: 2024-05-09 | End: 2024-05-11

## 2024-05-09 RX ADMIN — BETAMETHASONE SODIUM PHOSPHATE AND BETAMETHASONE ACETATE 12 MG: 3; 3 INJECTION, SUSPENSION INTRA-ARTICULAR; INTRALESIONAL; INTRAMUSCULAR at 11:05

## 2024-05-10 PROBLEM — D68.00 VON WILLEBRAND DISEASE: Status: ACTIVE | Noted: 2024-05-10

## 2024-05-10 PROBLEM — Z86.19 HISTORY OF CHLAMYDIA: Status: ACTIVE | Noted: 2024-05-10

## 2024-05-10 LAB
ABO + RH BLD: NORMAL
BASOPHILS # BLD AUTO: 0.04 K/UL (ref 0–0.2)
BASOPHILS NFR BLD: 0.3 % (ref 0–1.9)
BLD GP AB SCN CELLS X3 SERPL QL: NORMAL
DIFFERENTIAL METHOD BLD: ABNORMAL
EOSINOPHIL # BLD AUTO: 0.2 K/UL (ref 0–0.5)
EOSINOPHIL NFR BLD: 1.1 % (ref 0–8)
ERYTHROCYTE [DISTWIDTH] IN BLOOD BY AUTOMATED COUNT: 12.6 % (ref 11.5–14.5)
HBV SURFACE AG SERPL QL IA: NORMAL
HCT VFR BLD AUTO: 39.2 % (ref 37–48.5)
HGB BLD-MCNC: 13.2 G/DL (ref 12–16)
HIV 1+2 AB+HIV1 P24 AG SERPL QL IA: NEGATIVE
IMM GRANULOCYTES # BLD AUTO: 0.3 K/UL (ref 0–0.04)
IMM GRANULOCYTES NFR BLD AUTO: 2.3 % (ref 0–0.5)
LYMPHOCYTES # BLD AUTO: 2.4 K/UL (ref 1–4.8)
LYMPHOCYTES NFR BLD: 17.9 % (ref 18–48)
MCH RBC QN AUTO: 30.2 PG (ref 27–31)
MCHC RBC AUTO-ENTMCNC: 33.7 G/DL (ref 32–36)
MCV RBC AUTO: 90 FL (ref 82–98)
MONOCYTES # BLD AUTO: 1 K/UL (ref 0.3–1)
MONOCYTES NFR BLD: 7.5 % (ref 4–15)
NEUTROPHILS # BLD AUTO: 9.4 K/UL (ref 1.8–7.7)
NEUTROPHILS NFR BLD: 70.9 % (ref 38–73)
NRBC BLD-RTO: 0 /100 WBC
PLATELET # BLD AUTO: 347 K/UL (ref 150–450)
PMV BLD AUTO: 8.9 FL (ref 9.2–12.9)
RBC # BLD AUTO: 4.37 M/UL (ref 4–5.4)
SPECIMEN OUTDATE: NORMAL
TREPONEMA PALLIDUM IGG+IGM AB [PRESENCE] IN SERUM OR PLASMA BY IMMUNOASSAY: NONREACTIVE
WBC # BLD AUTO: 13.23 K/UL (ref 3.9–12.7)

## 2024-05-10 PROCEDURE — 25000003 PHARM REV CODE 250

## 2024-05-10 PROCEDURE — 87081 CULTURE SCREEN ONLY: CPT

## 2024-05-10 PROCEDURE — 36415 COLL VENOUS BLD VENIPUNCTURE: CPT | Performed by: OBSTETRICS & GYNECOLOGY

## 2024-05-10 PROCEDURE — 63600175 PHARM REV CODE 636 W HCPCS

## 2024-05-10 PROCEDURE — G0378 HOSPITAL OBSERVATION PER HR: HCPCS

## 2024-05-10 RX ADMIN — FERROUS SULFATE TAB 325 MG (65 MG ELEMENTAL FE) 1 EACH: 325 (65 FE) TAB at 09:05

## 2024-05-10 RX ADMIN — SODIUM CHLORIDE, POTASSIUM CHLORIDE, SODIUM LACTATE AND CALCIUM CHLORIDE 1000 ML: 600; 310; 30; 20 INJECTION, SOLUTION INTRAVENOUS at 12:05

## 2024-05-10 RX ADMIN — PRENATAL VIT W/ FE FUMARATE-FA TAB 27-0.8 MG 1 TABLET: 27-0.8 TAB at 09:05

## 2024-05-10 NOTE — ED PROVIDER NOTES
Encounter Date: 2024       History     Chief Complaint   Patient presents with    Vaginal Bleeding     Rosa Alvarez is a 23 y.o. F at 29w1d presents complaining of vaginal bleeding. She reports she had intercourse around 1700 and noticed pink tinged discharge when wiping afterwards. She states she was not bleeding heavy afterwards and did nor have to wear a pad or panty liner. She denies abdominal pain & cramping.     This IUP is complicated by Von Willebrand's & short cervix.  Patient denies contractions, reports vaginal bleeding, denies LOF.   Fetal Movement: normal     The history is provided by the patient and medical records. No  was used.     Review of patient's allergies indicates:  No Known Allergies  Past Medical History:   Diagnosis Date    Hereditary factor VIII deficiency     Von Willebrand disease      No past surgical history on file.  No family history on file.  Social History     Tobacco Use    Smoking status: Former     Current packs/day: 0.00     Types: Cigarettes, Vaping with nicotine     Quit date: 2023     Years since quittin.4    Smokeless tobacco: Never   Substance Use Topics    Alcohol use: Never    Drug use: Never     Review of Systems   Constitutional:  Negative for chills, fatigue and fever.   HENT:  Negative for congestion.    Eyes:  Negative for visual disturbance.   Respiratory:  Negative for shortness of breath.    Cardiovascular:  Negative for chest pain and leg swelling.   Gastrointestinal:  Negative for constipation, diarrhea, nausea and vomiting.   Genitourinary:  Positive for vaginal bleeding. Negative for dysuria.   Musculoskeletal:  Negative for arthralgias and joint swelling.   Skin:  Negative for rash.   Neurological:  Negative for weakness and headaches.   Psychiatric/Behavioral:  Negative for confusion and sleep disturbance.        Physical Exam     Initial Vitals   BP Pulse Resp Temp SpO2   05/10/24 0008 24 2317 24 2317  05/09/24 2317 05/09/24 2317   124/77 96 16 98.7 °F (37.1 °C) 97 %      MAP       --              Temp:  [98.7 °F (37.1 °C)] 98.7 °F (37.1 °C)  Pulse:  [] 97  Resp:  [16] 16  SpO2:  [92 %-99 %] 92 %  BP: (124)/(77-79) 124/77    Physical Exam    Vitals reviewed.  Constitutional: She appears well-developed and well-nourished.   HENT:   Head: Normocephalic.   Eyes: EOM are normal.   Neck:   Normal range of motion.  Cardiovascular:  Normal rate.           Pulmonary/Chest: No respiratory distress.   Abdominal:   Gravid Abdomen There is no rebound and no guarding.   Musculoskeletal:         General: Normal range of motion.      Cervical back: Normal range of motion.     Neurological: She is alert and oriented to person, place, and time.   Skin: Skin is warm and dry.   Psychiatric: She has a normal mood and affect.     OB LABOR EXAM:     Membranes ruptured: No.   Method: Sterile speculum exam per MD.   Vaginal Bleeding: other (see comments) and spotting.     Dilatation: 3.       Amniotic Fluid Color: no fluid.     Comments: SSE: blood tinged mucus in front of cervix wiped away with proctoswab. Cervix visually 3cm dilated with membranes seen.   SVE not performed due to membranes visualized        ED Course   Obtain Fetal nonstress test (NST)    Date/Time: 5/10/2024 12:33 AM    Performed by: Magdalena Cavanaugh MD  Authorized by: Magdalena Cavanaugh MD    Nonstress Test:     Variability:  6-25 BPM    Decelerations:  None    Accelerations:  10 bpm    Baseline:  135    Uterine Irritability: No      Contractions:  Irregular  Biophysical Profile:     Nonstress Test Interpretation: appropriate for gestational age      Overall Impression:  Reassuring  Post-procedure:     Patient tolerance:  Patient tolerated the procedure well with no immediate complications    Labs Reviewed   STREP B SCREEN, VAGINAL / RECTAL   HEPATITIS B SURFACE ANTIGEN   RUBELLA ANTIBODY, IGG   VON WILLEBRAND FACTOR ACTIVITY, PLASMA   C. TRACHOMATIS/N. GONORRHOEAE  BY AMP DNA   VAGINOSIS SCREEN BY DNA PROBE          Imaging Results    None          Medications   betamethasone acetate-betamethasone sodium phosphate injection 12 mg (12 mg Intramuscular Given 24 3526)   ferrous sulfate tablet 1 each (has no administration in time range)   sodium chloride 0.9% flush 10 mL (has no administration in time range)   acetaminophen tablet 650 mg (has no administration in time range)   ondansetron disintegrating tablet 8 mg (has no administration in time range)   senna-docusate 8.6-50 mg per tablet 1 tablet (has no administration in time range)   simethicone chewable tablet 80 mg (has no administration in time range)   diphenhydrAMINE capsule 25 mg (has no administration in time range)   diphenhydrAMINE injection 25 mg (has no administration in time range)   prenatal vitamin oral tablet (has no administration in time range)   lactated ringers bolus 1,000 mL (1,000 mLs Intravenous New Bag 5/10/24 4316)     Medical Decision Making  Rosa Alvarez is a 23 y.o. F at 29w2d presents complaining of vaginal bleeding.    - VSS & WNL  - PE as above  - NST: 135 bpm, mod BTBV, + accels, - decels, R&R  - TOCO: irregular ctx   - SSE: blood tinged mucus in front of cervix wiped away with proctoswab. Cervix visually 3cm dilated with membranes seen. GCCT & vaginosis collected. GBS collected.   - SVE not performed due to membranes visualized   - BSUS: active fetus, breech presentation, anterior placenta, MVP 9 cm  - Admit to Antepartum unit for continued monitoring and BMZ administration given cervix 3cm dilated at 29 wga  - Consents for delivery including vaginal or  section and blood transfusion signed and to chart  - Risks, benefits, alternatives and possible complications have been discussed in detail with the patient.   - Epidural per Anesthesia  - CBC, T&S, Hep B, Rubella, VWF activity, HIV, and Trep drawn   - Staff notified  - Regular diet  - continuous monitoring                Attending Attestation:   Physician Attestation Statement for Resident:  As the supervising MD   Physician Attestation Statement: I have personally seen and examined this patient.   I agree with the above history.  -:   As the supervising MD I agree with the above PE.     As the supervising MD I agree with the above treatment, course, plan, and disposition.   -: I agree with the above edited resident note. Pt seen and examined, chart and labs reviewed.    Briefly, 22 yo G1 at 29w1d presenting for spotting after intercourse in the setting of known Von Willebrand disease. Afebrile, VSS. NST Cat I appropriate for GA, [place] initially quiet but after repositioning several contractions were recorded. Pt denies feeling ctx in POLLY. SSE performed and pink tinged mucous wiped away from cervix with proctoswab, at which point cervix was visualized as 3cm dilated with membranes visible and intact. No active bleeding, no pooling of fluid, no significant discharge seen throughout vagina. SVE deferred given exposed membranes. Decision made to admit for BMTZ and observation. BSS demonstrates breech presentation with normal fluid.     All questions answered. Admit to [facility] for further mgmt.     Ophelia Root MD  OB Hospitalist  5/10/2024     I was personally present during the critical portions of the procedure(s) performed by the resident and was immediately available in the ED to provide services and assistance as needed during the entire procedure.  I have reviewed and agree with the residents interpretation of the following: lab data.  I have reviewed the following: old records at this facility.                ED Course as of 05/10/24 0410   Thu May 09, 2024   2359 Temp: 98.7 °F (37.1 °C) [AW]   2359 Temp Source: Oral [AW]   2359 Pulse: 96 [AW]   2359 Resp: 16 [AW]   2359 SpO2: 97 % [AW]      ED Course User Index  [AW] Magdalena Cavanaugh MD                           Clinical Impression:  Final diagnoses:  [O46.93] Vaginal bleeding  in pregnancy, third trimester (Primary)  [D68.00] Von Willebrand disease  [Z3A.29] 29 weeks gestation of pregnancy          ED Disposition Condition    Send to L&D                 Ophelia Root MD  05/10/24 0414

## 2024-05-10 NOTE — PROGRESS NOTES
Maternal Fetal Medicine  Progress Note          Subjective:    Rosa Alvarez is a 23 y.o.  at 29w2d admitted for premature cervical dilation. Please see H&P for details regarding presentation at admission. This pregnancy is complicated by Short cervix (6mm, declined cerclage), von Willebrand Disease, h/o  chlamydia (1T, neg DOUGLAS) .    Interim HPI: Denies abdominal pain or cramping today. Denies vaginal bleeding or leakage of fluids. Reports good fetal movement.     Medical, Surgical, Social, Family, and Obstetric History: reviewed in chart  Current Medications:    betamethasone acetate-betamethasone sodium phosphate  12 mg Intramuscular Q24H    ferrous sulfate  1 tablet Oral Daily    prenatal vitamin  1 tablet Oral Daily      Current Facility-Administered Medications:     acetaminophen, 650 mg, Oral, Q6H PRN    diphenhydrAMINE, 25 mg, Oral, Q4H PRN    diphenhydrAMINE, 25 mg, Intravenous, Q4H PRN    ondansetron, 8 mg, Oral, Q8H PRN    senna-docusate 8.6-50 mg, 1 tablet, Oral, Nightly PRN    simethicone, 1 tablet, Oral, Q6H PRN    sodium chloride 0.9%, 10 mL, Intravenous, PRN   Objective:   BP (!) 111/57   Pulse 96   Temp 98.7 °F (37.1 °C) (Oral)   Resp 16   LMP 10/12/2023   SpO2 (!) 92%     Focused Physical Examination   General: well developed, no acute distress  Pulmonary: respiratory effort normal with no retractions  Abdomen: gravid  Cervix: deferred    Fetal Monitoring  EFM: 120 baseline/moderate variability/+ accels/+ occasional variable decel; overall reactive and reassuring for gestational age  Tocometer: quiet    Lab Results  Lab Results   Component Value Date    WBC 13.23 (H) 2024    HGB 13.2 2024    HCT 39.2 2024    MCV 90 2024     2024       vWF pending    Assessment:   23 y.o.  at 29w2d admitted for premature cervical dilation   Plan:   Premature cervical dilation  - Presented to OSH for vaginal spotting. Patient w/ known short cervix (CL 6mm) + declinign  cerclage. SSE on admission notable for cervix that was visually dilated 3cm with membranes.   - Continue steroid course  - Continuous monitoring/tocometry. No contractions seen on toco overnight.      Short cervix  - 6mm cervical length noted on 4/15/24, declined cerclage  - patient has been taking vaginal progesterone qHS, will hold on admit given exposed membranes     Von Willebrand Disease  - follows closely by MFM (Dr. Yates), Dr. Young (her hematologist), and Dr. Magaña ([facility])  - presumed Type 2M vWD   - vW factor activity 84% on 4/8/24, repeat obtained on admit  - plan for delivery at [facility], please see note from Dr. Magaña on 4/24 for full recommendations:   - notify Hematology Oncology and blood bank when patient is admitted for delivery  - due to the delayed decrease in vWF after delivery(can take a few hours to occur),there is no need for a loading dose of Humate P.  - postpartum: Humate-P 50 units/kilogram following delivery then 25 units/kilogram every 12-24 hours for 3-5 days. Tranexamic acid 25 milligrams/kilogram orally every 6-8 hours for 5 days.     Chlamydia  - positive at initial OB apt   - DOUGLAS neg  - Repeat testing pending      Renea Lara MD PGY-2  Obstetrics and Gynecology  [facility]

## 2024-05-10 NOTE — PLAN OF CARE
Problem:  Fall Injury Risk  Goal: Absence of Fall, Infant Drop and Related Injury  Outcome: Progressing     Problem: Adult Inpatient Plan of Care  Goal: Plan of Care Review  Outcome: Progressing  Goal: Patient-Specific Goal (Individualized)  Outcome: Progressing  Goal: Absence of Hospital-Acquired Illness or Injury  Outcome: Progressing  Goal: Optimal Comfort and Wellbeing  Outcome: Progressing  Goal: Readiness for Transition of Care  Outcome: Progressing     Problem:  Labor  Goal: Delayed  Delivery  Outcome: Progressing

## 2024-05-10 NOTE — H&P
[facility] Emergency Dept (Obstetrics)  Obstetrics  History & Physical    Patient Name: Rosa Alvarez  MRN: [id number]  Admission Date: 2024  [facility]    Subjective:     Principal Problem:Premature cervical dilation in third trimester    History of Present Illness:   Rosa Alvarez is a 23 y.o. F at 29w1d presents as a transfer from OSH for further evaluation of VB and premature cervical dilation (3cm). Patients chief compliant is vaginal bleeding. She reports she had intercourse around 1700 and noticed pink tinged discharge when wiping afterwards. She states she was not bleeding heavy afterwards and did nor have to wear a pad or panty liner. She denies any abdominal/pelvic pain/cramping. Denies LOF. Normal FM.     This IUP is complicated by Von Willebrand Disease, short cervix, h/o chlamydia (1T, neg DOUGLAS).     OB History    Para Term  AB Living   1 0 0 0 0 0   SAB IAB Ectopic Multiple Live Births   0 0 0 0 0      # Outcome Date GA Lbr Graham/2nd Weight Sex Type Anes PTL Lv   1 Current              Past Medical History:   Diagnosis Date    Hereditary factor VIII deficiency     Von Willebrand disease      No past surgical history on file.    PTA Medications   Medication Sig    ferrous sulfate 325 (65 FE) MG EC tablet Take 1 tablet (325 mg total) by mouth 4 (four) times a week.    prenatal vit/iron fum/folic ac (PRENATAL 1+1 ORAL) Take by mouth.    progesterone (PROMETRIUM) 200 MG capsule Place 1 capsule (200 mg total) vaginally nightly.       Review of patient's allergies indicates:  No Known Allergies     Family History    None       Tobacco Use    Smoking status: Former     Current packs/day: 0.00     Types: Cigarettes, Vaping with nicotine     Quit date: 2023     Years since quittin.4    Smokeless tobacco: Never   Substance and Sexual Activity    Alcohol use: Never    Drug use: Never    Sexual activity: Yes     Partners: Male     Review of  Systems   Constitutional:  Negative for chills, fatigue and fever.   HENT:  Negative for congestion.    Eyes:  Negative for visual disturbance.   Respiratory:  Negative for shortness of breath.    Cardiovascular:  Negative for chest pain and leg swelling.   Gastrointestinal:  Negative for constipation, diarrhea, nausea and vomiting.   Genitourinary:  Positive for vaginal bleeding. Negative for dysuria.   Musculoskeletal:  Negative for arthralgias and joint swelling.   Skin:  Negative for rash.   Neurological:  Negative for weakness and headaches.   Psychiatric/Behavioral:  Negative for confusion and sleep disturbance.      Objective:     Vital Signs (Most Recent):  Temp: 98.7 °F (37.1 °C) (05/09/24 2317)  Pulse: 96 (05/09/24 2317)  Resp: 16 (05/09/24 2317)  SpO2: 97 % (05/09/24 2317) Vital Signs (24h Range):  Temp:  [98.7 °F (37.1 °C)] 98.7 °F (37.1 °C)  Pulse:  [] 96  Resp:  [16] 16  SpO2:  [92 %-99 %] 97 %  BP: (124)/(79) 124/79        There is no height or weight on file to calculate BMI.    FHT: 135bpm, mod jaquelin, + accels, - decels; appropriate for gestational age  TOCO:  Irregular    Physical Exam  Constitutional: She appears well-developed and well-nourished.   HENT:   Head: Normocephalic.   Eyes: EOM are normal.   Neck:   Normal range of motion.  Cardiovascular:  Normal rate.           Pulmonary/Chest: No respiratory distress.   Abdominal:   Gravid Abdomen There is no rebound and no guarding.   SSE: (performed with Dr. Root) blood tinged mucus in front of cervix wiped away with proctoswab. Cervix visually 3cm dilated with membranes seen.   SVE not performed due to membranes visualized  Musculoskeletal:         General: Normal range of motion.      Cervical back: Normal range of motion.      Neurological: She is alert and oriented to person, place, and time.   Skin: Skin is warm and dry.   Psychiatric: She has a normal mood and affect.       Significant Labs:  Lab Results   Component Value Date     GROUPTRH B POS 2023         Assessment/Plan:     23 y.o. female  at 29w1d for:    Active Diagnoses:    Diagnosis Date Noted POA    PRINCIPAL PROBLEM:  Premature cervical dilation in third trimester [O34.33] 2024 Yes    Short cervix during pregnancy in second trimester [O26.872] 2024 Yes    Coagulation defect affecting pregnancy, antepartum [O99.119, D68.9] 2024 Yes      Problems Resolved During this Admission:       Premature cervical dilation  - + vaginal spotting s/p intercourse, no pelvic/abdominal pain/cramping, no LOF, normal FM  - SSE: blood tinged mucus in front of cervix wiped away with proctoswab. Cervix visually 3cm dilated with membranes seen. GCCT & vaginosis collected. GBS collected. SVE not performed d/t exposed membranes.   - BSUS with breech presentation, MVP 9cm, anterior placenta  - admit to antepartum for observation and BMZ administration  - diet: regular  - monitoring: continuous    29 weeks gestation  - primary OB: Dr. Jie Montes  - delivery consents and blood consents reviewed and signed at time of admission  - presentation: breech by BSUS; will rescan if moving towards delivery  - growth US: 1091g (37%), AC 22% @ 28w0d ()  - prenatal labs: UTD  - aneuploidy screening: mat21 neg  - 1hr GTT: passed  - GBS: collected on admit   - Tdap: given 2024  - continue PNV  - contraception: Nexplanon versus IUD    Short cervix  - 6mm cervical length noted on 4/15/24, declined cerclage  - patient has been taking vaginal progesterone qHS, will hold on admit given exposed membranes    Von Willebrand Disease  - follows closely by MFM (Dr. Yates), Dr. Young (her hematologist), and Dr. Magaña ([facility])  - presumed Type 2M vWD   - vW factor activity 84% on 24, repeat obtained on admit  - plan for delivery at [facility], please see note from Dr. Magaña on  for full recommendations:   - notify Hematology Oncology and blood bank when patient is admitted for  delivery  - due to the delayed decrease in vWF after delivery(can take a few hours to occur),there is no need for a loading dose of Humate P.  - postpartum: Humate-P 50 units/kilogram following delivery then 25 units/kilogram every 12-24 hours for 3-5 days. Tranexamic acid 25 milligrams/kilogram orally every 6-8 hours for 5 days.     Chlamydia  - positive at initial OB apt   - DOUGLAS neg  - recommend repeat testing in 3T, collected on admit    Magdalena Cavanaugh MD  OB/GYN PGY1

## 2024-05-10 NOTE — PLAN OF CARE
Patient denied uterine contractions, vaginal bleeding, leakage of fluids, RUQ pain, headache, changes in vision, nausea and vomiting. Patient reported active fetal movements throughout shift.     Problem:  Labor  Goal: Delayed  Delivery  Outcome: Progressing     Problem: Hospitalized  Patient  Goal: Optimal Patient-Fetal Wellbeing  Outcome: Progressing     Problem: Adult Inpatient Plan of Care  Goal: Plan of Care Review  Outcome: Progressing     Problem:  Fall Injury Risk  Goal: Absence of Fall, Infant Drop and Related Injury  Outcome: Progressing

## 2024-05-10 NOTE — NURSING
[facility]   Labor and Delivery Triage    SUBJECTIVE:     Patient Info:  Rosa Alvarez         23 y.o.    female    2000     Chief Complaint/Reason for Admission: Light vaginal bleeding. Patient admits to recent intercourse shortly before arriving to hospital.     Triage Assessment:  Pt presents to [facility] L&D unit with c/o Vaginal bleeding.. EFM applied. Abdomen soft, nontender. +FM per pt.. Denies any pain or abdominal cramping. POC discussed, V/U.       OB History:   OB History          1    Para        Term                AB        Living             SAB        IAB        Ectopic        Multiple        Live Births                       Estimated Date of Delivery: 24     Gestational Age:  29w1d    Allergies:  Review of patient's allergies indicates:  No Known Allergies      OBJECTIVE:     Recent Vitals:  LMP 10/12/2023     Exam:  dilated to 3 cm and soft;not vertex.      Lab Results:  No results found for this or any previous visit (from the past 36 hour(s)).  Lab Results   Component Value Date    GROUPTR B POS 2023          Diagnostic Studies:    Baseline: 135 bpm, Variability: Good {> 6 bpm), and Accelerations: Reactive        COMMUNICATION WITH PROVIDER:     1950: MD contacted with above assessment.   Received order for SVE  195: SVE 3/breech    2030:  to contact attending MD for further orders. Received verbal orders to initiate transfer to [facility] [place] for a higher level of care. House supervisor contacted for transfer.  2100: Patient consented and verbalized understanding on importance of transfer.   : Report called to Hollie LAZARO at  [facility] [place]  2205 EMS on unit for transport. Patient stable at time of transfer.   Patient transferred off of unit via EMS stretcher.     Pt back from CT

## 2024-05-11 VITALS
HEART RATE: 86 BPM | OXYGEN SATURATION: 100 % | DIASTOLIC BLOOD PRESSURE: 73 MMHG | WEIGHT: 171.94 LBS | BODY MASS INDEX: 32.46 KG/M2 | SYSTOLIC BLOOD PRESSURE: 116 MMHG | TEMPERATURE: 98 F | RESPIRATION RATE: 18 BRPM | HEIGHT: 61 IN

## 2024-05-11 LAB
C TRACH DNA SPEC QL NAA+PROBE: NOT DETECTED
N GONORRHOEA DNA SPEC QL NAA+PROBE: NOT DETECTED
VWF:AC ACT/NOR PPP IA: 92 % (ref 55–200)

## 2024-05-11 PROCEDURE — 59025 FETAL NON-STRESS TEST: CPT | Mod: 26,,, | Performed by: OBSTETRICS & GYNECOLOGY

## 2024-05-11 PROCEDURE — 63600175 PHARM REV CODE 636 W HCPCS

## 2024-05-11 PROCEDURE — 25000003 PHARM REV CODE 250

## 2024-05-11 PROCEDURE — 99238 HOSP IP/OBS DSCHRG MGMT 30/<: CPT | Mod: 25,,, | Performed by: OBSTETRICS & GYNECOLOGY

## 2024-05-11 PROCEDURE — G0378 HOSPITAL OBSERVATION PER HR: HCPCS

## 2024-05-11 PROCEDURE — 96372 THER/PROPH/DIAG INJ SC/IM: CPT

## 2024-05-11 RX ADMIN — PRENATAL VIT W/ FE FUMARATE-FA TAB 27-0.8 MG 1 TABLET: 27-0.8 TAB at 08:05

## 2024-05-11 RX ADMIN — FERROUS SULFATE TAB 325 MG (65 MG ELEMENTAL FE) 1 EACH: 325 (65 FE) TAB at 08:05

## 2024-05-11 RX ADMIN — BETAMETHASONE SODIUM PHOSPHATE AND BETAMETHASONE ACETATE 12 MG: 3; 3 INJECTION, SUSPENSION INTRA-ARTICULAR; INTRALESIONAL; INTRAMUSCULAR at 12:05

## 2024-05-11 NOTE — CARE UPDATE
PM Strip Note    NST: baseline 130, mod BTBV, + accels, - decels  [place]: quiet    Continue NST twice daily. Overall reassuring for GA    Phoebe Timmons MD  OB/GYN PGY-2

## 2024-05-11 NOTE — PLAN OF CARE
Problem:  Fall Injury Risk  Goal: Absence of Fall, Infant Drop and Related Injury  Outcome: Met     Problem: Adult Inpatient Plan of Care  Goal: Plan of Care Review  Outcome: Met  Goal: Patient-Specific Goal (Individualized)  Outcome: Met  Goal: Absence of Hospital-Acquired Illness or Injury  Outcome: Met  Goal: Optimal Comfort and Wellbeing  Outcome: Met  Goal: Readiness for Transition of Care  Outcome: Met     Problem:  Labor  Goal: Delayed  Delivery  Outcome: Met     Problem: Hospitalized  Patient  Goal: Optimal Patient-Fetal Wellbeing  Outcome: Met

## 2024-05-11 NOTE — DISCHARGE SUMMARY
[facility] - Antepartum  Obstetrics & Gynecology  Discharge Summary    Patient Name: Rosa Alvarez  MRN: [id number]  Admission Date: 2024  Hospital Length of Stay: 3 days  Discharge Date and Time: 2024  9:40 AM  Attending Physician: No att. providers found   Discharging Provider: Malka Stephen MD  Primary Care Provider: [facility]    HPI: Rosa Alvarez is a 23 y.o. F at 29w1d presents as a transfer from OSH for further evaluation of VB and premature cervical dilation (3cm). Patients chief compliant is vaginal bleeding. She reports she had intercourse around 1700 and noticed pink tinged discharge when wiping afterwards. She states she was not bleeding heavy afterwards and did nor have to wear a pad or panty liner. She denies any abdominal/pelvic pain/cramping. Denies LOF. Normal FM.     This IUP is complicated by Von Willebrand Disease, short cervix, h/o chlamydia (1T, neg DOUGLAS).     Hospital Course:   2024: Patient admitted for further evaluation of vaginal bleeding and premature cervical dilation. Exam unchanged from exam performed at OHS. BMZ course started   05/10/2024:  Denies abdominal pain or cramping today. Denies vaginal bleeding or leakage of fluids. Reports good fetal movement. Continue BMZ course.   2024: NAEON. Patient denies pain or contractions today. Denies vaginal bleeding or LOF. Endorses good fetal movement. BMZ course completed. FHT was been reassuring with no contractions on toco. Patient is stable for discharge to home today.        Significant Diagnostic Studies: N/A    Pending Diagnostic Studies:       Procedure Component Value Units Date/Time    C. trachomatis/N. gonorrhoeae by AMP DNA [facility]; Cervicovaginal [[id number]] Collected: 24 2344    Order Status: Sent Lab Status: In process Updated: 05/10/24 1021    Specimen: Genital from Cervicovaginal     Rubella antibody, IgG [[id number]] Collected: 24 2355    Order Status: Sent Lab Status: In  process Updated: 05/10/24 0930    Specimen: Blood     Vaginosis Screen by DNA Probe [[id number]] Collected: 05/09/24 2344    Order Status: Sent Lab Status: In process Updated: 05/10/24 1021    Specimen: Genital from Vaginal     von Willebrand Factor Activity, Plasma [[id number]] Collected: 05/09/24 2355    Order Status: Sent Lab Status: In process Updated: 05/10/24 1204    Specimen: Blood           Final Active Diagnoses:    Diagnosis Date Noted POA    PRINCIPAL PROBLEM:  Premature cervical dilation in third trimester [O34.33] 05/09/2024 Yes    Von Willebrand disease [D68.00] 05/10/2024 Yes    History of chlamydia [Z86.19] 05/10/2024 Yes    Short cervix during pregnancy in second trimester [O26.872] 02/19/2024 Yes    Coagulation defect affecting pregnancy, antepartum [O99.119, D68.9] 02/19/2024 Yes      Problems Resolved During this Admission:        Discharged Condition: good    Disposition: Home or Self Care    Follow Up:   Follow-up Information       Jie Montes MD Follow up on 5/24/2024.    Specialty: Obstetrics and Gynecology  Why: Routine OB visit  Contact information:  [address] [address]  [phone]                           Patient Instructions:      Diet Adult Regular     Notify your health care provider if you experience any of the following:  temperature >100.4     Notify your health care provider if you experience any of the following:  persistent nausea and vomiting or diarrhea     Notify your health care provider if you experience any of the following:  severe uncontrolled pain     Notify your health care provider if you experience any of the following:  severe persistent headache     Notify your health care provider if you experience any of the following:  persistent dizziness, light-headedness, or visual disturbances     Notify your health care provider if you experience any of the following:  increased confusion or weakness     Notify your health care  provider if you experience any of the following:   Order Comments: Regular contractions, vaginal bleeding more than just spotting, leakage of fluid, or decreased fetal movement.     Activity as tolerated     Medications:  Reconciled Home Medications:      Medication List        CONTINUE taking these medications      ferrous sulfate 325 (65 FE) MG EC tablet  Take 1 tablet (325 mg total) by mouth 4 (four) times a week.     PRENATAL 1+1 ORAL  Take by mouth.     progesterone 200 MG capsule  Commonly known as: PROMETRIUM  Place 1 capsule (200 mg total) vaginally nightly.              Malka Stephen MD  Obstetrics & Gynecology  [facility] - Antepartum

## 2024-05-11 NOTE — DISCHARGE INSTRUCTIONS
Call clinic [phone] or L & D after hours at [phone] for vaginal bleeding, leakage of fluids, contractions 4-5 in one hour, decreased fetal movements ( 10 kicks in 2 hours), headache not relieved by Tylenol, blurry vision, or temp of 100.4 or greater.  Begin doing fetal kick counts, at least 10 movements in 2 hours starting at 28 weeks gestation.  Keep next clinic appointment

## 2024-05-11 NOTE — PROGRESS NOTES
"Maternal Fetal Medicine  Progress Note          Subjective:    Rosa Alvarez is a 23 y.o.  at 29w2d admitted for premature cervical dilation. Please see H&P for details regarding presentation at admission. This pregnancy is complicated by Short cervix (6mm, declined cerclage), von Willebrand Disease, h/o  chlamydia (1T, neg DOUGLAS) .    Interim HPI:   Patient denies pain or contractions today. Denies vaginal bleeding or LOF. Endorses good fetal movement. Otherwise has no complaints this AM.     Medical, Surgical, Social, Family, and Obstetric History: reviewed in chart  Current Medications:    ferrous sulfate  1 tablet Oral Daily    prenatal vitamin  1 tablet Oral Daily      Current Facility-Administered Medications:     acetaminophen, 650 mg, Oral, Q6H PRN    diphenhydrAMINE, 25 mg, Oral, Q4H PRN    diphenhydrAMINE, 25 mg, Intravenous, Q4H PRN    ondansetron, 8 mg, Oral, Q8H PRN    senna-docusate 8.6-50 mg, 1 tablet, Oral, Nightly PRN    simethicone, 1 tablet, Oral, Q6H PRN    sodium chloride 0.9%, 10 mL, Intravenous, PRN   Objective:   /66   Pulse 110   Temp 98.2 °F (36.8 °C) (Oral)   Resp 17   Ht 5' 1" (1.549 m)   Wt 78 kg (171 lb 15.3 oz)   LMP 10/12/2023   SpO2 98%   Breastfeeding No   BMI 32.49 kg/m²     Focused Physical Examination   General: well developed, no acute distress  Pulmonary: respiratory effort normal with no retractions  Abdomen: gravid  Cervix: deferred    Fetal Monitoring  EFM: 130 baseline/moderate variability/+ accels/- decels; overall reactive and reassuring for gestational age  Tocometer: quiet    Lab Results  Lab Results   Component Value Date    WBC 13.23 (H) 2024    HGB 13.2 2024    HCT 39.2 2024    MCV 90 2024     2024       vWF pending    Assessment:   23 y.o.  at 29w3d admitted for premature cervical dilation   Plan:   Premature cervical dilation  - Presented to OSH for vaginal spotting. Patient w/ known short cervix (CL 6mm), " declined cerclage at time of diagnosis. SSE on admission notable for cervix that was visually dilated 3 cm with membranes.   - S/p BMZ course  - NST BID. No contractions seen on evening toco and patient is asymptomatic this morning.      Short cervix  - 6 mm cervical length noted on 4/15/24, declined cerclage  - patient has been taking vaginal progesterone qHS, held on admit given exposed membranes, will restart today     Von Willebrand Disease  - follows closely by MFM (Dr. Yates), Dr. Young (her hematologist), and Dr. Magaña ([facility])  - presumed Type 2M vWD   - vW factor activity 84% on 4/8/24, repeat obtained on admit  - plan for delivery at [facility], please see note from Dr. Magaña on 4/24 for full recommendations:   - notify Hematology Oncology and blood bank when patient is admitted for delivery  - due to the delayed decrease in vWF after delivery(can take a few hours to occur),there is no need for a loading dose of Humate P.  - postpartum: Humate-P 50 units/kilogram following delivery then 25 units/kilogram every 12-24 hours for 3-5 days. Tranexamic acid 25 milligrams/kilogram orally every 6-8 hours for 5 days.     Chlamydia  - positive at initial OB apt   - DOUGLAS neg  - Repeat testing pending      Phoebe Timmons MD  OB/GYN PGY-2

## 2024-05-13 LAB
BACTERIA SPEC AEROBE CULT: NORMAL
BACTERIAL VAGINOSIS DNA: POSITIVE
CANDIDA GLABRATA DNA: NEGATIVE
CANDIDA KRUSEI DNA: NEGATIVE
CANDIDA RRNA VAG QL PROBE: NEGATIVE
RUBV IGG SER-ACNC: 19.2 IU/ML
RUBV IGG SER-IMP: REACTIVE
T VAGINALIS RRNA GENITAL QL PROBE: NEGATIVE

## 2024-05-15 ENCOUNTER — PATIENT MESSAGE (OUTPATIENT)
Dept: OTHER | Facility: OTHER | Age: 24
End: 2024-05-15
Payer: MEDICAID

## 2024-05-15 DIAGNOSIS — B96.89 BV (BACTERIAL VAGINOSIS): Primary | ICD-10-CM

## 2024-05-15 DIAGNOSIS — N76.0 BV (BACTERIAL VAGINOSIS): Primary | ICD-10-CM

## 2024-05-15 RX ORDER — METRONIDAZOLE 500 MG/1
500 TABLET ORAL EVERY 12 HOURS
Qty: 14 TABLET | Refills: 0 | Status: SHIPPED | OUTPATIENT
Start: 2024-05-15 | End: 2024-05-22

## 2024-05-20 ENCOUNTER — PATIENT MESSAGE (OUTPATIENT)
Dept: MATERNAL FETAL MEDICINE | Facility: CLINIC | Age: 24
End: 2024-05-20
Payer: MEDICAID

## 2024-05-24 ENCOUNTER — ROUTINE PRENATAL (OUTPATIENT)
Dept: OBSTETRICS AND GYNECOLOGY | Facility: CLINIC | Age: 24
End: 2024-05-24
Payer: MEDICAID

## 2024-05-24 VITALS
SYSTOLIC BLOOD PRESSURE: 102 MMHG | DIASTOLIC BLOOD PRESSURE: 66 MMHG | BODY MASS INDEX: 33.24 KG/M2 | HEART RATE: 106 BPM | WEIGHT: 175.94 LBS

## 2024-05-24 DIAGNOSIS — O09.90 SUPERVISION OF HIGH RISK PREGNANCY, ANTEPARTUM: Primary | ICD-10-CM

## 2024-05-24 PROCEDURE — 99213 OFFICE O/P EST LOW 20 MIN: CPT | Mod: TH,S$PBB,, | Performed by: GENERAL PRACTICE

## 2024-05-24 PROCEDURE — 99212 OFFICE O/P EST SF 10 MIN: CPT | Mod: PBBFAC,TH,PO | Performed by: GENERAL PRACTICE

## 2024-05-24 PROCEDURE — 99999 PR PBB SHADOW E&M-EST. PATIENT-LVL II: CPT | Mod: PBBFAC,,, | Performed by: GENERAL PRACTICE

## 2024-05-24 NOTE — PATIENT INSTRUCTIONS
To schedule classes (see below for what's offered) at the hospital, call [phone].    Have a question or concern?    PRO TIP: Program these phone numbers in your cell phone!    for an emergency  call 911 or go to the nearest hospital Especially after 20 weeks of the pregnancy, please remember that  Labor & Delivery is at [facility].  There is no L&D at [facility]  After hours you can only access L&D through the Emergency Room (entrance on [facility]).   [facility] Nurse Care Advice Line  [phone] At any time during your pregnancy,  you can speak to a nurse 24-7.   for non-urgent issues, send us a  message in [employer] Consider calling the Nurse Care Advice Line if it's a weekend or  toward the end of the work-day since  [employer] and phone messages may not be answered for a day or two.   for non-urgent issues, call the clinic  [phone], Option 3    Labor & Delivery  [phone] Starting at 20 weeks of the pregnancy,  you can speak to a nurse on L&D 24-7.     THIRD TRIMESTER  29+0 - 42 weeks    Adapting to Pregnancy: Third Trimester   Some physical discomforts may seem worse in the final weeks of pregnancy. Simple lifestyle changes can help as you keep good posture and use good body mechanics.  Pay attention to your changing center of gravity.  Be kind to yourself and know your limits - your body is hosting an entire other human!  Ask for help if you need it.  Take fiber supplements, drink lots of water, and avoid prolonged sitting or standing to prevent constipation and hemorrhoids.  Be sure you're getting enough rest: avoid caffeine after 3pm, use a warm bath to relax before bedtime, ask for back/neck/shoulder massages from your partner, try drinking warm decaf tea or milk at bedtime, get heartburn under control.  Speaking of heartburnavoid spicy / acidic / sugary foods, especially in the evenings.  Eat slowly and in small amounts, and avoiding eating during the 2  hours before bedtime.  Try sleeping with your upper body elevated.    Your To-Do List  If you haven't already, get these important things done!  A few new tasks include having the carseat ready, having hospital bags packed, and making arrangements if needed for other children and/or pets while you're in the hospital.  We'll ask you to pre-register at the hospital around 36 weeks so you have a little less paperwork to do when the big day arrives.    More information on these topics can be found in your OB Welcome Packet and the A-Z Book:  Choose a pediatrician for your baby.  Sign up for a tour and classes at the hospital.  All classes are free of charge if you are delivering at [facility].  Call [phone] to register for any of these classes:  Baby Love (learn about the delivery process and caring for a )  Big Brother / Big Sister Class (to help siblings prepare for baby)  Lamaze (a 4 week class to learn about natural interventions for labor)  Breastfeeding (get a head start learning about breastfeeding)  Work on some methods for coping with the pains of labor.  A good bit of labor happens before you can get an epidural, and you'll feel more confident if you have a plan that you've practiced.  We encourage everyone to breastfeed if they can (and most women can!).  Please ask us questions if you have them.  Decide what you'd like to use for contraception (birth control) after this baby is born.  If you're having a boy, let us know if you plan to have him circumcised.    Things to Look Out For  The Four Questions (please read more about these in your OB Welcome Packet): 1) Baby's Movements, 2) Contractions / Cramping, 3) Vaginal bleeding, 4) Leaking fluids  Mood swings and depression - some mood swings are expected in pregnancy, but please ask for help if you are feeling overwhelmed or sad all the time.  Headaches that don't improve with rest, drinking water, and tylenol.  Severe swelling, especially of the  face and hands. Remember some swelling of the lower legs is normal, especially if you have been on your feet for some time.    Intimacy   Unless your doctor tells you not to, it's still fine to continue having sex. The third trimester though can be a challenge comfort-wise. Try different positions and see what's best for you.    Baby!  Baby kicks and stretches despite running low on room, lanugo disappears, baby gains about a half of a pound per week, and bones harden (except for the skull, which remains soft and flexible for delivery).    OTHER INFORMATION:  If your provider orders labs or other studies, you probably won't hear from us unless something is abnormal.  How we define normal is different for many things in pregnancy.  This includes several of the numbers on a Complete Blood Count (CBC).  If your result is flagged as abnormal in [employer] but you don't hear from us, it's probably because things are actually normal based on where you are in your pregnancy.

## 2024-05-24 NOTE — PROGRESS NOTES
"  TODAY'S PROGRESS NOTE  2024    INDIANA Lee is a 23 y.o.  at 31w2d who presents for routine OB appointment.  She denies VB, LOF, CTX's.  She reports normal FM..  Had two 1-minute cramps yesterday, would rate about a 3/10.    Was admitted to [facility] - for cervical dilation @ 3cm.  Completed course of steroids for FLM (29wks).  Still taking flagyl which was Rx'ed for asymptomatic BV.    O/  VITALS:  Vitals:    24 0915   BP: 102/66   Pulse: 106       FH: 32cm  DT'S: 140bpm by doppler    GROWTH US (24 @ 28+0wks):  EFW 1091g = 37 percentile, breech presentation, (2lb 6oz)    A/P  31w2d for routine OB appointment.    The patient plans to have her son circumcised.  Next MFM appointment Holly 3 @ 33wks  Platelet aggregation study needs to be collected @ [facility] - is needed to know if will be eligible for regional anesthesia  Strict PTL precautions reviewed; FMC reviewed  RTC ~36wks EGA for JENNIFER, sooner rogelio KELLY MD   OB PROBLEM LIST:    Von Willebrand Disease, Type 2a, follows with Dr. Juan Young      [  ] deliver @ [facility], hematology recommendations below      --Anesthesia consult @ [facility] completed 5/10/24    [  ] needs platelet aggregation study to determine if candidate for regional anesthesia      [  ] notify blood bank and consult Hematology @ admission    Breech fetus @ 28wks    Short cervix (no prior PTD)    [  ] vaginal prog 200mg QHS    -- steroids given at 29wks for 3cm cervix    Chlamydia @ [facility] (DOUGLAS neg, T3 STD testing negative)   FINAL EDC:    2024 by US done 2023 @ 6-1/7 week    Baby: Boy "Thelma"  Circumcision: yes (might need vWF testing)    SO Name: Pedrito Morfin ANATOMY SCAN:    2024 @ 17+5wks: anatomy normal but suboptimal views of profile and face.    CVX 2.1 cm --> 1wk f/u  Anterior placenta no previa    24 @ 18+6wks: CVX 2.6cm --> 2wks f/u    3/11/24 @ 20+5wks: s=d, anatomy survey completed and wnl, CVX 2.0cm --> 2wks f/u      INITIAL " LABS:  DATE: 11/15/2023  MBT: B positive  AB SCREEN: negative  RPR: negative  RUBELLA: Immune  HEPATITIS A/B/C: negative  HIV: negative  CBC: 2/20/2024 - 10.5 >--- 14.2 / 39.6 ---< 399  HGB: Sickle cell negative  URINE CX: neg (3/6/24)  UDS: Negative  Other:   TSH: 1.050   10-12 WEEK LABS:    PAP: ASCUS / HPV Negative / Date: 11/20/2023    VALDO/CHLAM: chlam + / valdo - @ [facility]  --> neg x 2 (21 MAR 2024)    cfDNA ([id number]): to lab today    CARRIER SCREEN ([facility]): to lab today   28 WEEK LABS:    Date 4/22/24 @ 27wks:    CBC: 10 >--- 13 / 40 ---< 343    1HR GDM SCREEN: Negative and 112 / Ferritin 18    RPR / HIV / HEP C AB = all three neg    CT / NG = neg x 2     OTHER LABS:  GBS: ___    11/30/2023:  FVIII 74% and VWF Ag 81% - both wnl  Vwf ACTIVITY( vWF: RCO) 43% (normal range )     1/10/24:  FVIII and vWF Ag 105% and 101% - both wnl  vWF activity: 35% - low    OTHER ULTRASOUNDS:    GROWTH US (4/8/24 @ 24+5wks):  EFW 687g = 35th percentile, breech presentation, (1lb 8oz)  Cervical length = 4.5mm    US (4/15/24 @ 25+5wks):  CVX 6mm, breech    GROWTH US (5/1/24 @ 28+0wks):  EFW 1091g = 37 percentile, breech presentation, (2lb 6oz)   TO-DO THROUGHOUT PREGNANCY:    Depression Screen: 3/6/24    TDAP (27-36wks): 4/26/24    Plans to breastfeed: yes    Plans for epidural: ? With vWF ?    Classes at hospital: offered 3/21/24    Postpartum contraception: Nexplanon vs IUD    Consent for delivery: 4/9/24   MEDICATIONS:    Prenatal vitamins  Vaginal progesterone  Iron    ALLERGIES:    NKDA    MEDICAL HISTORY:    Von Willebrand's disease     Pre-pregnancy BMI = 25.5 SURGICAL HISTORY:    Negative    SOCIAL HISTORY:    Smoker: non-smoker  Alcohol: denies  Drugs: denies  Relationship: single  Domestic Violence: no  Lives with: Mother & Fathere  Education Level: Some College  Occupation: homemaker  Orthodoxy: No preference  Other:   GYN HISTORY:    PAP'S: ASCUS with Neg HPV  STI'S: recent diagnosis: chlamydia  GENITAL HSV:  no FAMILY HISTORY:    HTN: No  DIABETES: No  BLEEDING D/O: No  CLOTTING D/O: No  BIRTH DEFECTS: No  MENTAL DISABILITY: No  TWINS OR MORE: No  GYN CANCER: No  Other:     OBSTETRIC HISTORY   Month/Year Mode of Delivery EGA Wt. M/F Complications / Comments   G1          From Dr. Magaña's (Heme-Onc) note 4/24/24:  Recommendations for management of von Willebrand's disease in the postpartum:   Humate-P 50 units/kilogram following delivery then 25 units/kilogram every 12-24 hours for 3-5 days.  Tranexamic acid 25 milligrams/kilogram orally every 6-8 hours for 5 days.  Notify Hematology Oncology and blood bank when patient is admitted.

## 2024-05-28 ENCOUNTER — DOCUMENTATION ONLY (OUTPATIENT)
Dept: MATERNAL FETAL MEDICINE | Facility: CLINIC | Age: 24
End: 2024-05-28
Payer: MEDICAID

## 2024-05-28 NOTE — PROGRESS NOTES
Plan of care regarding neuraxial anesthesia discussed with OB Anesthesia.     Per Anesthesia team, if vWF <50% near/at time of delivery, will consider administration of loading dose of Humate-P in order to bring vWF activity levels to the threshold to allow for neuraxial anesthesia.     Dr. Magaña's note from 4/24 is copied below for plan of care postpartum. After discussion with Dr. Magaña, due to the delayed decrease in vWF after delivery (can take a few hours to occur), there is no need for a loading dose of Humate P (outside of potential administration for anesthesia purposes as discussed above).    Recommendations for management of von Willebrand's disease in the postpartum.     Humate-P 50 units/kilogram following delivery then 25 units/kilogram every 12-24 hours for 3-5 days.    Tranexamic acid 25 milligrams/kilogram orally every 6-8 hours for 5 days.     Notify Hematology Oncology and blood bank when patient is admitted.    Jenise Butts MD  PGY 6  Maternal Fetal Medicine  [facility]

## 2024-05-29 ENCOUNTER — PATIENT MESSAGE (OUTPATIENT)
Dept: OTHER | Facility: OTHER | Age: 24
End: 2024-05-29
Payer: MEDICAID

## 2024-05-30 ENCOUNTER — PATIENT MESSAGE (OUTPATIENT)
Dept: OBSTETRICS AND GYNECOLOGY | Facility: CLINIC | Age: 24
End: 2024-05-30
Payer: MEDICAID

## 2024-05-30 ENCOUNTER — ANESTHESIA EVENT (OUTPATIENT)
Dept: OBSTETRICS AND GYNECOLOGY | Facility: OTHER | Age: 24
End: 2024-05-30

## 2024-05-30 ENCOUNTER — ANESTHESIA (OUTPATIENT)
Dept: OBSTETRICS AND GYNECOLOGY | Facility: OTHER | Age: 24
End: 2024-05-30
Payer: MEDICAID

## 2024-05-30 ENCOUNTER — HOSPITAL ENCOUNTER (INPATIENT)
Facility: OTHER | Age: 24
LOS: 4 days | Discharge: HOME | End: 2024-06-03
Attending: OBSTETRICS & GYNECOLOGY | Admitting: STUDENT IN AN ORGANIZED HEALTH CARE EDUCATION/TRAINING PROGRAM
Payer: MEDICAID

## 2024-05-30 ENCOUNTER — ANESTHESIA (OUTPATIENT)
Dept: OBSTETRICS AND GYNECOLOGY | Facility: OTHER | Age: 24
End: 2024-05-30

## 2024-05-30 ENCOUNTER — TELEPHONE (OUTPATIENT)
Dept: OBSTETRICS AND GYNECOLOGY | Facility: OTHER | Age: 24
End: 2024-05-30
Payer: MEDICAID

## 2024-05-30 ENCOUNTER — ANESTHESIA EVENT (OUTPATIENT)
Dept: OBSTETRICS AND GYNECOLOGY | Facility: OTHER | Age: 24
End: 2024-05-30
Payer: MEDICAID

## 2024-05-30 ENCOUNTER — PATIENT MESSAGE (OUTPATIENT)
Dept: MATERNAL FETAL MEDICINE | Facility: CLINIC | Age: 24
End: 2024-05-30
Payer: MEDICAID

## 2024-05-30 ENCOUNTER — TELEPHONE (OUTPATIENT)
Dept: MATERNAL FETAL MEDICINE | Facility: CLINIC | Age: 24
End: 2024-05-30

## 2024-05-30 DIAGNOSIS — O46.90 VAGINAL BLEEDING IN PREGNANCY: Primary | ICD-10-CM

## 2024-05-30 DIAGNOSIS — D68.00 VON WILLEBRAND DISEASE: ICD-10-CM

## 2024-05-30 DIAGNOSIS — O60.00 PRETERM LABOR: ICD-10-CM

## 2024-05-30 DIAGNOSIS — Z98.891 S/P CESAREAN SECTION: ICD-10-CM

## 2024-05-30 DIAGNOSIS — Z30.017 NEXPLANON INSERTION: ICD-10-CM

## 2024-05-30 DIAGNOSIS — Z3A.32 32 WEEKS GESTATION OF PREGNANCY: ICD-10-CM

## 2024-05-30 DIAGNOSIS — D68.00 VON WILLEBRAND'S DISEASE: ICD-10-CM

## 2024-05-30 LAB
ABO + RH BLD: NORMAL
ALBUMIN SERPL BCP-MCNC: 3.2 G/DL (ref 3.5–5.2)
ALLENS TEST: ABNORMAL
ALLENS TEST: ABNORMAL
ALP SERPL-CCNC: 108 U/L (ref 55–135)
ALT SERPL W/O P-5'-P-CCNC: 19 U/L (ref 10–44)
ANION GAP SERPL CALC-SCNC: 12 MMOL/L (ref 8–16)
APTT PPP: 28.3 SEC (ref 21–32)
AST SERPL-CCNC: 20 U/L (ref 10–40)
BASOPHILS # BLD AUTO: 0.03 K/UL (ref 0–0.2)
BASOPHILS NFR BLD: 0.2 % (ref 0–1.9)
BILIRUB SERPL-MCNC: 0.3 MG/DL (ref 0.1–1)
BLD GP AB SCN CELLS X3 SERPL QL: NORMAL
BUN SERPL-MCNC: 6 MG/DL (ref 6–20)
CALCIUM SERPL-MCNC: 9.6 MG/DL (ref 8.7–10.5)
CHLORIDE SERPL-SCNC: 106 MMOL/L (ref 95–110)
CO2 SERPL-SCNC: 19 MMOL/L (ref 23–29)
CREAT SERPL-MCNC: 0.6 MG/DL (ref 0.5–1.4)
DIFFERENTIAL METHOD BLD: ABNORMAL
EOSINOPHIL # BLD AUTO: 0.1 K/UL (ref 0–0.5)
EOSINOPHIL NFR BLD: 0.9 % (ref 0–8)
ERYTHROCYTE [DISTWIDTH] IN BLOOD BY AUTOMATED COUNT: 12.9 % (ref 11.5–14.5)
EST. GFR  (NO RACE VARIABLE): >60 ML/MIN/1.73 M^2
FIBRINOGEN PPP-MCNC: 477 MG/DL (ref 182–400)
GLUCOSE SERPL-MCNC: 105 MG/DL (ref 70–110)
HCO3 UR-SCNC: 27.3 MMOL/L (ref 12–29)
HCO3 UR-SCNC: 29.6 MMOL/L (ref 17–27)
HCT VFR BLD AUTO: 39.2 % (ref 37–48.5)
HCT VFR BLD CALC: 41 %PCV (ref 36–54)
HCT VFR BLD CALC: 41 %PCV (ref 36–54)
HGB BLD-MCNC: 13.3 G/DL (ref 12–16)
HGB BLD-MCNC: 14 G/DL
HGB BLD-MCNC: 14 G/DL
IMM GRANULOCYTES # BLD AUTO: 0.16 K/UL (ref 0–0.04)
IMM GRANULOCYTES NFR BLD AUTO: 1.1 % (ref 0–0.5)
INR PPP: 0.9 (ref 0.8–1.2)
LYMPHOCYTES # BLD AUTO: 1.9 K/UL (ref 1–4.8)
LYMPHOCYTES NFR BLD: 12.9 % (ref 18–48)
MCH RBC QN AUTO: 29.6 PG (ref 27–31)
MCHC RBC AUTO-ENTMCNC: 33.9 G/DL (ref 32–36)
MCV RBC AUTO: 87 FL (ref 82–98)
MONOCYTES # BLD AUTO: 1.1 K/UL (ref 0.3–1)
MONOCYTES NFR BLD: 7.7 % (ref 4–15)
NEUTROPHILS # BLD AUTO: 11.1 K/UL (ref 1.8–7.7)
NEUTROPHILS NFR BLD: 77.2 % (ref 38–73)
NRBC BLD-RTO: 0 /100 WBC
PCO2 BLDA: 55.7 MMHG (ref 27–49)
PCO2 BLDA: 62.4 MMHG (ref 32–66)
PH SMN: 7.28 [PH] (ref 7.18–7.38)
PH SMN: 7.3 [PH] (ref 7.25–7.45)
PLATELET # BLD AUTO: 305 K/UL (ref 150–450)
PMV BLD AUTO: 8.5 FL (ref 9.2–12.9)
PO2 BLDA: 16 MMHG (ref 6–31)
PO2 BLDA: 31 MMHG (ref 17–41)
POC BE: 1 MMOL/L
POC BE: 3 MMOL/L
POC SATURATED O2: 16 %
POC SATURATED O2: 52 %
POC TCO2: 29 MMOL/L (ref 23–27)
POC TCO2: 31 MMOL/L (ref 23–27)
POTASSIUM SERPL-SCNC: 3.7 MMOL/L (ref 3.5–5.1)
PROT SERPL-MCNC: 7.1 G/DL (ref 6–8.4)
PROTHROMBIN TIME: 10.2 SEC (ref 9–12.5)
RBC # BLD AUTO: 4.5 M/UL (ref 4–5.4)
SAMPLE: ABNORMAL
SAMPLE: ABNORMAL
SITE: ABNORMAL
SITE: ABNORMAL
SODIUM SERPL-SCNC: 137 MMOL/L (ref 136–145)
SPECIMEN OUTDATE: NORMAL
TREPONEMA PALLIDUM IGG+IGM AB [PRESENCE] IN SERUM OR PLASMA BY IMMUNOASSAY: NONREACTIVE
WBC # BLD AUTO: 14.33 K/UL (ref 3.9–12.7)

## 2024-05-30 PROCEDURE — 85730 THROMBOPLASTIN TIME PARTIAL: CPT | Performed by: GENERAL PRACTICE

## 2024-05-30 PROCEDURE — 36600 WITHDRAWAL OF ARTERIAL BLOOD: CPT

## 2024-05-30 PROCEDURE — 99284 EMERGENCY DEPT VISIT MOD MDM: CPT | Mod: 25,,, | Performed by: OBSTETRICS & GYNECOLOGY

## 2024-05-30 PROCEDURE — 85384 FIBRINOGEN ACTIVITY: CPT | Performed by: GENERAL PRACTICE

## 2024-05-30 PROCEDURE — 85610 PROTHROMBIN TIME: CPT | Performed by: GENERAL PRACTICE

## 2024-05-30 PROCEDURE — 25000003 PHARM REV CODE 250

## 2024-05-30 PROCEDURE — 37000009 HC ANESTHESIA EA ADD 15 MINS: Performed by: STUDENT IN AN ORGANIZED HEALTH CARE EDUCATION/TRAINING PROGRAM

## 2024-05-30 PROCEDURE — 99285 EMERGENCY DEPT VISIT HI MDM: CPT | Mod: 25

## 2024-05-30 PROCEDURE — 63600175 PHARM REV CODE 636 W HCPCS

## 2024-05-30 PROCEDURE — 36004725 HC OB OR TIME LEV III - EA ADD 15 MIN: Performed by: STUDENT IN AN ORGANIZED HEALTH CARE EDUCATION/TRAINING PROGRAM

## 2024-05-30 PROCEDURE — 27201108 HC SURGICEL

## 2024-05-30 PROCEDURE — 36004724 HC OB OR TIME LEV III - 1ST 15 MIN: Performed by: STUDENT IN AN ORGANIZED HEALTH CARE EDUCATION/TRAINING PROGRAM

## 2024-05-30 PROCEDURE — 25000003 PHARM REV CODE 250: Performed by: OBSTETRICS & GYNECOLOGY

## 2024-05-30 PROCEDURE — 51702 INSERT TEMP BLADDER CATH: CPT

## 2024-05-30 PROCEDURE — 36416 COLLJ CAPILLARY BLOOD SPEC: CPT

## 2024-05-30 PROCEDURE — 99900035 HC TECH TIME PER 15 MIN (STAT)

## 2024-05-30 PROCEDURE — 80053 COMPREHEN METABOLIC PANEL: CPT | Performed by: GENERAL PRACTICE

## 2024-05-30 PROCEDURE — 85240 CLOT FACTOR VIII AHG 1 STAGE: CPT | Performed by: STUDENT IN AN ORGANIZED HEALTH CARE EDUCATION/TRAINING PROGRAM

## 2024-05-30 PROCEDURE — 59025 FETAL NON-STRESS TEST: CPT

## 2024-05-30 PROCEDURE — 85397 CLOTTING FUNCT ACTIVITY: CPT

## 2024-05-30 PROCEDURE — 71000033 HC RECOVERY, INTIAL HOUR: Performed by: STUDENT IN AN ORGANIZED HEALTH CARE EDUCATION/TRAINING PROGRAM

## 2024-05-30 PROCEDURE — 96372 THER/PROPH/DIAG INJ SC/IM: CPT

## 2024-05-30 PROCEDURE — 59025 FETAL NON-STRESS TEST: CPT | Mod: 26,,, | Performed by: OBSTETRICS & GYNECOLOGY

## 2024-05-30 PROCEDURE — D9220A PRA ANESTHESIA: Mod: AA,,, | Performed by: ANESTHESIOLOGY

## 2024-05-30 PROCEDURE — 86593 SYPHILIS TEST NON-TREP QUANT: CPT | Performed by: GENERAL PRACTICE

## 2024-05-30 PROCEDURE — 63600175 PHARM REV CODE 636 W HCPCS: Performed by: OBSTETRICS & GYNECOLOGY

## 2024-05-30 PROCEDURE — 85025 COMPLETE CBC W/AUTO DIFF WBC: CPT | Performed by: GENERAL PRACTICE

## 2024-05-30 PROCEDURE — 86920 COMPATIBILITY TEST SPIN: CPT | Performed by: GENERAL PRACTICE

## 2024-05-30 PROCEDURE — 25000003 PHARM REV CODE 250: Performed by: ANESTHESIOLOGY

## 2024-05-30 PROCEDURE — 63600175 PHARM REV CODE 636 W HCPCS: Performed by: ANESTHESIOLOGY

## 2024-05-30 PROCEDURE — 37000008 HC ANESTHESIA 1ST 15 MINUTES: Performed by: STUDENT IN AN ORGANIZED HEALTH CARE EDUCATION/TRAINING PROGRAM

## 2024-05-30 PROCEDURE — 11000001 HC ACUTE MED/SURG PRIVATE ROOM

## 2024-05-30 PROCEDURE — 86901 BLOOD TYPING SEROLOGIC RH(D): CPT | Performed by: GENERAL PRACTICE

## 2024-05-30 PROCEDURE — 71000039 HC RECOVERY, EACH ADD'L HOUR: Performed by: STUDENT IN AN ORGANIZED HEALTH CARE EDUCATION/TRAINING PROGRAM

## 2024-05-30 RX ORDER — IBUPROFEN 400 MG/1
800 TABLET ORAL
Status: DISCONTINUED | OUTPATIENT
Start: 2024-05-31 | End: 2024-05-31

## 2024-05-30 RX ORDER — ONDANSETRON HYDROCHLORIDE 2 MG/ML
4 INJECTION, SOLUTION INTRAVENOUS DAILY PRN
Status: DISCONTINUED | OUTPATIENT
Start: 2024-05-30 | End: 2024-05-31

## 2024-05-30 RX ORDER — DIPHENHYDRAMINE HYDROCHLORIDE 50 MG/ML
12.5 INJECTION INTRAMUSCULAR; INTRAVENOUS
Status: DISCONTINUED | OUTPATIENT
Start: 2024-05-30 | End: 2024-06-03 | Stop reason: HOSPADM

## 2024-05-30 RX ORDER — HYDROCODONE BITARTRATE AND ACETAMINOPHEN 500; 5 MG/1; MG/1
TABLET ORAL
Status: DISCONTINUED | OUTPATIENT
Start: 2024-05-30 | End: 2024-06-03 | Stop reason: HOSPADM

## 2024-05-30 RX ORDER — OXYTOCIN/RINGER'S LACTATE 30/500 ML
95 PLASTIC BAG, INJECTION (ML) INTRAVENOUS CONTINUOUS
Status: DISCONTINUED | OUTPATIENT
Start: 2024-05-30 | End: 2024-06-03

## 2024-05-30 RX ORDER — ONDANSETRON HYDROCHLORIDE 2 MG/ML
INJECTION, SOLUTION INTRAVENOUS
Status: DISCONTINUED | OUTPATIENT
Start: 2024-05-30 | End: 2024-05-30

## 2024-05-30 RX ORDER — ROCURONIUM BROMIDE 10 MG/ML
INJECTION, SOLUTION INTRAVENOUS
Status: DISCONTINUED | OUTPATIENT
Start: 2024-05-30 | End: 2024-05-30

## 2024-05-30 RX ORDER — SUCCINYLCHOLINE CHLORIDE 20 MG/ML
INJECTION INTRAMUSCULAR; INTRAVENOUS
Status: DISCONTINUED | OUTPATIENT
Start: 2024-05-30 | End: 2024-05-30

## 2024-05-30 RX ORDER — SODIUM CITRATE AND CITRIC ACID MONOHYDRATE 334; 500 MG/5ML; MG/5ML
SOLUTION ORAL
Status: COMPLETED
Start: 2024-05-30 | End: 2024-05-30

## 2024-05-30 RX ORDER — TRANEXAMIC ACID 10 MG/ML
1000 INJECTION, SOLUTION INTRAVENOUS EVERY 30 MIN PRN
Status: DISCONTINUED | OUTPATIENT
Start: 2024-05-30 | End: 2024-06-03 | Stop reason: HOSPADM

## 2024-05-30 RX ORDER — AZITHROMYCIN 100 MG/ML
INJECTION, POWDER, LYOPHILIZED, FOR SOLUTION INTRAVENOUS
Status: DISPENSED
Start: 2024-05-30 | End: 2024-05-31

## 2024-05-30 RX ORDER — ACETAMINOPHEN 10 MG/ML
INJECTION, SOLUTION INTRAVENOUS
Status: DISCONTINUED | OUTPATIENT
Start: 2024-05-30 | End: 2024-05-30

## 2024-05-30 RX ORDER — KETOROLAC TROMETHAMINE 30 MG/ML
30 INJECTION, SOLUTION INTRAMUSCULAR; INTRAVENOUS
Status: DISCONTINUED | OUTPATIENT
Start: 2024-05-30 | End: 2024-05-31

## 2024-05-30 RX ORDER — MISOPROSTOL 200 UG/1
800 TABLET ORAL ONCE AS NEEDED
Status: DISCONTINUED | OUTPATIENT
Start: 2024-05-30 | End: 2024-06-03 | Stop reason: HOSPADM

## 2024-05-30 RX ORDER — KETAMINE HCL IN 0.9 % NACL 50 MG/5 ML
SYRINGE (ML) INTRAVENOUS
Status: DISCONTINUED | OUTPATIENT
Start: 2024-05-30 | End: 2024-05-30

## 2024-05-30 RX ORDER — MIDAZOLAM HYDROCHLORIDE 5 MG/ML
INJECTION INTRAMUSCULAR; INTRAVENOUS
Status: DISCONTINUED | OUTPATIENT
Start: 2024-05-30 | End: 2024-05-30

## 2024-05-30 RX ORDER — SODIUM CHLORIDE 0.9 % (FLUSH) 0.9 %
10 SYRINGE (ML) INJECTION
Status: DISCONTINUED | OUTPATIENT
Start: 2024-05-31 | End: 2024-06-03 | Stop reason: HOSPADM

## 2024-05-30 RX ORDER — CEFAZOLIN SODIUM 1 G/3ML
INJECTION, POWDER, FOR SOLUTION INTRAMUSCULAR; INTRAVENOUS
Status: DISCONTINUED | OUTPATIENT
Start: 2024-05-30 | End: 2024-05-30

## 2024-05-30 RX ORDER — PROCHLORPERAZINE EDISYLATE 5 MG/ML
5 INJECTION INTRAMUSCULAR; INTRAVENOUS EVERY 6 HOURS PRN
Status: DISCONTINUED | OUTPATIENT
Start: 2024-05-30 | End: 2024-06-03 | Stop reason: HOSPADM

## 2024-05-30 RX ORDER — ONDANSETRON HYDROCHLORIDE 2 MG/ML
4 INJECTION, SOLUTION INTRAVENOUS EVERY 6 HOURS PRN
Status: DISCONTINUED | OUTPATIENT
Start: 2024-05-30 | End: 2024-06-03 | Stop reason: HOSPADM

## 2024-05-30 RX ORDER — TRANEXAMIC ACID 100 MG/ML
INJECTION, SOLUTION INTRAVENOUS
Status: DISCONTINUED | OUTPATIENT
Start: 2024-05-30 | End: 2024-05-30

## 2024-05-30 RX ORDER — METHYLERGONOVINE MALEATE 0.2 MG/ML
200 INJECTION INTRAVENOUS ONCE AS NEEDED
Status: DISCONTINUED | OUTPATIENT
Start: 2024-05-30 | End: 2024-06-03 | Stop reason: HOSPADM

## 2024-05-30 RX ORDER — SODIUM CHLORIDE, SODIUM LACTATE, POTASSIUM CHLORIDE, CALCIUM CHLORIDE 600; 310; 30; 20 MG/100ML; MG/100ML; MG/100ML; MG/100ML
INJECTION, SOLUTION INTRAVENOUS CONTINUOUS
Status: DISCONTINUED | OUTPATIENT
Start: 2024-05-30 | End: 2024-06-03

## 2024-05-30 RX ORDER — BETAMETHASONE SODIUM PHOSPHATE AND BETAMETHASONE ACETATE 3; 3 MG/ML; MG/ML
12 INJECTION, SUSPENSION INTRA-ARTICULAR; INTRALESIONAL; INTRAMUSCULAR; SOFT TISSUE
Status: COMPLETED | OUTPATIENT
Start: 2024-05-30 | End: 2024-05-31

## 2024-05-30 RX ORDER — FAMOTIDINE 10 MG/ML
20 INJECTION INTRAVENOUS
Status: COMPLETED | OUTPATIENT
Start: 2024-05-30 | End: 2024-05-30

## 2024-05-30 RX ORDER — PROPOFOL 10 MG/ML
VIAL (ML) INTRAVENOUS
Status: DISCONTINUED | OUTPATIENT
Start: 2024-05-30 | End: 2024-05-30

## 2024-05-30 RX ORDER — HYDROMORPHONE HYDROCHLORIDE 2 MG/ML
0.2 INJECTION, SOLUTION INTRAMUSCULAR; INTRAVENOUS; SUBCUTANEOUS EVERY 5 MIN PRN
Status: DISCONTINUED | OUTPATIENT
Start: 2024-05-30 | End: 2024-05-31

## 2024-05-30 RX ORDER — ONDANSETRON 8 MG/1
8 TABLET, ORALLY DISINTEGRATING ORAL EVERY 8 HOURS PRN
Status: DISCONTINUED | OUTPATIENT
Start: 2024-05-30 | End: 2024-06-03 | Stop reason: HOSPADM

## 2024-05-30 RX ORDER — SODIUM CHLORIDE, SODIUM LACTATE, POTASSIUM CHLORIDE, CALCIUM CHLORIDE 600; 310; 30; 20 MG/100ML; MG/100ML; MG/100ML; MG/100ML
INJECTION, SOLUTION INTRAVENOUS CONTINUOUS PRN
Status: DISCONTINUED | OUTPATIENT
Start: 2024-05-30 | End: 2024-05-30

## 2024-05-30 RX ORDER — SODIUM CITRATE AND CITRIC ACID MONOHYDRATE 334; 500 MG/5ML; MG/5ML
30 SOLUTION ORAL
Status: COMPLETED | OUTPATIENT
Start: 2024-05-30 | End: 2024-05-30

## 2024-05-30 RX ORDER — OXYCODONE HYDROCHLORIDE 5 MG/1
5 TABLET ORAL
Status: DISCONTINUED | OUTPATIENT
Start: 2024-05-30 | End: 2024-05-31

## 2024-05-30 RX ORDER — LIDOCAINE HYDROCHLORIDE 20 MG/ML
INJECTION, SOLUTION EPIDURAL; INFILTRATION; INTRACAUDAL; PERINEURAL
Status: DISCONTINUED | OUTPATIENT
Start: 2024-05-30 | End: 2024-05-30

## 2024-05-30 RX ORDER — NALBUPHINE HYDROCHLORIDE 10 MG/ML
5 INJECTION, SOLUTION INTRAMUSCULAR; INTRAVENOUS; SUBCUTANEOUS ONCE AS NEEDED
Status: DISCONTINUED | OUTPATIENT
Start: 2024-05-30 | End: 2024-05-31

## 2024-05-30 RX ORDER — ACETAMINOPHEN 500 MG
1000 TABLET ORAL
Status: CANCELLED | OUTPATIENT
Start: 2024-05-30

## 2024-05-30 RX ORDER — DEXAMETHASONE SODIUM PHOSPHATE 4 MG/ML
INJECTION, SOLUTION INTRA-ARTICULAR; INTRALESIONAL; INTRAMUSCULAR; INTRAVENOUS; SOFT TISSUE
Status: DISCONTINUED | OUTPATIENT
Start: 2024-05-30 | End: 2024-05-30

## 2024-05-30 RX ORDER — ACETAMINOPHEN 325 MG/1
650 TABLET ORAL
Status: DISCONTINUED | OUTPATIENT
Start: 2024-05-31 | End: 2024-06-03 | Stop reason: HOSPADM

## 2024-05-30 RX ORDER — FENTANYL CITRATE 50 UG/ML
INJECTION, SOLUTION INTRAMUSCULAR; INTRAVENOUS
Status: DISCONTINUED | OUTPATIENT
Start: 2024-05-30 | End: 2024-05-30

## 2024-05-30 RX ORDER — METHYLERGONOVINE MALEATE 0.2 MG/ML
INJECTION INTRAVENOUS
Status: DISCONTINUED | OUTPATIENT
Start: 2024-05-30 | End: 2024-05-30

## 2024-05-30 RX ORDER — FAMOTIDINE 10 MG/ML
INJECTION INTRAVENOUS
Status: COMPLETED
Start: 2024-05-30 | End: 2024-05-30

## 2024-05-30 RX ORDER — DIPHENHYDRAMINE HCL 25 MG
25 CAPSULE ORAL EVERY 6 HOURS PRN
Status: DISCONTINUED | OUTPATIENT
Start: 2024-05-30 | End: 2024-06-03 | Stop reason: HOSPADM

## 2024-05-30 RX ORDER — CARBOPROST TROMETHAMINE 250 UG/ML
250 INJECTION, SOLUTION INTRAMUSCULAR
Status: DISCONTINUED | OUTPATIENT
Start: 2024-05-30 | End: 2024-06-03 | Stop reason: HOSPADM

## 2024-05-30 RX ORDER — OXYTOCIN 10 [USP'U]/ML
INJECTION, SOLUTION INTRAMUSCULAR; INTRAVENOUS
Status: DISCONTINUED | OUTPATIENT
Start: 2024-05-30 | End: 2024-05-30

## 2024-05-30 RX ADMIN — FENTANYL CITRATE 100 MCG: 0.05 INJECTION, SOLUTION INTRAMUSCULAR; INTRAVENOUS at 10:05

## 2024-05-30 RX ADMIN — TRANEXAMIC ACID 1000 MG: 100 INJECTION, SOLUTION INTRAVENOUS at 10:05

## 2024-05-30 RX ADMIN — MIDAZOLAM HYDROCHLORIDE 1 MG: 5 INJECTION, SOLUTION INTRAMUSCULAR; INTRAVENOUS at 10:05

## 2024-05-30 RX ADMIN — SODIUM CITRATE AND CITRIC ACID MONOHYDRATE 30 ML: 500; 334 SOLUTION ORAL at 10:05

## 2024-05-30 RX ADMIN — ACETAMINOPHEN 1000 MG: 10 INJECTION INTRAVENOUS at 10:05

## 2024-05-30 RX ADMIN — PROPOFOL 50 MG: 10 INJECTION, EMULSION INTRAVENOUS at 10:05

## 2024-05-30 RX ADMIN — SUCCINYLCHOLINE CHLORIDE 140 MG: 20 INJECTION, SOLUTION INTRAMUSCULAR; INTRAVENOUS at 10:05

## 2024-05-30 RX ADMIN — FENTANYL CITRATE 50 MCG: 0.05 INJECTION, SOLUTION INTRAMUSCULAR; INTRAVENOUS at 11:05

## 2024-05-30 RX ADMIN — OXYTOCIN 6 UNITS: 10 INJECTION, SOLUTION INTRAMUSCULAR; INTRAVENOUS at 10:05

## 2024-05-30 RX ADMIN — FAMOTIDINE 20 MG: 10 INJECTION, SOLUTION INTRAVENOUS at 10:05

## 2024-05-30 RX ADMIN — FENTANYL CITRATE 50 MCG: 0.05 INJECTION, SOLUTION INTRAMUSCULAR; INTRAVENOUS at 10:05

## 2024-05-30 RX ADMIN — ROCURONIUM BROMIDE 20 MG: 10 INJECTION, SOLUTION INTRAVENOUS at 10:05

## 2024-05-30 RX ADMIN — Medication 25 MG: at 10:05

## 2024-05-30 RX ADMIN — SODIUM CITRATE AND CITRIC ACID MONOHYDRATE 30 ML: 334; 500 SOLUTION ORAL at 10:05

## 2024-05-30 RX ADMIN — SODIUM CHLORIDE, SODIUM LACTATE, POTASSIUM CHLORIDE, AND CALCIUM CHLORIDE: 600; 310; 30; 20 INJECTION, SOLUTION INTRAVENOUS at 10:05

## 2024-05-30 RX ADMIN — CEFAZOLIN 2 G: 330 INJECTION, POWDER, FOR SOLUTION INTRAMUSCULAR; INTRAVENOUS at 10:05

## 2024-05-30 RX ADMIN — PROPOFOL 100 MG: 10 INJECTION, EMULSION INTRAVENOUS at 10:05

## 2024-05-30 RX ADMIN — METHYLERGONOVINE MALEATE 200 MCG: 0.2 INJECTION INTRAVENOUS at 11:05

## 2024-05-30 RX ADMIN — BETAMETHASONE SODIUM PHOSPHATE AND BETAMETHASONE ACETATE 12 MG: 3; 3 INJECTION, SUSPENSION INTRA-ARTICULAR; INTRALESIONAL; INTRAMUSCULAR at 09:05

## 2024-05-30 RX ADMIN — OXYTOCIN 12 UNITS: 10 INJECTION, SOLUTION INTRAMUSCULAR; INTRAVENOUS at 10:05

## 2024-05-30 RX ADMIN — SUGAMMADEX 200 MG: 100 INJECTION, SOLUTION INTRAVENOUS at 11:05

## 2024-05-30 RX ADMIN — DEXAMETHASONE SODIUM PHOSPHATE 4 MG: 4 INJECTION, SOLUTION INTRAMUSCULAR; INTRAVENOUS at 10:05

## 2024-05-30 RX ADMIN — LIDOCAINE HYDROCHLORIDE 3 ML: 20 INJECTION, SOLUTION EPIDURAL; INFILTRATION; INTRACAUDAL; PERINEURAL at 10:05

## 2024-05-30 RX ADMIN — ONDANSETRON HYDROCHLORIDE 4 MG: 2 INJECTION INTRAMUSCULAR; INTRAVENOUS at 10:05

## 2024-05-30 RX ADMIN — AZITHROMYCIN MONOHYDRATE 500 MG: 500 INJECTION, POWDER, LYOPHILIZED, FOR SOLUTION INTRAVENOUS at 10:05

## 2024-05-30 RX ADMIN — FAMOTIDINE 20 MG: 10 INJECTION INTRAVENOUS at 10:05

## 2024-05-30 NOTE — TELEPHONE ENCOUNTER
Spoke with pt in regard to message and she voiced understanding. Pt states she will go to [facility] this afternoon.

## 2024-05-30 NOTE — TELEPHONE ENCOUNTER
Pt complaining of cramping yesterday afternoon once an hour, feels like stomach tightening this morning. Noticed blood mixed with discharge this morning after using bathroom. Pt denies having intercourse. Please advise.

## 2024-05-31 LAB — FACT VIII ACT/NOR PPP: 192 % (ref 60–170)

## 2024-05-31 PROCEDURE — 63600175 PHARM REV CODE 636 W HCPCS

## 2024-05-31 PROCEDURE — 99233 SBSQ HOSP IP/OBS HIGH 50: CPT | Mod: ,,, | Performed by: OBSTETRICS & GYNECOLOGY

## 2024-05-31 PROCEDURE — 59514 CESAREAN DELIVERY ONLY: CPT | Mod: AT,,, | Performed by: STUDENT IN AN ORGANIZED HEALTH CARE EDUCATION/TRAINING PROGRAM

## 2024-05-31 PROCEDURE — 25000003 PHARM REV CODE 250

## 2024-05-31 PROCEDURE — 11000001 HC ACUTE MED/SURG PRIVATE ROOM

## 2024-05-31 PROCEDURE — 63600531 PHARM REV CODE 636 NO ALT 250 W HCPCS: Mod: JZ,JG

## 2024-05-31 PROCEDURE — 63600531 PHARM REV CODE 636 NO ALT 250 W HCPCS: Mod: JZ,JG | Performed by: GENERAL PRACTICE

## 2024-05-31 PROCEDURE — 99223 1ST HOSP IP/OBS HIGH 75: CPT | Mod: ,,, | Performed by: INTERNAL MEDICINE

## 2024-05-31 PROCEDURE — 94761 N-INVAS EAR/PLS OXIMETRY MLT: CPT

## 2024-05-31 RX ORDER — PRENATAL WITH FERROUS FUM AND FOLIC ACID 3080; 920; 120; 400; 22; 1.84; 3; 20; 10; 1; 12; 200; 27; 25; 2 [IU]/1; [IU]/1; MG/1; [IU]/1; MG/1; MG/1; MG/1; MG/1; MG/1; MG/1; UG/1; MG/1; MG/1; MG/1; MG/1
1 TABLET ORAL DAILY
Status: DISCONTINUED | OUTPATIENT
Start: 2024-05-31 | End: 2024-06-03 | Stop reason: HOSPADM

## 2024-05-31 RX ORDER — NALOXONE HCL 0.4 MG/ML
0.02 VIAL (ML) INJECTION
Status: DISCONTINUED | OUTPATIENT
Start: 2024-05-31 | End: 2024-06-03

## 2024-05-31 RX ORDER — SIMETHICONE 80 MG
1 TABLET,CHEWABLE ORAL EVERY 6 HOURS PRN
Status: DISCONTINUED | OUTPATIENT
Start: 2024-05-31 | End: 2024-06-03 | Stop reason: HOSPADM

## 2024-05-31 RX ORDER — HYDROCORTISONE 25 MG/G
CREAM TOPICAL 3 TIMES DAILY PRN
Status: DISCONTINUED | OUTPATIENT
Start: 2024-05-31 | End: 2024-06-03 | Stop reason: HOSPADM

## 2024-05-31 RX ORDER — OXYCODONE HYDROCHLORIDE 10 MG/1
10 TABLET ORAL EVERY 4 HOURS PRN
Status: DISCONTINUED | OUTPATIENT
Start: 2024-05-31 | End: 2024-06-03 | Stop reason: HOSPADM

## 2024-05-31 RX ORDER — HYDROMORPHONE HYDROCHLORIDE 1 MG/ML
INJECTION, SOLUTION INTRAMUSCULAR; INTRAVENOUS; SUBCUTANEOUS
Status: COMPLETED
Start: 2024-05-31 | End: 2024-05-31

## 2024-05-31 RX ORDER — IBUPROFEN 600 MG/1
600 TABLET ORAL EVERY 6 HOURS
Status: DISCONTINUED | OUTPATIENT
Start: 2024-05-31 | End: 2024-06-03 | Stop reason: HOSPADM

## 2024-05-31 RX ORDER — HYDROMORPHONE HYDROCHLORIDE 2 MG/ML
0.2 INJECTION, SOLUTION INTRAMUSCULAR; INTRAVENOUS; SUBCUTANEOUS EVERY 5 MIN PRN
Status: DISCONTINUED | OUTPATIENT
Start: 2024-05-31 | End: 2024-05-31

## 2024-05-31 RX ORDER — OXYCODONE HYDROCHLORIDE 10 MG/1
10 TABLET ORAL EVERY 4 HOURS PRN
Status: DISCONTINUED | OUTPATIENT
Start: 2024-05-31 | End: 2024-05-31

## 2024-05-31 RX ORDER — OXYCODONE HYDROCHLORIDE 5 MG/1
5 TABLET ORAL EVERY 4 HOURS PRN
Status: DISCONTINUED | OUTPATIENT
Start: 2024-05-31 | End: 2024-05-31

## 2024-05-31 RX ORDER — HYDROMORPHONE HYDROCHLORIDE 1 MG/ML
0.5 INJECTION, SOLUTION INTRAMUSCULAR; INTRAVENOUS; SUBCUTANEOUS
Status: DISCONTINUED | OUTPATIENT
Start: 2024-05-31 | End: 2024-05-31

## 2024-05-31 RX ORDER — AMOXICILLIN 250 MG
1 CAPSULE ORAL NIGHTLY PRN
Status: DISCONTINUED | OUTPATIENT
Start: 2024-05-31 | End: 2024-06-03 | Stop reason: HOSPADM

## 2024-05-31 RX ORDER — HYDROMORPHONE HCL IN 0.9% NACL 6 MG/30 ML
PATIENT CONTROLLED ANALGESIA SYRINGE INTRAVENOUS CONTINUOUS
Status: DISCONTINUED | OUTPATIENT
Start: 2024-05-31 | End: 2024-05-31

## 2024-05-31 RX ORDER — OXYCODONE HYDROCHLORIDE 5 MG/1
5 TABLET ORAL EVERY 4 HOURS PRN
Status: DISCONTINUED | OUTPATIENT
Start: 2024-05-31 | End: 2024-06-03 | Stop reason: HOSPADM

## 2024-05-31 RX ORDER — DOCUSATE SODIUM 100 MG/1
200 CAPSULE, LIQUID FILLED ORAL 2 TIMES DAILY
Status: DISCONTINUED | OUTPATIENT
Start: 2024-05-31 | End: 2024-06-03 | Stop reason: HOSPADM

## 2024-05-31 RX ADMIN — DOCUSATE SODIUM 200 MG: 100 CAPSULE, LIQUID FILLED ORAL at 09:05

## 2024-05-31 RX ADMIN — OXYCODONE HYDROCHLORIDE 5 MG: 5 TABLET ORAL at 04:05

## 2024-05-31 RX ADMIN — PRENATAL VIT W/ FE FUMARATE-FA TAB 27-0.8 MG 1 TABLET: 27-0.8 TAB at 09:05

## 2024-05-31 RX ADMIN — TRANEXAMIC ACID 2000 MG: 100 INJECTION, SOLUTION INTRAVENOUS at 11:05

## 2024-05-31 RX ADMIN — HYDROMORPHONE HYDROCHLORIDE 0.2 MG: 1 INJECTION, SOLUTION INTRAMUSCULAR; INTRAVENOUS; SUBCUTANEOUS at 12:05

## 2024-05-31 RX ADMIN — ACETAMINOPHEN 650 MG: 325 TABLET, FILM COATED ORAL at 12:05

## 2024-05-31 RX ADMIN — OXYCODONE HYDROCHLORIDE 10 MG: 10 TABLET ORAL at 09:05

## 2024-05-31 RX ADMIN — ANTIHEMOPHILIC FACTOR/VON WILLEBRAND FACTOR COMPLEX (HUMAN) 3990 UNITS: KIT at 12:05

## 2024-05-31 RX ADMIN — ACETAMINOPHEN 650 MG: 325 TABLET, FILM COATED ORAL at 09:05

## 2024-05-31 RX ADMIN — Medication: at 12:05

## 2024-05-31 RX ADMIN — Medication 1995 UNITS: at 10:05

## 2024-05-31 RX ADMIN — TRANEXAMIC ACID 2000 MG: 100 INJECTION, SOLUTION INTRAVENOUS at 05:05

## 2024-05-31 RX ADMIN — BETAMETHASONE SODIUM PHOSPHATE AND BETAMETHASONE ACETATE 12 MG: 3; 3 INJECTION, SUSPENSION INTRA-ARTICULAR; INTRALESIONAL; INTRAMUSCULAR at 09:05

## 2024-05-31 RX ADMIN — TRANEXAMIC ACID 2000 MG: 100 INJECTION, SOLUTION INTRAVENOUS at 02:05

## 2024-05-31 RX ADMIN — Medication 1995 UNITS: at 11:05

## 2024-05-31 NOTE — PLAN OF CARE
Patient safety maintained, side rails up, bed low and locked position. العراقي removed, patient due to void. Pain well controlled with PRN pain medication, PCA discontinued this morning per patient request so she could visit baby in NICU. Fundus midline, firm, with moderate lochia; pads weighed per MD order. Patient visiting baby in NICU. Dressing clean, dry, and intact. No further needs at this time.

## 2024-05-31 NOTE — PROGRESS NOTES
POSTPARTUM PROGRESS NOTE    Subjective:     PPD/POD#: 1   Procedure: Primary LTCS for fetal malpresentation and vWF with unknown assay levels (GETA)   EGA: 32w1d   N/V: No   F/C: No   Abd Pain: Mild, well-controlled with oral pain medication   Lochia: Mild   Voiding: Yes, via abad   Ambulating: No   Bowel fnc: No   Contraception: Nexplanon to be placed prior to discharge     Objective:      Temp:  [98.6 °F (37 °C)-99.4 °F (37.4 °C)] 99.4 °F (37.4 °C)  Pulse:  [100-117] 100  Resp:  [16-24] 18  SpO2:  [96 %-100 %] 97 %  BP: (121-161)/(63-94) 127/77    Abdomen: Soft, appropriately tender   Uterus: Firm, no fundal tenderness   Incision: Bandage in place without shadowing     Lab Review    Recent Labs   Lab 05/30/24 2113      K 3.7      CO2 19*   BUN 6   CREATININE 0.6      PROT 7.1   BILITOT 0.3   ALKPHOS 108   ALT 19   AST 20       Recent Labs   Lab 05/30/24 2113 05/30/24  2252 05/30/24  2256   WBC 14.33*  --   --    HGB 13.3  --   --    HCT 39.2 41 41   MCV 87  --   --      --   --          I/O    Intake/Output Summary (Last 24 hours) at 5/31/2024 1810  Last data filed at 5/31/2024 1144  Gross per 24 hour   Intake 251.34 ml   Output 1653 ml   Net -1401.66 ml        Assessment and Plan:   Postpartum care:  - Patient doing well.  - Continue routine management and advances.  - Remove abad and perform passive voiding trial    Von Willebrand Disease  - follows closely by MFSUSANA (Dr. Yates), Dr. Young (her hematologist), and Dr. Magaña ([facility])  - presumed Type 2M vWD   - vW factor activity 84% on 4/8/24, repeat obtained on admit  -Recommendations per Heme/Onc  - postpartum: Humate-P 50 units/kilogram following delivery then 25 units/kilogram every 12-24 hours for 3-5 days. Tranexamic acid 25 milligrams/kilogram orally every 6-8 hours for 5 days.     Obesity  - BMI 33  - Encourage ambulation  - Lovenox: not indicated    gHTN  - BP as above without sustained severe range BP  -  asymptomatic  - preE labs as above  - Mag: not indicated  - Hypertensive agent not indicated    Chlamydia  - positive at initial OB apt   - DOUGLAS neg    Johnny Flores MD PGY-2  Obstetrics and Gynecology

## 2024-05-31 NOTE — PLAN OF CARE
Copied from baby's chart    SOCIAL WORK DISCHARGE PLANNING ASSESSMENT     SW completed discharge planning assessment with pt's mother in mother's room 614 .  Pt's mother was easily engaged. Education on the role of  was provided. Emotional support provided throughout assessment.        Legal Name: Thelma Morfin         :  2022  Address: [address] [place] [address], [place] [address]  Parent's Phone Numbers: [phone]  [phone]     Pediatrician: None Selected. [facility] list provided.    Education: Information given on NICU Education Classes; Physician/NNP daily rounds; and Postpartum Depression signs.   Potential Eligibility for SSI Benefits: No        Problem List       Patient Active Problem List   Diagnosis    Respiratory distress syndrome in     Alteration in nutrition in infant    Need for observation and evaluation of  for sepsis    Premature infant of 32 weeks gestation    Health care maintenance               [facility]           PADMINI: 24     Birth Weight:   1.9 kg (4 lb 3 oz)              Birth Length:                       Gestational Age: 32w1d           Apgars    Living status: Living  Apgar Component Scores:  1 min.:  5 min.:  10 min.:  15 min.:  20 min.:    Skin color:  0  1          Heart rate:  1  2          Reflex irritability:  0  2          Muscle tone:  1  1          Respiratory effort:  0  2          Total:  2  8          Apgars assigned by: NICU           24 1545   NICU Assessment   Assessment Type Discharge Planning Assessment   Source of Information patient   Verified Demographic and Insurance Information Yes   Insurance Medicaid   Spiritual Affiliation Nondenominational   Lives With mother;father   Number people in home 3 including infant   Relationship Status of Parents In relationship   Other children (include names and ages) 0   Mother Employed No   Father's Involvement Fully Involved   Is Father signing the  birth certificate Yes   Father Name and  Pedrito [phone]   Family Involvement High   Other Contacts Names and Numbers Wei [phone]   Infant Feeding Plan breastfeeding   Does baby have crib or safe sleep space? Yes   Do you have a car seat? Yes   Resource/Environmental Concerns none   Environment Concerns none   Resources/Education Provided [facility];Healthy [place] Insurance plan;Preparing for Your Baby's Discharge Home;Support Resources for NICU Families;WIC;Early Intervention Program;Post Partum Depression;My NICU Baby Jaime;[facility];Parents as teachers   DME Needed Upon Discharge  none   DCFS No indications (Indicators for Report)   Discharge Plan A Home with family      [facility] - NICU ([facility])  Initial Discharge Assessment        Primary Care Provider: No, Primary Doctor     Admission Diagnosis: Prematurity, 1,750-1,999 grams, 31-32 completed weeks [P07.17]     Admission Date: 2024  Expected Discharge Date:         Payor: MEDICAID / Plan: PENDING MEDICAID BABY / Product Type: Government /      Extended Emergency Contact Information  Primary Emergency Contact: JOSE MANUEL FLOREZ  Address: [address] [place] [address]  Home Phone: [phone]  Mobile Phone: [phone]  Relation: Mother     Discharge Plan A: (P) Home with family     No Pharmacies Listed

## 2024-05-31 NOTE — PLAN OF CARE
Problem: Adult Inpatient Plan of Care  Goal: Plan of Care Review  Outcome: Progressing  Goal: Patient-Specific Goal (Individualized)  Outcome: Progressing  Goal: Absence of Hospital-Acquired Illness or Injury  Outcome: Progressing  Goal: Optimal Comfort and Wellbeing  Outcome: Progressing  Goal: Readiness for Transition of Care  Outcome: Progressing     Problem: Postpartum ( Delivery)  Goal: Successful Parent Role Transition  Outcome: Progressing  Goal: Hemostasis  Outcome: Progressing  Goal: Effective Bowel Elimination  Outcome: Progressing  Goal: Fluid and Electrolyte Balance  Outcome: Progressing  Goal: Absence of Infection Signs and Symptoms  Outcome: Progressing  Goal: Anesthesia/Sedation Recovery  Outcome: Progressing  Goal: Optimal Pain Control and Function  Outcome: Progressing  Goal: Nausea and Vomiting Relief  Outcome: Progressing  Goal: Effective Urinary Elimination  Outcome: Progressing  Goal: Effective Oxygenation and Ventilation  Outcome: Progressing

## 2024-05-31 NOTE — LACTATION NOTE
Due to pain control, Pt was not started on breast pump prior to admit to MBU. Pump brought into room. RN offered pump education. Pt requested to wait. RN reeducated on importance of stimulating the breast within 6hrs of delivery.

## 2024-05-31 NOTE — CONSULTS
Inpatient consult to Hematology/Oncology  Consult performed by: Carrie Soto MD  Consult ordered by: Carolyn Spears MD      Hematology / Oncology   Consult Note    Consult Requested By: OB   Reason for Consult: vWD    SUBJECTIVE:   Admit date: 2024  History of Present Illness: Rosa Alvarez is a 23 y.o. female w/ vWD likely type 2A    Patient presented with  labor. Delivered at 32w via csection on 24    Patient has been followed by Dr Young for hematology, last seen 4/3/24    Review of records shows no excessive bleeding at surgery  Has been started on Humate P and Tranexemic acid    Patient unavailable for HPI due to being with her new baby in NICU    Review of Systems  Review of Systems   Unable to perform ROS: Other   Patient not in room (NICU)    PTA Medications   Medication Sig    ferrous sulfate 325 (65 FE) MG EC tablet Take 1 tablet (325 mg total) by mouth 4 (four) times a week.    prenatal vit/iron fum/folic ac (PRENATAL 1+1 ORAL) Take by mouth.    progesterone (PROMETRIUM) 200 MG capsule Place 1 capsule (200 mg total) vaginally nightly.      Review of patient's allergies indicates:  No Known Allergies    Past Medical History:   Diagnosis Date    Hereditary factor VIII deficiency     Von Willebrand disease        Past Surgical History:   Procedure Laterality Date     SECTION N/A 2024    Procedure:  SECTION;  Surgeon: Gwendolyn Luevano MD;  Location: [facility] [place] L&D;  Service: OB/GYN;  Laterality: N/A;      No family history on file.    Social History     Tobacco Use    Smoking status: Former     Current packs/day: 0.00     Types: Cigarettes, Vaping with nicotine     Quit date: 2023     Years since quittin.5    Smokeless tobacco: Never   Substance Use Topics    Alcohol use: Never    Drug use: Never        OBJECTIVE:     Vital Signs (Most Recent)   Temp: 98.7 °F (37.1 °C) (24 1137)  Pulse: 100 (24 1137)  Resp: 18 (24 1137)  BP: 132/72 (24  "1137)  SpO2: 96 % (05/31/24 1137)  Body mass index is 33.24 kg/m².      Physical Exam:  Physical Exam    Cardiac Enzymes: Ejection Fractions:    No results for input(s): "CPK", "CPKMB", "MB", "TROPONINI" in the last 72 hours. No results found for: "EF"     Chemistries:   Recent Labs   Lab 05/30/24 2113      K 3.7      CO2 19*   BUN 6   CREATININE 0.6   CALCIUM 9.6   PROT 7.1   BILITOT 0.3   ALKPHOS 108   ALT 19   AST 20        CBC/Anemia Labs: Coags:    Recent Labs   Lab 05/30/24 2113 05/30/24 2252 05/30/24 2256   WBC 14.33*  --   --    HGB 13.3  --   --    HCT 39.2 41 41     --   --    MCV 87  --   --    RDW 12.9  --   --     Recent Labs   Lab 05/30/24 2110   INR 0.9   APTT 28.3        POCT Glucose: HbA1c:    No results for input(s): "POCTGLUCOSE" in the last 168 hours. No results found for: "HGBA1C"     Lines/Drains:       Peripheral IV - Single Lumen 05/30/24 2145 18 G Left;Posterior Hand (Active)   Site Assessment Clean;Dry;Intact;No redness;No swelling 05/31/24 0235   Extremity Assessment Distal to IV No abnormal discoloration;No redness;No swelling;No warmth 05/31/24 0235   Line Status Infusing 05/31/24 0235   Dressing Status Clean;Dry;Intact 05/31/24 0235   Dressing Intervention Integrity maintained 05/31/24 0235   Reason Not Rotated Not due 05/30/24 2330   Number of days: 0            Urethral Catheter 05/30/24 2226 Non-latex;Straight-tip 16 Fr. (Active)   $ العراقي Insertion Bedside Insertion Performed 05/30/24 2330   Site Assessment Clean;Intact 05/31/24 0235   Collection Container Urimeter 05/31/24 0235   Securement Method secured to top of thigh w/ adhesive device 05/31/24 0235   Catheter Care Performed yes 05/31/24 0235   Reason for Continuing Urinary Catheterization Required immobilization 05/31/24 0235   CAUTI Prevention Bundle Securement Device in place with 1" slack;Intact seal between catheter & drainage tubing;Drainage bag/urimeter off the floor;No dependent loops or " kinks;Drainage bag/urimeter not overfilled (<2/3 full);Drainage bag/urimeter below bladder 24 2330   Output (mL) 120 mL 24 0600   Number of days: 0         ASSESSMENT/PLAN:     Active Hospital Problems    Diagnosis  POA    * labor [O60.00]  Unknown      Resolved Hospital Problems   No resolved problems to display.       SUMMARY: 23 y.o. female here with  labor with vWD  Discussed case with nurse. No evidence of excessive bleeding  Patient has been tolerating Humate P  Will follow up with patient tomorrow to determine bleeding status     Recommendations:   Humate-P 50 units/kilogram following delivery then 25 units/kilogram every 12-24 hours for 3-5 days.    Tranexamic acid 25 milligrams/kilogram orally every 6-8 hours for 5 days.

## 2024-05-31 NOTE — L&D DELIVERY NOTE
Section Procedure Note    Procedure Date: 2024     Procedure:   1.   Section via Pfannenstiel skin incision    Indications:   1. malpresentation: breech   2. PTL     Pre-operative Diagnosis:   IUP at 32w1d pregnancy  vWF disease    Post-operative Diagnosis:   Same as above     Surgeon:  Gwendolyn Luevano MD Attending     Assistants: Genevieve Spears MD-PGY 3    Anesthesia: GETA     Findings:    1. Single viable  male infant, with APGARS 2/8, weight 1900g.   2. Normal appearing uterus, ovaries, and fallopian tubes.  3. Normal placenta.   4. Excellent hemostasis noted following closure of each tissue layer. ZEFERINO applied to hysterotomy and uterine serosa prophylactically. Administered 1g TXA at cord clamping due to vWF disease. Administered methergine 0.2mg IM at end of case due to uterine bogginess, resolved.     Estimated Blood Loss:  495 mL           Total IV Fluids: see anesthesia report      UOP: see anesthesia report     Specimens: Placenta, discarded    PreOp CBC:   Lab Results   Component Value Date    WBC 14.33 (H) 2024    HGB 13.3 2024    HCT 41 2024    MCV 87 2024     2024                     Complications:  None; patient tolerated the procedure well.           Disposition: PACU - hemodynamically stable.           Condition: stable    Procedure Details:   The patient was seen in the ED. BSU confirmed breech presentation. Initial SVE 5cm however on recheck 7cm with BBOW and palpable fetal cord. Decision made to proceed with emergent CS. Unable to obtain epidural anesthesia due to VWF disease, discussed plan for GETA with patient. The risks, benefits, complications, treatment options, and expected outcomes were discussed with the patient.  The patient concurred with the proposed plan, giving informed consent. The patient was taken to operating room, identified as Rosa Alvarez and the procedure verified as  delivery. A time out was held and  the above information confirmed.    The patient was prepped and draped in the usual sterile manner while placed in a dorsal supine position with a left lateral tilt.  A abad catheter was also placed per nursing. Preoperative antibiotics were administered. Following confirmed general anesthesia and intubation, a Pfannenstiel incision was made and carried down through the subcutaneous tissue to the fascia. Fascial incision was made and extended bluntly transversely. The peritoneum was identified, found to be free of adherent bowel and entered bluntly. Peritoneal incision was extended longitudinally with finger traction. The vesico-uterine peritoneum was identified and bladder blade was inserted. A low transverse uterine incision was made with knife and extended with finger fracture. The amniotic sac was ruptured with bluntly and the infant was noted to be in breech position. The fetal breech was brought to the incision and elevated out of the pelvis using normal breech manuevers. The patient delivered a single viable male infant without difficulty. After the umbilical cord was clamped and cut cord blood was obtained for evaluation. The placenta was removed intact and appeared normal and was discarded. The uterus was exteriorized. The uterine outline, tubes and ovaries appeared normal. The uterine incision was closed in one layers with running locked sutures of Vicryl. Additional figure 8 style sutures were thrown R of midline at superior edge of hysterotomy. Hemostasis was observed.     The uterus was returned to the abdominal cavity. Incision was reinspected and good hemostasis was noted. The abdominal cavity was wiped with wet laps to remove clots. The fascia was then reapproximated with running sutures of Vicryl. The subcutaneous fat and skin was reapproximated with Vicryl and Monocryl respectively. The wound was covered with a pressure dressing.     Instrument, sponge, and needle counts were correct prior the  abdominal closure and at the conclusion of the case.    Pt tolerated procedure well and was in stable condition after the procedure.    **NOTE: This patient IS a candidate for trial of labor after  delivery**    Genevieve Spears MD  PGY 3  Obstetrics and Gynecology

## 2024-05-31 NOTE — TRANSFER OF CARE
Anesthesia Transfer of Care Note    Patient: Rosa Alvarez    Procedure(s) Performed: Procedure(s) (LRB):   SECTION (N/A)    Patient location: PACU    Anesthesia Type: general    Transport from OR: Transported from OR on 6-10 L/min O2 by face mask with adequate spontaneous ventilation    Post pain: adequate analgesia    Post assessment: no apparent anesthetic complications and tolerated procedure well    Post vital signs: stable    Level of consciousness: awake    Nausea/Vomiting: no nausea/vomiting    Complications: none    Transfer of care protocol was followed    Last vitals: Visit Vitals  Wt 79.8 kg (175 lb 14.8 oz)   LMP 10/12/2023   Breastfeeding No   BMI 33.24 kg/m²

## 2024-05-31 NOTE — H&P
HISTORY AND PHYSICAL                                                OBSTETRICS          Subjective:       Rosa Alvarez is a 23 y.o.  female with IUP at 32w1d weeks gestation who presents with vaginal bleeding. Patient reports about 30 minutes prior to arrival she had a gush of blood with clots. She could not quantify how blood it was since she was on the was on the toilet.     Patient reports contractions, reports vaginal bleeding, denies LOF.   Fetal Movement: normal.    This IUP is complicated by fetal malpresentation (breech) ,Von Willebrand Disease, short cervix, h/o chlamydia (1T, neg DOUGLAS).     Review of Systems   Constitutional:  Negative for fever.   Respiratory:  Negative for shortness of breath.    Cardiovascular:  Negative for chest pain.   Gastrointestinal:  Negative for abdominal pain, nausea and vomiting.   Endocrine: Negative for hot flashes.   Genitourinary:  Negative for menstrual problem.   Integumentary:  Negative for breast mass, nipple discharge and breast skin changes.   Neurological:  Negative for headaches.   Hematological:  Does not bruise/bleed easily.   Psychiatric/Behavioral:  Negative for depression.    Breast: Negative for mass, mastodynia, nipple discharge and skin changes      Past Medical/Surgical Hx: reviewed in chart  Social/Family Hx: reviewed in chart  Allergies: reviewed in chart  Meds:   Medications Prior to Admission   Medication Sig Dispense Refill Last Dose    ferrous sulfate 325 (65 FE) MG EC tablet Take 1 tablet (325 mg total) by mouth 4 (four) times a week. 48 tablet 3     prenatal vit/iron fum/folic ac (PRENATAL 1+1 ORAL) Take by mouth.       progesterone (PROMETRIUM) 200 MG capsule Place 1 capsule (200 mg total) vaginally nightly. 60 capsule 3      OBHx:   OB History    Para Term  AB Living   1 0 0 0 0 0   SAB IAB Ectopic Multiple Live Births   0 0 0 0 0      # Outcome Date GA Lbr Graham/2nd Weight Sex Type Anes PTL Lv   1 Current                 Objective:       LMP 10/12/2023   Breastfeeding No   Physical Exam  Constitutional:       Appearance: Normal appearance.   Cardiovascular:      Rate and Rhythm: Normal rate.   Pulmonary:      Effort: Pulmonary effort is normal. No respiratory distress.   Neurological:      Mental Status: She is alert and oriented to person, place, and time.   Psychiatric:         Mood and Affect: Mood normal.         Behavior: Behavior normal.          FHT: 150 BPM/moderate variability/+accels/-decels   CTX: q 2-3 minutes     Dilation (cm): 5  Effacement (%): 90  Station: -1     Presentation: breech  EFW by Leopold's: 4.5 lbs    Lab Review  Lab Results   Component Value Date    GROUPTRH B POS 2024    INDIRECTCOOM NEG 2024    HGB 13.3 2024    HCT 39.2 2024     2024    RPR Non-reactive 2024    [id number] Negative 2024    HEPBSAG Non-reactive 2024    RUBELLAIMMUN Reactive 2024          Assessment:       32w1d weeks gestation with  labor     Active Hospital Problems    Diagnosis  POA    * labor [O60.00]  Unknown      Resolved Hospital Problems   No resolved problems to display.          Plan:    Labor  - SSE: 30 cc clot removed from posterior vaginal vault. No active bleeding.   - SVE: /-1 with BBOW > on recheck 1.5 hours later /0 with BBOW (umbilical cord palpated behind fetal bag).  - BSUS with breech presentation,  - Decision made to proceed with urgent pLTCS   Risks, benefits, alternatives and possible complications have been discussed in detail with the patient.   - Consents signed and to chart  - Admit to Labor and Delivery unit  - Spinal per Anesthesia  - CBC, T&S, vWF assay, coags collected on admit  - Notified staff, anesthesia, and heme oncology team.      32 weeks gestation  - primary OB: Dr. Jie Montes  - delivery consents and blood consents reviewed and signed at time of admission  - presentation: breech by BSUS;  - growth US:  1091g (37%), AC 22% @ 28w0d (5/1)  - prenatal labs: UTD  - aneuploidy screening: mat21 neg  - 1hr GTT: passed  - GBS: collected on admit   - Tdap: given 4/26/2024  - continue PNV     Short cervix  - 6mm cervical length noted on 4/15/24, declined cerclage  - patient has been taking vaginal progesterone qHS, will hold on admit given exposed membranes     Von Willebrand Disease  - follows closely by MFM (Dr. Yates), Dr. Young (her hematologist), and Dr. Magaña ([facility], [place], operated by [facility])  - presumed Type 2M vWD   - vW factor activity 84% on 4/8/24, repeat obtained on admit  - plan for deliver; please see note from Dr. Magaña on 4/24 for full recommendations:   - notify Hematology Oncology and blood bank when patient is admitted for delivery  - due to the delayed decrease in vWF after delivery(can take a few hours to occur),there is no need for a loading dose of Humate P.  - postpartum: Humate-P 50 units/kilogram following delivery then 25 units/kilogram every 12-24 hours for 3-5 days. Tranexamic acid 25 milligrams/kilogram orally every 6-8 hours for 5 days.    - 4u pRBC held, Humate-P ordered and approved by blood bank  - CBC, coags, vWF collected on admit      Chlamydia  - positive at initial OB apt   - DOUGLAS neg    Reyes Gomez MD  OBGYN PGY-2

## 2024-05-31 NOTE — ANESTHESIA PROCEDURE NOTES
Intubation    Date/Time: 5/30/2024 10:35 PM    Performed by: Shan Vasquez MD  Authorized by: Shan Vasquez MD    Intubation:     Induction:  Rapid sequence induction    Intubated:  Postinduction    Mask Ventilation:  N/a    Attempts:  1    Attempted By:  Resident anesthesiologist    Method of Intubation:  Video laryngoscopy    Blade:  Vilchis 3    Laryngeal View Grade: Grade I - full view of cords      Difficult Airway Encountered?: No      Complications:  None    Airway Device:  Oral endotracheal tube    Airway Device Size:  7.0    Style/Cuff Inflation:  Cuffed (inflated to minimal occlusive pressure)    Tube secured:  21    Secured at:  The teeth    Placement Verified By:  Capnometry    Complicating Factors:  None    Findings Post-Intubation:  BS equal bilateral and atraumatic/condition of teeth unchanged

## 2024-05-31 NOTE — ANESTHESIA PREPROCEDURE EVALUATION
[facility]  Anesthesia Pre-Operative Evaluation         Patient Name: Rosa Alvarez  YOB: 2000  MRN: [id number]    2024      Rosa Alvarez is a 23 y.o. female  with IUP @ 32w1d who presents to the [facility] with vaginal bleeding. This IUP is complicated by breech presentation, short cervix, and vWD type 2N.        OB History    Para Term  AB Living   1             SAB IAB Ectopic Multiple Live Births                  # Outcome Date GA Lbr Graham/2nd Weight Sex Type Anes PTL Lv   1 Current                Review of patient's allergies indicates:  No Known Allergies    Wt Readings from Last 1 Encounters:   24 2233 79.8 kg (175 lb 14.8 oz)   24 2200 79.8 kg (175 lb 14.8 oz)       BP Readings from Last 3 Encounters:   24 102/66   24 116/73   24 124/79       Patient Active Problem List   Diagnosis    Coagulation defect affecting pregnancy, antepartum    Short cervix during pregnancy in second trimester    Premature cervical dilation in third trimester    Von Willebrand disease    History of chlamydia     labor       No past surgical history on file.    Social History     Socioeconomic History    Marital status: Single   Tobacco Use    Smoking status: Former     Current packs/day: 0.00     Types: Cigarettes, Vaping with nicotine     Quit date: 2023     Years since quittin.5    Smokeless tobacco: Never   Substance and Sexual Activity    Alcohol use: Never    Drug use: Never    Sexual activity: Yes     Partners: Male     Social Determinants of Health     Financial Resource Strain: Low Risk  (2024)    Overall Financial Resource Strain (CARDIA)     Difficulty of Paying Living Expenses: Not hard at all         Chemistry        Component Value Date/Time     2024    K 3.7 2024     2024    CO2 19 (L) 2024    BUN 6 2024    CREATININE 0.6 2024      2024        Component Value Date/Time    CALCIUM 9.6 2024    ALKPHOS 108 2024    AST 20 2024    ALT 19 2024    BILITOT 0.3 2024            Lab Results   Component Value Date    WBC 14.33 (H) 2024    HGB 13.3 2024    HCT 41 2024    MCV 87 2024     2024       Recent Labs     24   INR 0.9   APTT 28.3               Pre-op Assessment    I have reviewed the Patient Summary Reports.     I have reviewed the Nursing Notes. I have reviewed the NPO Status.   I have reviewed the Medications.     Review of Systems  Anesthesia Hx:  No problems with previous Anesthesia             Denies Family Hx of Anesthesia complications.     Hematology/Oncology:                  --  Coag Disorders: Bleeding Disorder: Von Willebrands Disease                        Cardiovascular:  Cardiovascular Normal Exercise tolerance: good                                           Pulmonary:  Pulmonary Normal                       Neurological:  Neurology Normal                                        Past Medical History:   Diagnosis Date    Hereditary factor VIII deficiency     Von Willebrand disease      No past surgical history on file.  Patient Active Problem List   Diagnosis    Coagulation defect affecting pregnancy, antepartum    Short cervix during pregnancy in second trimester    Premature cervical dilation in third trimester    Von Willebrand disease    History of chlamydia     labor     Facility-Administered Medications as of 2024   Medication Dose Route Frequency Provider Last Rate Last Admin    0.9%  NaCl infusion (for blood administration)   Intravenous Q24H PRN Reyes Gomez MD        0.9%  NaCl infusion (for blood administration)   Intravenous Q24H PRN Reyes Gomez MD        acetaminophen 1,000 mg/100 mL (10 mg/mL) injection   Intravenous PRN Shan Vasquez MD   1,000 mg at 24 2250    [START ON  5/31/2024] acetaminophen tablet 650 mg  650 mg Oral Q6H Carolynn Ospina MD        antihemophilic factor-vWF (HUMATE-P) injection 3,990 Units  50 Units/kg Intravenous Once Reyes Gomez MD        azithromycin (ZITHROMAX) 500 mg in dextrose 5 % (D5W) 250 mL IVPB (Vial-Mate)  500 mg Intravenous Once Kellen Fisher MD        azithromycin (ZITHROMAX) 500 mg injection             betamethasone acetate-betamethasone sodium phosphate injection 12 mg  12 mg Intramuscular Q24H Carolyn Spears MD   12 mg at 05/30/24 2112    carboprost injection 250 mcg  250 mcg Intramuscular Q15 Min PRN Carolynn Ospina MD        ceFAZolin 2 g in dextrose 5 % in water (D5W) 50 mL IVPB (MB+)  2 g Intravenous On Call Procedure Carolynn Ospina MD        ceFAZolin injection   Intravenous PRN Shan Vasquez MD   2 g at 05/30/24 2227    dexAMETHasone injection   Intravenous PRN Shan Vasqeuz MD   4 mg at 05/30/24 2238    diphenhydrAMINE capsule 25 mg  25 mg Oral Q6H PRN Carolynn Ospina MD        diphenhydrAMINE injection 12.5 mg  12.5 mg Intravenous Q20 Min PRN Carolynn Ospina MD        [COMPLETED] famotidine (PF) injection 20 mg  20 mg Intravenous On Call Procedure Carolnyn Ospina MD   20 mg at 05/30/24 2241    fentaNYL injection   Intrathecal PRN Shan Vasquez MD   50 mcg at 05/30/24 2252    ketorolac injection 30 mg  30 mg Intravenous Q6H Carolynn Ospina MD        Followed by    [START ON 5/31/2024] ibuprofen tablet 800 mg  800 mg Oral Q8H Carolynn Ospina MD        ketamine in 0.9 % sod chloride 50 mg/5 mL (10 mg/mL) injection   Intravenous PRN Shan Vasquez MD   25 mg at 05/30/24 2236    lactated ringers infusion   Intravenous Continuous Carolynn Ospina MD        lactated ringers infusion   Intravenous Continuous Carolynn Ospina MD        lactated ringers infusion   Intravenous Continuous PRN Shan Vasquez MD   New Bag at 05/30/24 2220    LIDOcaine (PF) 20 mg/ml (2%) injection   Intravenous PRN  Shan Vasquez MD   3 mL at 05/30/24 2234    methylergonovine injection 200 mcg  200 mcg Intramuscular Once PRN Carolynn Ospina MD        midazolam (PF) (VERSED) 5 mg/mL injection   Intravenous PRN Shan Vasquez MD   1 mg at 05/30/24 2221    miSOPROStoL tablet 800 mcg  800 mcg Oral Once PRN Carolynn Ospina MD        miSOPROStoL tablet 800 mcg  800 mcg Rectal Once PRN Carolynn Ospina MD        nalbuphine injection 5 mg  5 mg Intravenous Once PRN Carolynn Ospina MD        ondansetron disintegrating tablet 8 mg  8 mg Oral Q8H PRN Carolynn Ospina MD        ondansetron injection 4 mg  4 mg Intravenous Q6H PRN Carolynn Ospina MD        ondansetron injection   Intravenous PRN Shan Vasquez MD   4 mg at 05/30/24 2228    oxytocin 30 units in 500 mL lactated ringers infusion (non-titrating)  95 angel-units/min Intravenous Continuous Carolynn Ospina MD        oxytocin injection   Intravenous PRN Shan Vasquez MD   12 Units at 05/30/24 2254    prochlorperazine injection Soln 5 mg  5 mg Intravenous Q6H PRN Carolynn Ospina MD        propofol (DIPRIVAN) 10 mg/mL infusion   Intravenous PRN Shan Vasquez MD   50 mg at 05/30/24 2236    rocuronium injection   Intravenous PRN Shan Vasquez MD   20 mg at 05/30/24 2253    [COMPLETED] sodium citrate-citric acid 500-334 mg/5 ml solution 30 mL  30 mL Oral On Call Procedure Carolynn Ospina MD   30 mL at 05/30/24 2242    succinylcholine injection   Intravenous PRN Shan Vasquez MD   140 mg at 05/30/24 2234    tranexamic acid in NaCl,iso-os IVPB 1,000 mg  1,000 mg Intravenous Q30 Min PRN Carolynn Ospina MD        tranexamic acid injection Soln   Intravenous PRN Shan Vasquez MD   1,000 mg at 05/30/24 2238     Outpatient Medications as of 5/30/2024   Medication Sig Dispense Refill    ferrous sulfate 325 (65 FE) MG EC tablet Take 1 tablet (325 mg total) by mouth 4 (four) times a week. 48 tablet 3    prenatal vit/iron fum/folic ac (PRENATAL 1+1  ORAL) Take by mouth.      progesterone (PROMETRIUM) 200 MG capsule Place 1 capsule (200 mg total) vaginally nightly. 60 capsule 3        Physical Exam  General: Well nourished, Cooperative, Alert and Oriented    Airway:  Mallampati: II   Mouth Opening: Normal  TM Distance: Normal  Tongue: Normal  Neck ROM: Normal ROM    Chest/Lungs:  Normal Respiratory Rate    Heart:  Rate: Normal      Wt Readings from Last 3 Encounters:   05/30/24 79.8 kg (175 lb 14.8 oz)   05/24/24 79.8 kg (175 lb 14.8 oz)   05/10/24 78 kg (171 lb 15.3 oz)     Temp Readings from Last 3 Encounters:   05/11/24 36.7 °C (98.1 °F)   11/25/23 37.1 °C (98.8 °F) (Oral)   09/29/21 37 °C (98.6 °F) (Oral)     BP Readings from Last 3 Encounters:   05/24/24 102/66   05/11/24 116/73   05/09/24 124/79     Pulse Readings from Last 3 Encounters:   05/24/24 106   05/11/24 86   05/09/24 107     Lab Results   Component Value Date    WBC 14.33 (H) 05/30/2024    HGB 13.3 05/30/2024    HCT 41 05/30/2024    MCV 87 05/30/2024     05/30/2024         Chemistry        Component Value Date/Time     05/30/2024 2113    K 3.7 05/30/2024 2113     05/30/2024 2113    CO2 19 (L) 05/30/2024 2113    BUN 6 05/30/2024 2113    CREATININE 0.6 05/30/2024 2113     05/30/2024 2113        Component Value Date/Time    CALCIUM 9.6 05/30/2024 2113    ALKPHOS 108 05/30/2024 2113    AST 20 05/30/2024 2113    ALT 19 05/30/2024 2113    BILITOT 0.3 05/30/2024 2113        Results for orders placed or performed during the hospital encounter of 09/29/21   EKG 12-lead    Collection Time: 09/29/21 10:27 PM    Narrative    Test Reason : R07.9,    Vent. Rate : 100 BPM     Atrial Rate : 100 BPM     P-R Int : 110 ms          QRS Dur : 074 ms      QT Int : 328 ms       P-R-T Axes : 085 093 066 degrees     QTc Int : 423 ms    Sinus rhythm with short ID  Possible Left atrial enlargement  Rightward axis  Borderline Abnormal ECG  No previous ECGs available  Confirmed by Arun COTTO, Ali  ([id number]) on 9/30/2021 6:45:07 AM    Referred By: SANTOS   SELF           Confirmed By:David Dias MD        Anesthesia Plan  Type of Anesthesia, risks & benefits discussed:    Anesthesia Type: Gen ETT  Intra-op Monitoring Plan: Standard ASA Monitors  Post Op Pain Control Plan: multimodal analgesia and IV/PO Opioids PRN  Induction:  IV  Airway Plan: Video, Post-Induction  Informed Consent: Informed consent signed with the Patient and all parties understand the risks and agree with anesthesia plan.  All questions answered.   ASA Score: 3  Day of Surgery Review of History & Physical: H&P Update referred to the surgeon/provider.    Ready For Surgery From Anesthesia Perspective.     .

## 2024-05-31 NOTE — PLAN OF CARE
Mother should pump 8 or more times a day. Clean all pump parts with each use. Sterilize parts once a day. Label milk with date, time and meds. Bring milk straight to NICU or give to nurse to store in [facility] fridge. Call LC with questions.

## 2024-05-31 NOTE — ANESTHESIA POSTPROCEDURE EVALUATION
Anesthesia Post Evaluation    Patient: Rosa Alvarez    Procedure(s) Performed: Procedure(s) (LRB):   SECTION (N/A)    Final Anesthesia Type: general      Patient location during evaluation: PACU  Patient participation: Yes- Able to Participate  Level of consciousness: awake and alert  Post-procedure vital signs: reviewed and stable  Pain management: adequate  Airway patency: patent    PONV status at discharge: No PONV  Anesthetic complications: no      Cardiovascular status: blood pressure returned to baseline  Respiratory status: unassisted  Hydration status: euvolemic  Follow-up not needed.          Vitals Value Taken Time   /73 24   Temp 37 °C (98.6 °F) 24   Pulse 112 24   Resp 20 24   SpO2 98 % 24         Event Time   Out of Recovery 2024 02:35:00         Pain/Leonel Score: Pain Rating Prior to Med Admin: 10 (2024 12:13 AM)

## 2024-05-31 NOTE — LACTATION NOTE
05/31/24 0920   Maternal Assessment   Breast Shape Right:;round   Breast Density Right:;soft   Areola Right:;elastic   Nipples Right:;everted   Equipment Type   Breast Pump Type double electric, hospital grade   Breast Pump Flange Type hard   Breast Pump Flange Size 24 mm   Breast Pumping   Breast Pumping Interventions frequent pumping encouraged     Educated pt on use of breastpump. Pt did not want to pump at this time. Questions answered.

## 2024-06-01 PROBLEM — Z98.891 S/P CESAREAN SECTION: Status: ACTIVE | Noted: 2024-06-01

## 2024-06-01 LAB
BASOPHILS # BLD AUTO: 0.03 K/UL (ref 0–0.2)
BASOPHILS NFR BLD: 0.2 % (ref 0–1.9)
DIFFERENTIAL METHOD BLD: ABNORMAL
EOSINOPHIL # BLD AUTO: 0 K/UL (ref 0–0.5)
EOSINOPHIL NFR BLD: 0 % (ref 0–8)
ERYTHROCYTE [DISTWIDTH] IN BLOOD BY AUTOMATED COUNT: 12.9 % (ref 11.5–14.5)
HCT VFR BLD AUTO: 35.7 % (ref 37–48.5)
HGB BLD-MCNC: 11.6 G/DL (ref 12–16)
IMM GRANULOCYTES # BLD AUTO: 0.21 K/UL (ref 0–0.04)
IMM GRANULOCYTES NFR BLD AUTO: 1.2 % (ref 0–0.5)
LYMPHOCYTES # BLD AUTO: 1.1 K/UL (ref 1–4.8)
LYMPHOCYTES NFR BLD: 6.1 % (ref 18–48)
MCH RBC QN AUTO: 28.9 PG (ref 27–31)
MCHC RBC AUTO-ENTMCNC: 32.5 G/DL (ref 32–36)
MCV RBC AUTO: 89 FL (ref 82–98)
MONOCYTES # BLD AUTO: 0.4 K/UL (ref 0.3–1)
MONOCYTES NFR BLD: 2 % (ref 4–15)
NEUTROPHILS # BLD AUTO: 16.1 K/UL (ref 1.8–7.7)
NEUTROPHILS NFR BLD: 90.5 % (ref 38–73)
NRBC BLD-RTO: 0 /100 WBC
PLATELET # BLD AUTO: 299 K/UL (ref 150–450)
PMV BLD AUTO: 8.9 FL (ref 9.2–12.9)
RBC # BLD AUTO: 4.01 M/UL (ref 4–5.4)
VWF:AC ACT/NOR PPP IA: 84 % (ref 55–200)
WBC # BLD AUTO: 17.81 K/UL (ref 3.9–12.7)

## 2024-06-01 PROCEDURE — 25000003 PHARM REV CODE 250

## 2024-06-01 PROCEDURE — 11000001 HC ACUTE MED/SURG PRIVATE ROOM

## 2024-06-01 PROCEDURE — 85025 COMPLETE CBC W/AUTO DIFF WBC: CPT

## 2024-06-01 PROCEDURE — 36415 COLL VENOUS BLD VENIPUNCTURE: CPT

## 2024-06-01 PROCEDURE — 63600531 PHARM REV CODE 636 NO ALT 250 W HCPCS: Mod: JZ,JG

## 2024-06-01 RX ORDER — LIDOCAINE HYDROCHLORIDE 10 MG/ML
5 INJECTION INFILTRATION; PERINEURAL ONCE
Status: DISCONTINUED | OUTPATIENT
Start: 2024-06-01 | End: 2024-06-03 | Stop reason: HOSPADM

## 2024-06-01 RX ADMIN — DOCUSATE SODIUM 200 MG: 100 CAPSULE, LIQUID FILLED ORAL at 08:06

## 2024-06-01 RX ADMIN — PRENATAL VIT W/ FE FUMARATE-FA TAB 27-0.8 MG 1 TABLET: 27-0.8 TAB at 09:06

## 2024-06-01 RX ADMIN — OXYCODONE HYDROCHLORIDE 10 MG: 10 TABLET ORAL at 08:06

## 2024-06-01 RX ADMIN — OXYCODONE HYDROCHLORIDE 10 MG: 10 TABLET ORAL at 03:06

## 2024-06-01 RX ADMIN — Medication 1995 UNITS: at 12:06

## 2024-06-01 RX ADMIN — ACETAMINOPHEN 650 MG: 325 TABLET, FILM COATED ORAL at 11:06

## 2024-06-01 RX ADMIN — ACETAMINOPHEN 650 MG: 325 TABLET, FILM COATED ORAL at 05:06

## 2024-06-01 RX ADMIN — TRANEXAMIC ACID 2000 MG: 100 INJECTION, SOLUTION INTRAVENOUS at 05:06

## 2024-06-01 RX ADMIN — TRANEXAMIC ACID 2000 MG: 100 INJECTION, SOLUTION INTRAVENOUS at 09:06

## 2024-06-01 RX ADMIN — OXYCODONE HYDROCHLORIDE 10 MG: 10 TABLET ORAL at 01:06

## 2024-06-01 RX ADMIN — OXYCODONE HYDROCHLORIDE 10 MG: 10 TABLET ORAL at 11:06

## 2024-06-01 RX ADMIN — Medication 1995 UNITS: at 11:06

## 2024-06-01 RX ADMIN — OXYCODONE HYDROCHLORIDE 10 MG: 10 TABLET ORAL at 05:06

## 2024-06-01 RX ADMIN — DOCUSATE SODIUM 200 MG: 100 CAPSULE, LIQUID FILLED ORAL at 09:06

## 2024-06-01 NOTE — NURSING
RN notified doctor that she had mistakenly given the pt betamethasone 12 mg IV due to it still being an active order.  Pt. In stable condition, no s/s of distress noted.  No new orders given.

## 2024-06-01 NOTE — PROGRESS NOTES
POSTPARTUM PROGRESS NOTE    Subjective:     PPD/POD#: 2   Procedure: Primary LTCS for fetal malpresentation and vWF with unknown assay levels (GETA)   EGA: 32w1d   N/V: No   F/C: No   Abd Pain: Mild, well-controlled with oral pain medication   Lochia: Mild   Voiding: Yes   Ambulating: Yes   Bowel fnc: No   Contraception: Nexplanon to be placed prior to discharge     Objective:      Temp:  [98.5 °F (36.9 °C)-99.4 °F (37.4 °C)] 98.7 °F (37.1 °C)  Pulse:  [100-105] 105  Resp:  [18] 18  SpO2:  [96 %-99 %] 96 %  BP: (121-139)/(63-86) 136/74    Abdomen: Soft, appropriately tender   Uterus: Firm, no fundal tenderness   Incision: Bandage in place without shadowing     Lab Review    Recent Labs   Lab 05/30/24 2113      K 3.7      CO2 19*   BUN 6   CREATININE 0.6      PROT 7.1   BILITOT 0.3   ALKPHOS 108   ALT 19   AST 20       Recent Labs   Lab 05/30/24 2113 05/30/24 2252 05/30/24 2256   WBC 14.33*  --   --    HGB 13.3  --   --    HCT 39.2 41 41   MCV 87  --   --      --   --          I/O    Intake/Output Summary (Last 24 hours) at 6/1/2024 0514  Last data filed at 5/31/2024 2113  Gross per 24 hour   Intake --   Output 1343 ml   Net -1343 ml        Assessment and Plan:   Postpartum care:  - Patient doing well.  - Continue routine management and advances.    Von Willebrand Disease  - follows closely by [facility] (Dr. Yates), Dr. Young (her hematologist), and Dr. Magaña ([facility])  - presumed Type 2M vWD   - vW factor activity 84% on 4/8/24, repeat obtained on admit  -Recommendations per Heme/Onc  - postpartum: Humate-P 50 units/kilogram following delivery then 25 units/kilogram every 12-24 hours for 3-5 days. Tranexamic acid 25 milligrams/kilogram orally every 6-8 hours for 5 days.     Obesity  - BMI 33  - Encourage ambulation  - Lovenox: not indicated    gHTN  - BP as above, no severe range BP  - asymptomatic  - preE labs as above  - Mag: not indicated  - Hypertensive agent: not  indicated    Chlamydia  - positive at initial OB apt   - DOUGLAS neg    Kerri Ramirez MD/MPH  OB/GYN PGY4    Fellow attestation. Patient is a 23 y.o.  s/p  for PTL. Patient with vWF type 2. Doing well with no bleeding complications postpartum.     Temp:  [98.1 °F (36.7 °C)-99.4 °F (37.4 °C)] 98.1 °F (36.7 °C)  Pulse:  [100-106] 106  Resp:  [18] 18  SpO2:  [96 %-99 %] 96 %  BP: (127-139)/(72-86) 127/75    Abd: incision c/d/I, no bleeding or SOI    -continue humate and txa per hematology plan  -routine postpartum advances  -recommend continued admission through ppd#4 for humate administration inpatient    Ferdinand Sellers MD  PGY 7  Maternal Fetal Medicine  [facility]

## 2024-06-01 NOTE — PLAN OF CARE
Patient safety maintained, side rails up, bed low and locked position. Pt ambulating and voiding independently.  Pain well controlled with PRN pain medication. Fundus midline, firm, with moderate  lochia. Patient visiting baby in NICU. Incision site EVANGELINA; incision clean, dry, and intact.  No further needs at this time.

## 2024-06-01 NOTE — PLAN OF CARE
To follow lactation education. Continue to pump 8 or more times in 24 hours. Call PRN for assist/questions

## 2024-06-01 NOTE — LACTATION NOTE
06/01/24 1210   Maternal Assessment   Breast Shape Bilateral:;round   Breast Density Bilateral:;soft   Maternal Infant Feeding   Maternal Preparation hand hygiene   Maternal Emotional State relaxed;independent   Latch Assistance no   Equipment Type   Breast Pump Type double electric, hospital grade   Breast Pump Flange Type hard   Breast Pump Flange Size 24 mm   Breast Pumping   Breast Pumping Interventions frequent pumping encouraged   Breast Pumping other (see comments)  (offered assistance, cleint declined at this time, v/u to pump eveyr 2-3 hours)   Community Referrals   Community Referrals outpatient lactation program     LC to room: client eating lunch with family. Introduced self, reviewed pump schedule and LILO pumping book. Reviewed pump settings, cleaning, sanitizing, milk storage. All questions answered, client v/u, extension on whiteboard to call PRN for assist/questions. MBU RN updated.

## 2024-06-01 NOTE — NURSING
RN notified doctor that pt's vaginal bleeding was scant and asked if the nurses still needed to weight the pt's maxi pad.  The doctor said that the pt's maxi pads did not need to be weighed anymore, but if pt's vaginal bleeding increases, then the pads would have to be weighed.  RN also asked if the patient could have ibuprofen, the doctor said to hold off on giving pt the ibuprofen at the moment because of her Von Willebrand disease.

## 2024-06-02 PROCEDURE — 25000003 PHARM REV CODE 250

## 2024-06-02 PROCEDURE — 99233 SBSQ HOSP IP/OBS HIGH 50: CPT | Mod: ,,, | Performed by: INTERNAL MEDICINE

## 2024-06-02 PROCEDURE — 63600531 PHARM REV CODE 636 NO ALT 250 W HCPCS: Mod: JG

## 2024-06-02 PROCEDURE — 4A1HXCZ MONITORING OF PRODUCTS OF CONCEPTION, CARDIAC RATE, EXTERNAL APPROACH: ICD-10-PCS | Performed by: OBSTETRICS & GYNECOLOGY

## 2024-06-02 PROCEDURE — 99232 SBSQ HOSP IP/OBS MODERATE 35: CPT | Mod: ,,, | Performed by: OBSTETRICS & GYNECOLOGY

## 2024-06-02 PROCEDURE — 11000001 HC ACUTE MED/SURG PRIVATE ROOM

## 2024-06-02 RX ORDER — TALC
6 POWDER (GRAM) TOPICAL NIGHTLY PRN
Status: DISCONTINUED | OUTPATIENT
Start: 2024-06-02 | End: 2024-06-03 | Stop reason: HOSPADM

## 2024-06-02 RX ORDER — LIDOCAINE 50 MG/G
1 PATCH TOPICAL
Status: DISCONTINUED | OUTPATIENT
Start: 2024-06-02 | End: 2024-06-03 | Stop reason: HOSPADM

## 2024-06-02 RX ADMIN — ACETAMINOPHEN 650 MG: 325 TABLET, FILM COATED ORAL at 05:06

## 2024-06-02 RX ADMIN — OXYCODONE HYDROCHLORIDE 10 MG: 10 TABLET ORAL at 09:06

## 2024-06-02 RX ADMIN — ACETAMINOPHEN 650 MG: 325 TABLET, FILM COATED ORAL at 11:06

## 2024-06-02 RX ADMIN — PRENATAL VIT W/ FE FUMARATE-FA TAB 27-0.8 MG 1 TABLET: 27-0.8 TAB at 09:06

## 2024-06-02 RX ADMIN — OXYCODONE HYDROCHLORIDE 10 MG: 10 TABLET ORAL at 01:06

## 2024-06-02 RX ADMIN — OXYCODONE HYDROCHLORIDE 10 MG: 10 TABLET ORAL at 05:06

## 2024-06-02 RX ADMIN — TRANEXAMIC ACID 2000 MG: 100 INJECTION, SOLUTION INTRAVENOUS at 11:06

## 2024-06-02 RX ADMIN — DOCUSATE SODIUM 200 MG: 100 CAPSULE, LIQUID FILLED ORAL at 07:06

## 2024-06-02 RX ADMIN — Medication 1995 UNITS: at 11:06

## 2024-06-02 RX ADMIN — LIDOCAINE 1 PATCH: 50 PATCH TOPICAL at 12:06

## 2024-06-02 RX ADMIN — DOCUSATE SODIUM 200 MG: 100 CAPSULE, LIQUID FILLED ORAL at 09:06

## 2024-06-02 RX ADMIN — TRANEXAMIC ACID 2000 MG: 100 INJECTION, SOLUTION INTRAVENOUS at 07:06

## 2024-06-02 NOTE — ED PROVIDER NOTES
Encounter Date: 2024       History     Chief Complaint   Patient presents with    Vaginal Bleeding     HPI    Rosa Alvarez is a 23 y.o.  female with IUP at 32w1d weeks gestation who presents with vaginal bleeding. Patient reports about 30 minutes prior to arrival she had a gush of blood with clots. She could not quantify how blood it was since she was on the was on the toilet.      Patient reports contractions, reports vaginal bleeding, denies LOF.   Fetal Movement: normal.     This IUP is complicated by fetal malpresentation (breech) ,Von Willebrand Disease, short cervix, h/o chlamydia (1T, neg DOUGLAS).     Review of patient's allergies indicates:  No Known Allergies  Past Medical History:   Diagnosis Date    Hereditary factor VIII deficiency     Von Willebrand disease      Past Surgical History:   Procedure Laterality Date     SECTION N/A 2024    Procedure:  SECTION;  Surgeon: Gwendolyn Luevano MD;  Location: [facility];  Service: OB/GYN;  Laterality: N/A;     No family history on file.  Social History     Tobacco Use    Smoking status: Former     Current packs/day: 0.00     Types: Cigarettes, Vaping with nicotine     Quit date: 2023     Years since quittin.5    Smokeless tobacco: Never   Substance Use Topics    Alcohol use: Never    Drug use: Never     Review of Systems   Constitutional:  Negative for chills and fever.   HENT:  Negative for congestion and sore throat.    Eyes:  Negative for visual disturbance.   Respiratory:  Negative for shortness of breath.    Cardiovascular:  Negative for chest pain.   Gastrointestinal:  Positive for abdominal pain (contractions). Negative for constipation, diarrhea and nausea.   Genitourinary:  Positive for vaginal bleeding. Negative for dysuria, frequency and vaginal discharge.   Musculoskeletal:  Negative for back pain.   Skin:  Negative for rash.   Neurological:  Negative for weakness, light-headedness and headaches.   Hematological:   Does not bruise/bleed easily.   Psychiatric/Behavioral:  The patient is not nervous/anxious.        Physical Exam     Initial Vitals [05/30/24 2330]   BP Pulse Resp Temp SpO2   (!) 161/94 (!) 117 18 98.7 °F (37.1 °C) 100 %      MAP       --         Physical Exam    Vitals reviewed.  Constitutional: She appears well-developed and well-nourished. She is not diaphoretic. No distress.   HENT:   Head: Normocephalic and atraumatic.   Eyes: EOM are normal.   Neck: Neck supple.   Normal range of motion.  Cardiovascular:  Normal rate.           Pulmonary/Chest: No respiratory distress.   Abdominal: There is no abdominal tenderness. There is no guarding.   Musculoskeletal:      Cervical back: Normal range of motion and neck supple.     Neurological: She is alert and oriented to person, place, and time.   Skin: Skin is warm and dry.   Psychiatric: She has a normal mood and affect. Her behavior is normal. Thought content normal.     OB LABOR EXAM:         Vaginal Bleeding: bloody show.     Dilatation: 5.   Station: -1.   Effacement: 90%.             ED Course   Fetal non-stress test    Date/Time: 6/2/2024 6:37 PM    Performed by: Reyes Gomez MD  Authorized by: Reyes Gomez MD    Nonstress Test:     Variability:  6-25 BPM    Decelerations:  None    Accelerations:  15 bpm    Contractions:  Regular  Biophysical Profile:     Nonstress Test Interpretation: reactive      Overall Impression:  Reassuring    Labs Reviewed   CBC W/ AUTO DIFFERENTIAL - Abnormal; Notable for the following components:       Result Value    WBC 14.33 (*)     MPV 8.5 (*)     Immature Granulocytes 1.1 (*)     Gran # (ANC) 11.1 (*)     Immature Grans (Abs) 0.16 (*)     Mono # 1.1 (*)     Gran % 77.2 (*)     Lymph % 12.9 (*)     All other components within normal limits          Imaging Results    None          Medications   0.9%  NaCl infusion (for blood administration) (has no administration in time range)   0.9%  NaCl infusion (for blood  administration) (has no administration in time range)   azithromycin (ZITHROMAX) 500 mg injection (has no administration in time range)   miSOPROStoL tablet 800 mcg (has no administration in time range)   miSOPROStoL tablet 800 mcg (has no administration in time range)   methylergonovine injection 200 mcg (has no administration in time range)   carboprost injection 250 mcg (has no administration in time range)   tranexamic acid in NaCl,iso-os IVPB 1,000 mg (has no administration in time range)   acetaminophen tablet 650 mg (650 mg Oral Given 6/3/24 0808)   diphenhydrAMINE injection 12.5 mg (has no administration in time range)   diphenhydrAMINE capsule 25 mg (has no administration in time range)   ondansetron injection 4 mg (has no administration in time range)   ondansetron disintegrating tablet 8 mg (has no administration in time range)   prochlorperazine injection Soln 5 mg (has no administration in time range)   measles, mumps and rubella vaccine 1,000-12,500 TCID50/0.5 mL injection 0.5 mL (has no administration in time range)   Tdap (BOOSTRIX) vaccine injection 0.5 mL (has no administration in time range)   senna-docusate 8.6-50 mg per tablet 1 tablet (has no administration in time range)   simethicone chewable tablet 80 mg (has no administration in time range)   prenatal vitamin oral tablet (1 tablet Oral Given 6/3/24 0808)   docusate sodium capsule 200 mg (200 mg Oral Given 6/3/24 0808)   lanolin cream (has no administration in time range)   hydrocortisone 2.5 % rectal cream (has no administration in time range)   sodium chloride 0.9% flush 10 mL (has no administration in time range)   tranexamic acid (CYKLOKAPRON) 2,000 mg in sodium chloride 0.9% 100 mL (2,000 mg Intravenous Not Given 6/3/24 0330)   antihemophilic factor-vWF (HUMATE-P) injection 1,995 Units (1,995 Units Intravenous Not Given 6/3/24 0031)   ibuprofen tablet 600 mg (600 mg Oral Not Given 6/2/24 1800)   oxyCODONE immediate release tablet 5 mg  (5 mg Oral Given 24 1645)   oxyCODONE immediate release tablet Tab 10 mg (10 mg Oral Given 6/3/24 0108)   LIDOcaine HCL 10 mg/ml (1%) injection 5 mL (has no administration in time range)   LIDOcaine 5 % patch 1 patch (1 patch Transdermal Patch Removed 6/3/24 0055)   melatonin tablet 6 mg (has no administration in time range)   betamethasone acetate-betamethasone sodium phosphate injection 12 mg (12 mg Intramuscular Given 24)   sodium citrate-citric acid 500-334 mg/5 ml solution 30 mL (30 mLs Oral Given 24 224)   famotidine (PF) injection 20 mg (20 mg Intravenous Given 24 224)   antihemophilic factor-vWF (HUMATE-P) injection 3,990 Units (3,990 Units Intravenous Given 24 0020)   azithromycin (ZITHROMAX) 500 mg in dextrose 5 % (D5W) 250 mL IVPB (Vial-Mate) (500 mg Intravenous New Bag 24)     Medical Decision Making   Labor  - SSE: 30 cc clot removed from posterior vaginal vault. No active bleeding.   - SVE: /-1 with BBOW  - BMZ dose #1 given in POLLY.   - BSUS with breech presentation  - Risks, benefits, alternatives and possible complications have been discussed in detail with the patient.   - Consents signed and to chart  - Admit to Labor and Delivery unit  - Spinal per Anesthesia  - CBC, T&S, vWF assay, coags collected on admit  - Notified staff, anesthesia, and heme oncology team.          Amount and/or Complexity of Data Reviewed  Labs: ordered.    Risk  Prescription drug management.              Attending Attestation:   Physician Attestation Statement for Resident:  As the supervising MD   Physician Attestation Statement: I have personally seen and examined this patient.   I agree with the above history.  -:   As the supervising MD I agree with the above PE.     As the supervising MD I agree with the above treatment, course, plan, and disposition.   -: NST reactive and reassuring, toco with regular contractions.  Exam with moderate VB and c/w labor.  Will admit  to L&D for delivery.  Patient with Von Willebrand's Type 2.  MFM consulted.  T&C for 2 units.   I was personally present during the critical portions of the procedure(s) performed by the resident and was immediately available in the ED to provide services and assistance as needed during the entire procedure.   I have reviewed the following: old records at this facility.                                       Clinical Impression:  Final diagnoses:  [O60.14X0]  labor in third trimester with  delivery, single or unspecified fetus  [O60.00]  labor  [O46.90] Vaginal bleeding in pregnancy (Primary)  [Z3A.32] 32 weeks gestation of pregnancy  [D68.00] Von Willebrand's disease          ED Disposition Condition    Send to L&D Reyes Banks MD  Resident  [phone]       Oanh Galan MD  [phone]

## 2024-06-02 NOTE — NURSING
Nurse at bedside @ 0141 to administer tranexamic acid. Upon administration, IV infiltrated and removed. Anesthesia called to place IV. New IV placed and infusion administered @ 0300.

## 2024-06-02 NOTE — PROGRESS NOTES
POSTPARTUM PROGRESS NOTE    Subjective:     PPD/POD#: 3   Procedure: Primary LTCS for fetal malpresentation and vWF with unknown assay levels (GETA)   EGA: 32w1d   N/V: No   F/C: No   Abd Pain: Mild, well-controlled with oral pain medication   Lochia: Mild   Voiding: Yes   Ambulating: Yes   Bowel fnc: Yes   Contraception: Nexplanon to be placed prior to discharge     Reports doing well over night, but unable to sleep.  Reports pain well controlled.  Ambulating to restroom w/o issue.  Tolerating PO w/o issue.  Passing flatus but no BM.  Has seen baby in NICU several times and is doing well. No other concerns at this time.    Objective:      Temp:  [98.1 °F (36.7 °C)-98.6 °F (37 °C)] 98.6 °F (37 °C)  Pulse:  [] 90  Resp:  [17-18] 17  SpO2:  [96 %-98 %] 98 %  BP: (127-135)/(72-82) 131/72    Abdomen: Soft, appropriately tender   Uterus: Firm, no fundal tenderness   Incision: Bandage in place without shadowing     Lab Review    Recent Labs   Lab 05/30/24 2113      K 3.7      CO2 19*   BUN 6   CREATININE 0.6      PROT 7.1   BILITOT 0.3   ALKPHOS 108   ALT 19   AST 20     Recent Labs   Lab 05/30/24 2113 05/30/24  2252 05/30/24 2256 06/01/24  0425   WBC 14.33*  --   --  17.81*   HGB 13.3  --   --  11.6*   HCT 39.2 41 41 35.7*   MCV 87  --   --  89     --   --  299     I/O  No intake or output data in the 24 hours ending 06/02/24 0503    Assessment and Plan:   Postpartum care:  - Patient doing well  - Continue routine management and advances  - PRN melatonin for sleep    Von Willebrand Disease  - follows closely by SONI (Dr. Yates), Dr. Young (her hematologist), and Dr. Magaña ([facility])  - presumed Type 2M vWD   - vW factor activity 84% on 4/8/24, repeat obtained on admit  -Recommendations per Heme/Onc  - postpartum: Humate-P 50 units/kilogram following delivery then 25 units/kilogram every 12-24 hours for 3-5 days. Tranexamic acid 25 milligrams/kilogram orally every 6-8 hours  for 5 days.     Obesity  - BMI 33  - Encourage ambulation  - Lovenox: not indicated    gHTN  - BP as above, no severe range BP  - asymptomatic  - preE labs as above  - Mag: not indicated  - Hypertensive agent: not indicated    Chlamydia  - positive at initial OB apt   - DOUGLAS neg      Pasha Araiza MD MS  OB/Gyn  PGY-1    PPD#2 s/p primary  2/2 breech presentation. Pregnancy affected by vWF disease. Doing well postpartum. Meeting all milestones.    Temp:  [98.4 °F (36.9 °C)-98.6 °F (37 °C)] 98.6 °F (37 °C)  Pulse:  [] 90  Resp:  [17-18] 17  SpO2:  [97 %-98 %] 98 %  BP: (131-135)/(72-82) 131/72    -continue humate and txa per hematology plan  -routine postpartum advances  -recommend continued admission through ppd#3 for humate administration inpatient, follow up with hematology tomorrow to determine need for continued humate through day 5 pp.     Ferdinand Sellers MD  PGY 7  Maternal Fetal Medicine  [facility]

## 2024-06-02 NOTE — LACTATION NOTE
06/02/24 0820   Maternal Assessment   Breast Density Bilateral:;soft   Maternal Infant Feeding   Maternal Preparation hand hygiene   Maternal Emotional State relaxed;independent   Latch Assistance no   Equipment Type   Breast Pump Type double electric, hospital grade   Breast Pump Flange Type hard   Breast Pumping   Breast Pumping Interventions frequent pumping encouraged   Community Referrals   Community Referrals outpatient lactation program     LC to room: client stated pumping every 2-3 hours, starting to produce more drops. Cleaning and sterilization reviewed, all questions answered. [facility] clinical protocol #13 printed and provided per client's request for more information on contraceptive use during lactation/postpartum. Extension on whiteboard, all questions answered, v/u how to call PRN. MBU RN updated

## 2024-06-03 ENCOUNTER — TELEPHONE (OUTPATIENT)
Dept: OBSTETRICS AND GYNECOLOGY | Facility: CLINIC | Age: 24
End: 2024-06-03
Payer: MEDICAID

## 2024-06-03 ENCOUNTER — PATIENT MESSAGE (OUTPATIENT)
Dept: LACTATION | Facility: CLINIC | Age: 24
End: 2024-06-03
Payer: MEDICAID

## 2024-06-03 VITALS
BODY MASS INDEX: 33.24 KG/M2 | DIASTOLIC BLOOD PRESSURE: 84 MMHG | RESPIRATION RATE: 18 BRPM | SYSTOLIC BLOOD PRESSURE: 131 MMHG | OXYGEN SATURATION: 99 % | WEIGHT: 175.94 LBS | HEART RATE: 102 BPM | TEMPERATURE: 98 F

## 2024-06-03 LAB
BLD PROD TYP BPU: NORMAL
BLOOD UNIT EXPIRATION DATE: NORMAL
BLOOD UNIT TYPE CODE: 7300
BLOOD UNIT TYPE: NORMAL
CODING SYSTEM: NORMAL
CROSSMATCH INTERPRETATION: NORMAL
DISPENSE STATUS: NORMAL
NUM UNITS TRANS PACKED RBC: NORMAL

## 2024-06-03 PROCEDURE — 63600531 PHARM REV CODE 636 NO ALT 250 W HCPCS: Mod: JZ,JG

## 2024-06-03 PROCEDURE — 25000003 PHARM REV CODE 250

## 2024-06-03 PROCEDURE — 99239 HOSP IP/OBS DSCHRG MGMT >30: CPT | Mod: ,,, | Performed by: OBSTETRICS & GYNECOLOGY

## 2024-06-03 RX ORDER — TRANEXAMIC ACID 650 MG/1
2000 TABLET ORAL EVERY 8 HOURS
Qty: 18 TABLET | Refills: 0 | Status: SHIPPED | OUTPATIENT
Start: 2024-06-03 | End: 2024-06-07

## 2024-06-03 RX ORDER — TRANEXAMIC ACID 650 MG/1
2000 TABLET ORAL EVERY 8 HOURS
Qty: 18 TABLET | Refills: 0 | Status: SHIPPED | OUTPATIENT
Start: 2024-06-03 | End: 2024-06-03

## 2024-06-03 RX ORDER — OXYCODONE HYDROCHLORIDE 5 MG/1
5 TABLET ORAL EVERY 4 HOURS PRN
Qty: 10 TABLET | Refills: 0 | Status: SHIPPED | OUTPATIENT
Start: 2024-06-03 | End: 2024-06-03

## 2024-06-03 RX ORDER — DOCUSATE SODIUM 100 MG/1
200 CAPSULE, LIQUID FILLED ORAL 2 TIMES DAILY
Qty: 60 CAPSULE | Refills: 2 | Status: SHIPPED | OUTPATIENT
Start: 2024-06-03

## 2024-06-03 RX ORDER — IBUPROFEN 600 MG/1
600 TABLET ORAL EVERY 6 HOURS PRN
Qty: 32 TABLET | Refills: 0 | Status: SHIPPED | OUTPATIENT
Start: 2024-06-03 | End: 2024-06-03

## 2024-06-03 RX ORDER — ACETAMINOPHEN 325 MG/1
650 TABLET ORAL EVERY 6 HOURS PRN
Qty: 32 TABLET | Refills: 0 | Status: SHIPPED | OUTPATIENT
Start: 2024-06-03 | End: 2024-06-03

## 2024-06-03 RX ORDER — OXYCODONE HYDROCHLORIDE 5 MG/1
5 TABLET ORAL EVERY 4 HOURS PRN
Qty: 10 TABLET | Refills: 0 | Status: SHIPPED | OUTPATIENT
Start: 2024-06-03

## 2024-06-03 RX ORDER — IBUPROFEN 600 MG/1
600 TABLET ORAL EVERY 6 HOURS PRN
Qty: 32 TABLET | Refills: 0 | Status: SHIPPED | OUTPATIENT
Start: 2024-06-03

## 2024-06-03 RX ORDER — DOCUSATE SODIUM 100 MG/1
200 CAPSULE, LIQUID FILLED ORAL 2 TIMES DAILY
Qty: 60 CAPSULE | Refills: 2 | Status: SHIPPED | OUTPATIENT
Start: 2024-06-03 | End: 2024-06-03

## 2024-06-03 RX ORDER — ACETAMINOPHEN 325 MG/1
650 TABLET ORAL EVERY 6 HOURS PRN
Qty: 32 TABLET | Refills: 0 | Status: SHIPPED | OUTPATIENT
Start: 2024-06-03

## 2024-06-03 RX ADMIN — PRENATAL VIT W/ FE FUMARATE-FA TAB 27-0.8 MG 1 TABLET: 27-0.8 TAB at 08:06

## 2024-06-03 RX ADMIN — DOCUSATE SODIUM 200 MG: 100 CAPSULE, LIQUID FILLED ORAL at 08:06

## 2024-06-03 RX ADMIN — Medication 1995 UNITS: at 12:06

## 2024-06-03 RX ADMIN — ACETAMINOPHEN 650 MG: 325 TABLET, FILM COATED ORAL at 01:06

## 2024-06-03 RX ADMIN — ACETAMINOPHEN 650 MG: 325 TABLET, FILM COATED ORAL at 12:06

## 2024-06-03 RX ADMIN — OXYCODONE HYDROCHLORIDE 10 MG: 10 TABLET ORAL at 01:06

## 2024-06-03 RX ADMIN — ACETAMINOPHEN 650 MG: 325 TABLET, FILM COATED ORAL at 08:06

## 2024-06-03 RX ADMIN — ACETAMINOPHEN 650 MG: 325 TABLET, FILM COATED ORAL at 02:06

## 2024-06-03 NOTE — LACTATION NOTE
This note was copied from a baby's chart.  Lactation Note: Met mother at bedside; Introduced self. Discussed the importance of frequent pumping in first two weeks to establish a full breast milk supply. Encouraged pumping 8 or more times in 24 hours and skin to skin care when baby able. Discussed pumping every 2-3 hours with only one 5-hour break without pumping for sleep. Recommended pumping schedule discussed. Pumping supplies at bedside. Benefits of breast milk for baby and benefits of providing breast milk for mother discussed. Mother rented [employer] breast pump. Encouragement and support offered to mom.   Megha Ward, BSN, RNC, CLC, IBCLC

## 2024-06-03 NOTE — PROGRESS NOTES
[facility] Progress Note    Consult Requested By: OB   Reason for Consult: vWD    SUBJECTIVE:   Admit date: 2024  History of Present Illness: Rosa Alvarez is a 23 y.o. female w/ vWD likely type 2A    Patient presented with  labor. Delivered at 32w via csection on 24    Patient has been followed by Dr Young from hematology, last seen 4/3/24 (see note on that date for further details)    No excessive bleeding at surgery, and now only minimal spotting,     Has been started on Humate P and Tranexemic acid, currently day 3    Review of Systems  14-point review of systems was asked with the pertinent positives and/or negatives stated in HPI.     PTA Medications   Medication Sig    ferrous sulfate 325 (65 FE) MG EC tablet Take 1 tablet (325 mg total) by mouth 4 (four) times a week.    prenatal vit/iron fum/folic ac (PRENATAL 1+1 ORAL) Take by mouth.    progesterone (PROMETRIUM) 200 MG capsule Place 1 capsule (200 mg total) vaginally nightly.      Review of patient's allergies indicates:  No Known Allergies    Past Medical History:   Diagnosis Date    Hereditary factor VIII deficiency     Von Willebrand disease        Past Surgical History:   Procedure Laterality Date     SECTION N/A 2024    Procedure:  SECTION;  Surgeon: Gwendolyn Luevano MD;  Location: [facility];  Service: OB/GYN;  Laterality: N/A;      No family history on file.    Social History     Tobacco Use    Smoking status: Former     Current packs/day: 0.00     Types: Cigarettes, Vaping with nicotine     Quit date: 2023     Years since quittin.5    Smokeless tobacco: Never   Substance Use Topics    Alcohol use: Never    Drug use: Never        OBJECTIVE:     Vital Signs (Most Recent)   Temp: 97.8 °F (36.6 °C) (24)  Pulse: 93 (24 165)  Resp: 18 (24 1728)  BP: 121/77 (24)  SpO2: 98 % (24)  Body mass index is 33.24 kg/m².      Physical Exam:  Physical Exam  General  "Appearance:    Alert, cooperative, no distress, appears stated age   Head:    Normocephalic, without obvious abnormality, atraumatic   Throat:   Lips, mucosa, and tongue normal; teeth and gums normal   Neck:   Supple, symmetrical, no adenopathy;   Lungs:     Clear to auscultation bilaterally, respirations unlabored    Heart:    Regular rate and rhythm, S1 and S2 normal   Abdomen:     Soft, non-tender, bowel sounds active, no masses, no organomegaly   Extremities:   Extremities normal, atraumatic, no cyanosis or edema   Pulses:   2+ and symmetric all extremities   Skin:   Skin color, texture, turgor normal, no rashes or lesions   Lymph nodes:   Cervical, supraclavicular, and axillary nodes normal   Neurologic:   normal strength, sensation         Cardiac Enzymes: Ejection Fractions:    No results for input(s): "CPK", "CPKMB", "MB", "TROPONINI" in the last 72 hours. No results found for: "EF"     Chemistries:   Recent Labs   Lab 05/30/24 2113      K 3.7      CO2 19*   BUN 6   CREATININE 0.6   CALCIUM 9.6   PROT 7.1   BILITOT 0.3   ALKPHOS 108   ALT 19   AST 20        CBC/Anemia Labs: Coags:    Recent Labs   Lab 05/30/24 2113 05/30/24 2252 05/30/24 2256 06/01/24  0425   WBC 14.33*  --   --  17.81*   HGB 13.3  --   --  11.6*   HCT 39.2 41 41 35.7*     --   --  299   MCV 87  --   --  89   RDW 12.9  --   --  12.9    Recent Labs   Lab 05/30/24 2110   INR 0.9   APTT 28.3        POCT Glucose: HbA1c:    No results for input(s): "POCTGLUCOSE" in the last 168 hours. No results found for: "HGBA1C"     Lines/Drains:       Peripheral IV - Single Lumen 05/30/24 2145 18 G Left;Posterior Hand (Active)   Site Assessment Clean;Dry;Intact;No redness;No swelling 05/31/24 0235   Extremity Assessment Distal to IV No abnormal discoloration;No redness;No swelling;No warmth 05/31/24 0235   Line Status Infusing 05/31/24 0235   Dressing Status Clean;Dry;Intact 05/31/24 0235   Dressing Intervention Integrity maintained " "24   Reason Not Rotated Not due 24 2330   Number of days: 0            Urethral Catheter 24 2226 Non-latex;Straight-tip 16 Fr. (Active)   $ العراقي Insertion Bedside Insertion Performed 24   Site Assessment Clean;Intact 24   Collection Container Urimeter 24   Securement Method secured to top of thigh w/ adhesive device 24   Catheter Care Performed yes 24   Reason for Continuing Urinary Catheterization Required immobilization 24   CAUTI Prevention Bundle Securement Device in place with 1" slack;Intact seal between catheter & drainage tubing;Drainage bag/urimeter off the floor;No dependent loops or kinks;Drainage bag/urimeter not overfilled (<2/3 full);Drainage bag/urimeter below bladder 24   Output (mL) 120 mL 24 0600   Number of days: 0         ASSESSMENT/PLAN:     Active Hospital Problems    Diagnosis  POA    *S/P  section [Z98.891]  Not Applicable    Gestational hypertension without significant proteinuria, postpartum [O13.5]  No     labor [O60.00]  Yes    Von Willebrand disease [D68.00]  Yes      Resolved Hospital Problems   No resolved problems to display.       23 y.o. female  vWD likely type 2A, s/p  on  for pre-term labor/breech presentation. No evidence of excessive bleeding.     Recommendations:   - Continue Humate-P 50 units/kilogram following delivery then 25 units/kilogram every 12-24 hours for 3-5 days.    - Continue Tranexamic acid 25 milligrams/kilogram orally every 6-8 hours for 5 days.    Please page Hematology for any questions.   Signed,  Tanesha Fry MD  Medical Oncology     "

## 2024-06-03 NOTE — CARE UPDATE
MD called patient to inform her of her discharge medications.    No response. No voicemail box available.    Sent medications to preferred pharmacy in chart.    Johnny Flores MD PGY-2  Obstetrics and Gynecology

## 2024-06-03 NOTE — NURSING
The following message was sent to [facility]:   Hi, can you please call ms gil to set up a post partum bp/mood check appt on or before Monday 6/10. Thank you . Pt was dx w/gest htn and does NOT have a bp cuff at home, infant remains in NICU

## 2024-06-03 NOTE — LACTATION NOTE
06/03/24 1000   Equipment Type   Breast Pump Type double electric, hospital grade   Breast Pump Flange Type hard   Breast Pump Flange Size 24 mm   Breast Pumping   Breast Pumping Interventions frequent pumping encouraged;early pumping promoted   Breast Pumping other (see comments)  (encouraged to pump 8x/day)   Community Referrals   Community Referrals WIC (women, infants and children) program;support group;pediatric care provider;outpatient lactation program     Discharge lactation education reviewed with NICU pumping guide. Encouraged to pump 8x/day for baby in NICU. Patient unsure if she wants to rent [facility], has to talk to her mom today.   Fees written and provided for patient.  Otherwise, she has a wearable pump that was purchased, reviewed and discussed recommendations for using wearable pumps at this time. Encouraged to order her pump from insurance- Medicaid-[facility] may cover a Motif MAKEDA from [employer], recommend she rent until it arrives.     LC number on board to call if needing a rental for discharge.     Otherwise, she has a wearable pump and the manual [place] provided to her in hospital to use. Reviewed all pump education including washing,sterilizing, milk storage/ handling and community resources for ongoing support.

## 2024-06-03 NOTE — NURSING
Pt waiting on pharm to deliver home meds to bedside. Pt told RN she was going down to NICU to speak to baby's Dr and would be back up after. Pharm went to NICU to deliver meds, and pt not there. RN called pt, and pt did not answer. Pt's room empty of pt's belongings. MD notified that pt left without rx's. All other discharge criteria was met. AVS discussed with pt.

## 2024-06-03 NOTE — NURSING
Problem: PAIN - ADULT  Goal: Verbalizes/displays adequate comfort level or baseline comfort level  Description: Interventions:  - Encourage patient to monitor pain and request assistance  - Assess pain using appropriate pain scale  - Administer analgesics based on type and severity of pain and evaluate response  - Implement non-pharmacological measures as appropriate and evaluate response  - Consider cultural and social influences on pain and pain management  - Notify physician/advanced practitioner if interventions unsuccessful or patient reports new pain  Outcome: Progressing     Problem: INFECTION - ADULT  Goal: Absence or prevention of progression during hospitalization  Description: INTERVENTIONS:  - Assess and monitor for signs and symptoms of infection  - Monitor lab/diagnostic results  - Monitor all insertion sites, i.e. indwelling lines, tubes, and drains  - Monitor endotracheal if appropriate and nasal secretions for changes in amount and color  - Enoree appropriate cooling/warming therapies per order  - Administer medications as ordered  - Instruct and encourage patient and family to use good hand hygiene technique  - Identify and instruct in appropriate isolation precautions for identified infection/condition  Outcome: Progressing  Goal: Absence of fever/infection during neutropenic period  Description: INTERVENTIONS:  - Monitor WBC    Outcome: Progressing     Problem: SAFETY ADULT  Goal: Patient will remain free of falls  Description: INTERVENTIONS:  - Educate patient/family on patient safety including physical limitations  - Instruct patient to call for assistance with activity   - Consult OT/PT to assist with strengthening/mobility   - Keep Call bell within reach  - Keep bed low and locked with side rails adjusted as appropriate  - Keep care items and personal belongings within reach  - Initiate and maintain comfort rounds  - Make Fall Risk Sign visible to staff  - Offer Toileting every  Hours,  RN to bedside to give pt Humate. RN attempted to flush IV to check for patency and resistance was met. Pt stated that this was her 4th IV and didn't want to be stuck again. IV was removed and RN called MD Beckman @0050 and explained the situation and notified MD that pt's bleeding has been light throughout her stay. MD stated that she will ask her upper. MD Spears reported to pt's bedside @0115 and told RN that it is okay for pt not to receive Humate or the next dose of TXA and she will leave for the day team to assess.    in advance of need  - Initiate/Maintain alarm  - Obtain necessary fall risk management equipment:   - Apply yellow socks and bracelet for high fall risk patients  - Consider moving patient to room near nurses station  Outcome: Progressing  Goal: Maintain or return to baseline ADL function  Description: INTERVENTIONS:  -  Assess patient's ability to carry out ADLs; assess patient's baseline for ADL function and identify physical deficits which impact ability to perform ADLs (bathing, care of mouth/teeth, toileting, grooming, dressing, etc.)  - Assess/evaluate cause of self-care deficits   - Assess range of motion  - Assess patient's mobility; develop plan if impaired  - Assess patient's need for assistive devices and provide as appropriate  - Encourage maximum independence but intervene and supervise when necessary  - Involve family in performance of ADLs  - Assess for home care needs following discharge   - Consider OT consult to assist with ADL evaluation and planning for discharge  - Provide patient education as appropriate  Outcome: Progressing  Goal: Maintains/Returns to pre admission functional level  Description: INTERVENTIONS:  - Perform AM-PAC 6 Click Basic Mobility/ Daily Activity assessment daily.  - Set and communicate daily mobility goal to care team and patient/family/caregiver.   - Collaborate with rehabilitation services on mobility goals if consulted  - Perform Range of Motion  times a day.  - Reposition patient every  hours.  - Dangle patient  times a day  - Stand patient  times a day  - Ambulate patient  times a day  - Out of bed to chair  times a day   - Out of bed for meals times a day  - Out of bed for toileting  - Record patient progress and toleration of activity level   Outcome: Progressing     Problem: DISCHARGE PLANNING  Goal: Discharge to home or other facility with appropriate resources  Description: INTERVENTIONS:  - Identify barriers to discharge w/patient and caregiver  - Arrange for  needed discharge resources and transportation as appropriate  - Identify discharge learning needs (meds, wound care, etc.)  - Arrange for interpretive services to assist at discharge as needed  - Refer to Case Management Department for coordinating discharge planning if the patient needs post-hospital services based on physician/advanced practitioner order or complex needs related to functional status, cognitive ability, or social support system  Outcome: Progressing     Problem: Knowledge Deficit  Goal: Patient/family/caregiver demonstrates understanding of disease process, treatment plan, medications, and discharge instructions  Description: Complete learning assessment and assess knowledge base.  Interventions:  - Provide teaching at level of understanding  - Provide teaching via preferred learning methods  Outcome: Progressing

## 2024-06-03 NOTE — DISCHARGE SUMMARY
Delivery Discharge Summary  Obstetrics      Primary OB Clinician: Danny Flores III, *     Admission date: 2024  Discharge date: 2024    Disposition: To home, self care    Discharge Diagnosis List:      Patient Active Problem List   Diagnosis    Coagulation defect affecting pregnancy, antepartum    Short cervix during pregnancy in second trimester    Premature cervical dilation in third trimester    Von Willebrand disease    History of chlamydia     labor    Gestational hypertension without significant proteinuria, postpartum    S/P  section       Procedure: , due to breech presentation and vaginal bleeding    Hospital Course:  Rosa Alvarez is a 23 y.o. now , POD #4 who was admitted on 2024 at 32w1d for vaginal bleeding. Patient was noted to be in breech presentation, therefore decision was made to proceed with an urgent pLTCS. Patient was subsequently admitted to labor and delivery unit with signed consents.     Patient was taken to the OR and was placed under general anesthesia. A pLTCS was performed without complications.     Please see delivery note for further details. Her postpartum course was complicated by von Willebrands disease. Patient received Humate-P 50 units/kilogram following delivery then 25 units/kilogram every 12-24 hours for 3 days. Patient also received Tranexamic acid 25 milligrams/kilogram orally every 6-8 hours for 3 days. Patient will be discharge on oral TXA for a total of 7 days. Patient met criteria for gHTN, however did not require initiation of any antihypertensive agents this admission. On discharge day, patient's pain is controlled with oral pain medications. Pt is tolerating ambulation without SOB or CP, and regular diet without N/V. Reports lochia is mild. Denies any HA, vision changes, F/C, LE swelling. Denies any breast pain/soreness.    Pt in stable condition and ready for discharge. She has been instructed to start and/or  continue medications and follow up with her obstetrics provider as listed below.    Patient to follow up with her primary OBGYN at 6 weeks for a postpartum appointment. Patient does desire a Nexplanon at that time.    Pertinent studies:  CBC  Recent Labs   Lab 24  0425   WBC 14.33*  --   --  17.81*   HGB 13.3  --   --  11.6*   HCT 39.2 41 41 35.7*   MCV 87  --   --  89     --   --  299        Immunization History   Administered Date(s) Administered    Tdap 2024        Delivery:    Episiotomy:     Lacerations:     Repair suture:     Repair # of packets:     Blood loss (ml):       Birth information:  YOB: 2022   Time of birth: 10:37 PM   Sex: male   Delivery type: , Low Transverse   Gestational Age: 32w1d     Measurements    Weight:   Length:          Delivery Clinician: Delivery Providers    Delivering clinician: Gwendolyn Luevano MD   Provider Role    Carolyn Spears MD Resident    Patience Deleon RN Circulator    Patricia Palmer ST Scrub Person    Ninfa Radford RN Charge Nurse             Additional  information:  Forceps:    Vacuum:    Breech:    Observed anomalies      Living?:     Apgars    Living status: Living  Apgar Component Scores:  1 min.:  5 min.:  10 min.:  15 min.:  20 min.:    Skin color:  0  1       Heart rate:  1  2       Reflex irritability:  0  2       Muscle tone:  1  1       Respiratory effort:  0  2       Total:  2  8       Apgars assigned by: NICU         Placenta: Delivered:       appearance    Patient Instructions:   Current Discharge Medication List        START taking these medications    Details   acetaminophen (TYLENOL) 325 MG tablet Take 2 tablets (650 mg total) by mouth every 6 (six) hours as needed for Pain.  Qty: 32 tablet, Refills: 0      docusate sodium (COLACE) 100 MG capsule Take 2 capsules (200 mg total) by mouth 2 (two) times daily.  Qty: 60 capsule, Refills: 2      ibuprofen  (ADVIL,MOTRIN) 600 MG tablet Take 1 tablet (600 mg total) by mouth every 6 (six) hours as needed for Pain.  Qty: 32 tablet, Refills: 0      oxyCODONE (ROXICODONE) 5 MG immediate release tablet Take 1 tablet (5 mg total) by mouth every 4 (four) hours as needed for Pain.  Qty: 10 tablet, Refills: 0    Comments: Quantity prescribed more than 7 day supply? No      tranexamic acid (LYSTEDA) 650 mg tablet Take 3 tablets (1,950 mg total) by mouth every 8 (eight) hours. for 2 days  Qty: 18 tablet, Refills: 0           CONTINUE these medications which have NOT CHANGED    Details   ferrous sulfate 325 (65 FE) MG EC tablet Take 1 tablet (325 mg total) by mouth 4 (four) times a week.  Qty: 48 tablet, Refills: 3    Associated Diagnoses: Supervision of high risk pregnancy, antepartum      prenatal vit/iron fum/folic ac (PRENATAL 1+1 ORAL) Take by mouth.           STOP taking these medications       progesterone (PROMETRIUM) 200 MG capsule Comments:   Reason for Stopping:               Discharge Procedure Orders   BREAST PUMP FOR HOME USE     Order Specific Question Answer Comments   Type of pump: Electric    Weight: 79.8 kg (175 lb 14.8 oz)    Length of need (1-99 months): 99      Pelvic Rest   Order Comments: For 6 weeks postpartum     Lifting restrictions   Order Comments: Nothing heavier than a jug of milk     Notify your health care provider if you experience any of the following:  temperature >100.4     Notify your health care provider if you experience any of the following:  persistent nausea and vomiting or diarrhea     Notify your health care provider if you experience any of the following:  severe uncontrolled pain     Notify your health care provider if you experience any of the following:   Order Comments: Heavy vaginal bleeding saturating more than pad an hour for more than an hour     Activity as tolerated        Lori Alvarez MD  Obstetrics and Gynecology - PGY1

## 2024-06-03 NOTE — TELEPHONE ENCOUNTER
----- Message from Melissa Norwood RN sent at 6/3/2024 10:46 AM CDT -----  Hi, can you please call ms gil to set up a post partum bp/mood check appt on or before Monday 6/10. Thank you . Pt was dx w/gest htn and does NOT have a bp cuff at home, infant remains in NICU

## 2024-06-03 NOTE — DISCHARGE INSTRUCTIONS
Breast Feeding Support Groups:   La Leche League: for breast feeding support meetings and help for mothers by phone and in person http://www.[facility] Hospitalsla.org    YAEL BabyCafé:  [email or web address] or      504517-MILK   Café au Lait:/ Café con Leche: valentin de lactancia  maternaeh Español  [email or web address]/groups/[email or web address]/   [phone], [email or web address]      Outpatient Lactation Help: ([facility])   YAEL BabyCafe    281-191-[phone]   [email or web address]     YAEL Milk Mom    Geeta Alamo [phone]   [email or web address]   [employer]    [phone]   [email or web address] | VIP   YAEL Nesting    [phone]; [address] [facility]    [email or web address]-support/   [facility]:    [address] [place] [address], [address], [facility] [phone]   www.[email or web address]      Outpatient Lactation Help: ([facility])   [facility] Lactation Care    ALYSON Trujillo  (Also serves [facility])  [email or web address]   Milk Drops DHARMESH Altamirano    [email or web address].org   Email: [email or web address]   [place] Lactation    Julia [phone]   [email or web address]      [facility] Breastfeeding Support:    Pacify Jaime - free breastfeeding support 24/7      Internet Resources:   www.[employer]   physicianguidetobreastfeeding.org   lacted.org   www.ameda.com   www.coffective.com   www.louisianabreastfeeding.org   www.maymom.com (for pumping supplies)       Additional Resources:    [facility] Helpline:    [phone] Monday-Friday 8am-5pm CST (for questions regarding medication safety and breast feeding)    Jaime: Malik   LactMed (medications and breast feeding) Free Jaime   Department of Healths National Breast-Feeding Helpline:  1-996.660.6110      Examples of Phone Apps to help track Babys Feeds or Pumping:       Sanju  Baby Tracker     Pumping Work    [facility]  Feed [facility] Breast Feeding  Centers/Lactation Consultants:   [facility] [phone]   [facility] [phone]   [facility]   [phone]   [facility] [phone]   [facility]    [phone]   [facility] ([place]) [phone]    [facility] ([place])                [phone]   [facility]   [phone]   [facility] ([place])   [phone]

## 2024-06-03 NOTE — PLAN OF CARE
06/03/24 1457   Final Note   Assessment Type Final Discharge Note   Anticipated Discharge Disposition Home   Post-Acute Status   Discharge Delays None known at this time

## 2024-06-03 NOTE — TELEPHONE ENCOUNTER
----- Message from Melissa Norwood RN sent at 6/3/2024 10:48 AM CDT -----  Hi, can you please inform dr ellison that ms gil was dx w/ gest htn , a message to our [place] [facility] at [facility] to schedule a bp check appt in 1 week, pt states it would be easy to visit infant and have a bp check appt in NO, if dr ellison would also like to see her can you reach out to pt  to schedule and please call to set up her 4-6week PP appt as well. Thank you  (pt states she does not have a bp cuff at home)

## 2024-06-03 NOTE — PROGRESS NOTES
POSTPARTUM PROGRESS NOTE    Subjective:     PPD/POD#: 4   Procedure: Primary LTCS for fetal malpresentation and vWF with unknown assay levels (GETA)   EGA: 32w1d   N/V: No   F/C: No   Abd Pain: Mild, well-controlled with oral pain medication   Lochia: Mild   Voiding: Yes   Ambulating: Yes   Bowel fnc: Yes   Contraception: Patient desires nexplanon at 6 wk pp appointment     Reports doing well this AM.  Reports pain well controlled.  Ambulating to restroom w/o issue.  Tolerating PO w/o issue.  Passing flatus but no BM.  Has seen baby in NICU several times and is doing well. No other concerns at this time. Desires DC at this time    Objective:      Temp:  [97.8 °F (36.6 °C)-98.8 °F (37.1 °C)] 98.8 °F (37.1 °C)  Pulse:  [86-93] 92  Resp:  [18] 18  SpO2:  [98 %-99 %] 99 %  BP: (117-122)/(72-77) 117/77    Abdomen: Soft, appropriately tender   Uterus: Firm, no fundal tenderness   Incision: Bandage in place without shadowing     Lab Review    Recent Labs   Lab 05/30/24 2113      K 3.7      CO2 19*   BUN 6   CREATININE 0.6      PROT 7.1   BILITOT 0.3   ALKPHOS 108   ALT 19   AST 20     Recent Labs   Lab 05/30/24 2113 05/30/24  2252 05/30/24 2256 06/01/24  0425   WBC 14.33*  --   --  17.81*   HGB 13.3  --   --  11.6*   HCT 39.2 41 41 35.7*   MCV 87  --   --  89     --   --  299     I/O  No intake or output data in the 24 hours ending 06/03/24 0736    Assessment and Plan:   Postpartum care:  - Patient doing well  - Continue routine management and advances  - PRN melatonin for sleep    Von Willebrand Disease  - follows closely by SONI (Dr. Yates), Dr. Young (her hematologist), and Dr. Magaña ([facility])  - presumed Type 2M vWD   - vW factor activity 84% on 4/8/24, repeat obtained on admit  -Recommendations per Heme/Onc  - postpartum: Humate-P 50 units/kilogram following delivery then 25 units/kilogram every 12-24 hours for 3-5 days. Tranexamic acid 25 milligrams/kilogram orally every 6-8  hours for 5 days.   - Patient lost IV access overnight and is declining any additional doses of medications at this time   - Patient has received Humate-P 50 units/kilogram following delivery then 25 units/kilogram every 12-24 hours for 3 days. Patient has received Tranexamic acid 25 milligrams/kilogram orally every 6-8 hours for 3 days     Obesity  - BMI 33  - Encourage ambulation  - Lovenox: not indicated    gHTN  - BP: (117-122)/(72-77) 117/77   - asymptomatic  - preE labs as above  - Mag: not indicated  - Hypertensive agent: not indicated    Chlamydia  - positive at initial OB apt   - DOUGLAS neg    Lori Alvarez MD  Obstetrics and Gynecology - PGY1

## 2024-06-04 ENCOUNTER — PATIENT MESSAGE (OUTPATIENT)
Dept: OBSTETRICS AND GYNECOLOGY | Facility: OTHER | Age: 24
End: 2024-06-04
Payer: MEDICAID

## 2024-06-05 ENCOUNTER — LACTATION ENCOUNTER (OUTPATIENT)
Dept: INTENSIVE CARE | Facility: OTHER | Age: 24
End: 2024-06-05

## 2024-06-05 NOTE — LACTATION NOTE
This note was copied from a baby's chart.  Mother/Baby being followed by lactation.  LC spoke with mother on phone. Mother stated that she is pumping 7-8 x/day; 30 ml/pump (day 6). Discussed changing to maintain setting on Symphony.  Mother using a pumping ryder to track pumping sessions. Discussed goal volume of 25-30 oz/day by 2 weeks. Mother using Symphony rental and has ordered her personal Motif Eloisa from [employer].  Praise and ongoing lactation support offered,  Megha Ward, BSN, RNC, IBCLC

## 2024-06-10 ENCOUNTER — TELEPHONE (OUTPATIENT)
Dept: OBSTETRICS AND GYNECOLOGY | Facility: CLINIC | Age: 24
End: 2024-06-10
Payer: MEDICAID

## 2024-06-10 NOTE — TELEPHONE ENCOUNTER
Pt wanted to schedule post partum visit with provider sooner than 6 weeks. Notified pt we would call back once we hear back from provider to see when she could come in. Pt voiced understanding.

## 2024-06-11 ENCOUNTER — TELEPHONE (OUTPATIENT)
Dept: OBSTETRICS AND GYNECOLOGY | Facility: CLINIC | Age: 24
End: 2024-06-11
Payer: MEDICAID

## 2024-06-11 ENCOUNTER — PATIENT MESSAGE (OUTPATIENT)
Dept: OBSTETRICS AND GYNECOLOGY | Facility: CLINIC | Age: 24
End: 2024-06-11
Payer: MEDICAID

## 2024-06-11 NOTE — TELEPHONE ENCOUNTER
Spoke with pt to reschedule her incision check appt today due to pt going to court today. New appt not scheduled yet. Pt will respond through portal messaging.

## 2024-06-11 NOTE — TELEPHONE ENCOUNTER
----- Message from Sudeep Velarde sent at 6/11/2024  9:37 AM CDT -----  Type: Needs Medical Advice  Who Called:  pt  Best Call Back Number: [phone]  Additional Information: pt is calling the office to speak with the nurse in regards to having to reschedule her PP visit today as she is in court. The pt needs to be seen this week for her incision check. Pt is asking for a call back at noon or a message in her Portal. Please call back to advise. Thanks!

## 2024-06-12 ENCOUNTER — POSTPARTUM VISIT (OUTPATIENT)
Dept: OBSTETRICS AND GYNECOLOGY | Facility: CLINIC | Age: 24
End: 2024-06-12
Payer: MEDICAID

## 2024-06-12 ENCOUNTER — PATIENT MESSAGE (OUTPATIENT)
Dept: OBSTETRICS AND GYNECOLOGY | Facility: CLINIC | Age: 24
End: 2024-06-12

## 2024-06-12 VITALS
HEIGHT: 61 IN | BODY MASS INDEX: 31.53 KG/M2 | SYSTOLIC BLOOD PRESSURE: 114 MMHG | DIASTOLIC BLOOD PRESSURE: 74 MMHG | WEIGHT: 167 LBS

## 2024-06-12 DIAGNOSIS — Z30.09 OTHER GENERAL COUNSELING AND ADVICE FOR CONTRACEPTIVE MANAGEMENT: ICD-10-CM

## 2024-06-12 PROCEDURE — 99999 PR PBB SHADOW E&M-EST. PATIENT-LVL III: CPT | Mod: PBBFAC,,, | Performed by: GENERAL PRACTICE

## 2024-06-12 PROCEDURE — 99213 OFFICE O/P EST LOW 20 MIN: CPT | Mod: PBBFAC,PO | Performed by: GENERAL PRACTICE

## 2024-06-12 RX ORDER — NORETHINDRONE 0.35 MG/1
1 TABLET ORAL DAILY
Qty: 90 TABLET | Refills: 3 | Status: SHIPPED | OUTPATIENT
Start: 2024-06-12 | End: 2025-06-12

## 2024-06-12 NOTE — Clinical Note
Kathy!  Rosa mentioned you planned on running tests postpartum that might have been inaccurate while pregnant.  She plans on using the Nexplanon implant for contraception, and I don't imagine it would interfere with any of your testing.  However please let me know if I'm wrong on that, and we'll wait to place it until you've done what you need to do.  Thank you, Jie

## 2024-06-12 NOTE — PROGRESS NOTES
OB PROBLEM LIST:  Von Willebrand Disease, Type 2a, follows with Dr. Juan Young  Breech fetus  Short cervix  Chlamydia @ [facility] (DOUGLAS neg, T3 STD testing negative)    OB History    Para Term  AB Living   1 1   1   1   SAB IAB Ectopic Multiple Live Births         0 1      # Outcome Date GA Lbr Graham/2nd Weight Sex Type Anes PTL Lv   1  24 32w1d   M CS-LTranv Gen Y KIRSTIN     Subjective  Rosa Alvarez is a 23 y.o.  who is here for an incision check and BP check.  See below for relevant details related to the pregnancy, delivery, and hospital course.  Delivery Note is pasted below.  Baby boy Thelma is doing well in NICU - transferred to [facility] from [facility] for patient convenience.  On May 30 at 32+1wks had emergent PLTCS for breech / bleeding, labor (7cm dilated), was under GETA.  Was feeling period cramps and mild back pain only.    She reports:  Vaginal bleeding: light  Infant feeding: breastfeeding or pumping exclusively  Bowel function normal: Yes  Bladder function normal: Yes  Trouble with abdominal incision: No  Minimal use of pain pills    Physical Exam:  VITALS:   Vitals:    24 1044   BP: 114/74     GEN = alert/oriented, nad  HEENT = sclera anicteric, EOM grossly normal  BREASTS = deferred  ABD = soft, nontender, nondistended, incision healing well    PAP HISTORY: only had one so far:  LAST PAP: ASCUS / HPV Negative / Date: 2023    Assessment and Plan:  Doing well 2wks postpartum.    -next PAP 2026  -Rx for minipill to start in 1 week; see AVS for specific instructions; reviewed with patient  -Nexplanon informed consent obtained today (has used before); will plan to place @ 6wk pp visit; will defer if it would interfere with any testing Dr. Young might need to run re VWD  -RTC for routine 6wk PP visit, sooner rogelio KELLY MD    ---------------------------------------------------------------    HOSPITAL COURSE   Rosa Alvarez is a 23 y.o. now , POD #4 who was admitted  on 2024 at 32w1d for vaginal bleeding. Patient was noted to be in breech presentation, therefore decision was made to proceed with an urgent pLTCS. Patient was subsequently admitted to labor and delivery unit with signed consents.      Patient was taken to the OR and was placed under general anesthesia. A pLTCS was performed without complications.      Please see delivery note for further details. Her postpartum course was complicated by von Willebrands disease. Patient received Humate-P 50 units/kilogram following delivery then 25 units/kilogram every 12-24 hours for 3 days. Patient also received Tranexamic acid 25 milligrams/kilogram orally every 6-8 hours for 3 days. Patient will be discharge on oral TXA for a total of 7 days. Patient met criteria for gHTN, however did not require initiation of any antihypertensive agents this admission. On discharge day, patient's pain is controlled with oral pain medications. Pt is tolerating ambulation without SOB or CP, and regular diet without N/V. Reports lochia is mild. Denies any HA, vision changes, F/C, LE swelling. Denies any breast pain/soreness.     Pt in stable condition and ready for discharge. She has been instructed to start and/or continue medications and follow up with her obstetrics provider as listed below.     Patient to follow up with her primary OBGYN at 6 weeks for a postpartum appointment. Patient does desire a Nexplanon at that time.    -------------------------     Section Procedure Note     Procedure Date: 2024      Procedure:   1.   Section via Pfannenstiel skin incision     Indications:   1. malpresentation: breech   2. PTL     Pre-operative Diagnosis:   IUP at 32w1d pregnancy  vWF disease     Post-operative Diagnosis:   Same as above      Surgeon:  Gwendolyn Luevano MD Attending      Assistants: Genevieve Spears MD-PGY 3     Anesthesia: GETA      Findings:    1. Single viable  male infant, with APGARS 2/8, weight 1900g.   2.  Normal appearing uterus, ovaries, and fallopian tubes.  3. Normal placenta.   4. Excellent hemostasis noted following closure of each tissue layer. ZEFERINO applied to hysterotomy and uterine serosa prophylactically. Administered 1g TXA at cord clamping due to vWF disease. Administered methergine 0.2mg IM at end of case due to uterine bogginess, resolved.      Estimated Blood Loss:  495 mL           Total IV Fluids: see anesthesia report      UOP: see anesthesia report      Specimens: Placenta, discarded     PreOp CBC:         Lab Results   Component Value Date     WBC 14.33 (H) 2024     HGB 13.3 2024     HCT 41 2024     MCV 87 2024      2024                      Complications:  None; patient tolerated the procedure well.           Disposition: PACU - hemodynamically stable.           Condition: stable     Procedure Details:   The patient was seen in the ED. BSU confirmed breech presentation. Initial SVE 5cm however on recheck 7cm with BBOW and palpable fetal cord. Decision made to proceed with emergent CS. Unable to obtain epidural anesthesia due to VWF disease, discussed plan for GETA with patient. The risks, benefits, complications, treatment options, and expected outcomes were discussed with the patient.  The patient concurred with the proposed plan, giving informed consent. The patient was taken to operating room, identified as Rosa Alvarez and the procedure verified as  delivery. A time out was held and the above information confirmed.     The patient was prepped and draped in the usual sterile manner while placed in a dorsal supine position with a left lateral tilt.  A abad catheter was also placed per nursing. Preoperative antibiotics were administered. Following confirmed general anesthesia and intubation, a Pfannenstiel incision was made and carried down through the subcutaneous tissue to the fascia. Fascial incision was made and extended bluntly transversely.  The peritoneum was identified, found to be free of adherent bowel and entered bluntly. Peritoneal incision was extended longitudinally with finger traction. The vesico-uterine peritoneum was identified and bladder blade was inserted. A low transverse uterine incision was made with knife and extended with finger fracture. The amniotic sac was ruptured with bluntly and the infant was noted to be in breech position. The fetal breech was brought to the incision and elevated out of the pelvis using normal breech manuevers. The patient delivered a single viable male infant without difficulty. After the umbilical cord was clamped and cut cord blood was obtained for evaluation. The placenta was removed intact and appeared normal and was discarded. The uterus was exteriorized. The uterine outline, tubes and ovaries appeared normal. The uterine incision was closed in one layers with running locked sutures of Vicryl. Additional figure 8 style sutures were thrown R of midline at superior edge of hysterotomy. Hemostasis was observed.      The uterus was returned to the abdominal cavity. Incision was reinspected and good hemostasis was noted. The abdominal cavity was wiped with wet laps to remove clots. The fascia was then reapproximated with running sutures of Vicryl. The subcutaneous fat and skin was reapproximated with Vicryl and Monocryl respectively. The wound was covered with a pressure dressing.      Instrument, sponge, and needle counts were correct prior the abdominal closure and at the conclusion of the case.     Pt tolerated procedure well and was in stable condition after the procedure.     **NOTE: This patient IS a candidate for trial of labor after  delivery**     Genevieve Spears MD  PGY 3  Obstetrics and Gynecology

## 2024-06-12 NOTE — PATIENT INSTRUCTIONS
"You can start it the day you pick it up (no need to wait for a Sunday or a period).    It's very important to take it the same time every day, so consider setting an alarm in your phone.  If you're off by even three hours, you should use back-up for the next 2 days.    If you miss a day altogether, take the missed pill as soon as you remember and the next pill at your usual time.  Use back-up for the next 2 days.    There are no sugar or placebo pills with this birth control (compared to the "regular" birth control pill), so you need to take the entire pack - one pill every day.    "

## 2024-06-24 ENCOUNTER — LACTATION ENCOUNTER (OUTPATIENT)
Dept: INTENSIVE CARE | Facility: HOSPITAL | Age: 24
End: 2024-06-24

## 2024-06-24 NOTE — LACTATION NOTE
This note was copied from a baby's chart.  Spoke to mom over the phone regarding pumping. Mom feels that she is not getting enough milk & reports that she pumps every 3 hours & gets about 1 oz from each pumping session. Suggested power pumping 1-2 times days along with her regular pumping session. Instructed mom to bring her pump & pump parts to the hospital on Wednesday to make sure her flanges fits her correctly & to show her to use her personal breast pump properly. Reinforced the importance when bringing ebm to the nicu to have ice packs in the cooler bag to keep her ebm cold. Mom verbalized understanding

## 2024-07-14 ENCOUNTER — PATIENT MESSAGE (OUTPATIENT)
Dept: LACTATION | Facility: CLINIC | Age: 24
End: 2024-07-14
Payer: MEDICAID

## 2024-07-22 ENCOUNTER — POSTPARTUM VISIT (OUTPATIENT)
Dept: OBSTETRICS AND GYNECOLOGY | Facility: CLINIC | Age: 24
End: 2024-07-22
Payer: MEDICAID

## 2024-07-22 VITALS
SYSTOLIC BLOOD PRESSURE: 110 MMHG | BODY MASS INDEX: 31.91 KG/M2 | WEIGHT: 169 LBS | HEIGHT: 61 IN | DIASTOLIC BLOOD PRESSURE: 64 MMHG

## 2024-07-22 DIAGNOSIS — Z30.09 OTHER GENERAL COUNSELING AND ADVICE FOR CONTRACEPTIVE MANAGEMENT: Primary | ICD-10-CM

## 2024-07-22 PROCEDURE — 11981 INSERTION DRUG DLVR IMPLANT: CPT | Mod: S$PBB,,, | Performed by: GENERAL PRACTICE

## 2024-07-22 PROCEDURE — 99999 PR PBB SHADOW E&M-EST. PATIENT-LVL II: CPT | Mod: PBBFAC,,, | Performed by: GENERAL PRACTICE

## 2024-07-22 PROCEDURE — 99212 OFFICE O/P EST SF 10 MIN: CPT | Mod: PBBFAC,PO | Performed by: GENERAL PRACTICE

## 2024-07-22 PROCEDURE — 99499 UNLISTED E&M SERVICE: CPT | Mod: S$PBB,,, | Performed by: GENERAL PRACTICE

## 2024-07-22 PROCEDURE — 11981 INSERTION DRUG DLVR IMPLANT: CPT | Mod: PBBFAC,PO | Performed by: GENERAL PRACTICE

## 2024-07-22 PROCEDURE — 99999PBSHW PR PBB SHADOW TECHNICAL ONLY FILED TO HB: Mod: PBBFAC,,,

## 2024-07-22 RX ADMIN — ETONOGESTREL 68 MG: 68 IMPLANT SUBCUTANEOUS at 04:07

## 2024-07-22 NOTE — PROGRESS NOTES
Subjective   07/22/2024    Rosa Alvarez presents today for Nexplanon placement.  Please see my last note for details.  She has used Nexplanon before.  Only problem last time is that her arm intermittently went numb.  The problem resolved when she had the Nexplanon removed.    HCG ( today in clinic ) negative    Informed Consent Discussion:  Risks related to placement / removal: pain, infection, bleeding, difficult placement / removal, potential need for surgery.  Bleeding might become irregular, lighter, heavier, or stop altogether.  After a few months of use, if irregular bleeding persists, there are some prescription medications that can improve the bleeding pattern.  Does not protect against STD's  Pregnancy is extremely rare, but risk of ectopic pregnancy is higher if conception does occur.  Nexplanon lasts for 3 years but can be removed sooner than that.  Potential side effects of Nexplanon:  headache, irregular bleeding patterns (in up to 50% of patients), amenorrhea (20% in first year, 40% after one year), mood changes, acne, visible scar / darkening of skin over implant, weight gain up to 8lbs, breast tenderness.  Benefits include: ease of use, long-term contraception (3yrs), immediate return to fertility upon removal, well-tolerated, highly effective contraception.  Effective for birth control after 7 days (recommend abstinence or condoms for first seven days).    Site Verification:  Proper patient, procedure, and site were confirmed prior to starting.    VITALS =  Vitals:    07/22/24 1044   BP: 110/64       GEN = nad, alert/oriented, pleasant  LEFT ARM = old Nexplanon scar rather medial    Procedure:  Nexplanon LOT #[id number] .  Left arm used for placement.  Arm cleaned with iodine.  Planned insertion site ~8cm medial to elbow and ~4cm inferior to the biceps/triceps sulcus, overlying the triceps muscle.  ~2cc of 1% lidocaine with epinephrine used for anesthesia.  Presence of Nexplanon karina confirmed in  needle.  Nexplanon placed without difficulty, and the karina was easily palpated through skin.  Patient tolerated well and able to palpate the karina herself.  Pressure dressing applied.  Return precautions reviewed.    ASSESSMENT / PLAN   Uncomplicated Nexplanon placement today.    Continue minipill, abstinence, or condoms x 7 days  Keep pressure dressing on for 8-12hrs; okay to shower in 24hrs  f/u prn  EPDS reviewed, no intervention indicated  next PAP NOV 2026    MD ROBIN

## 2024-07-22 NOTE — PATIENT INSTRUCTIONS
If it has been more than 5 days from the FIRST day of your last menstrual period, you should use back-up birth control (condoms) or abstain from sex for 7 days.    Keep the pressure dressing on for 8-12hrs.  If your fingers are numb or discolored, LOOSEN the dressing!    Okay to shower in 24hrs.    Keep steristrips on (the white pieces of tape) for 2-3 days.  If they fall off before then, it's not a problem.    For pain you can take over-the-counter medications such as ibuprofen or tylenol.    Don't apply any topical ointments, creams, or powders to the wound.    You should go to the ER for extreme pain, skin that is turning black, foul odor, swelling / redness, or fever.

## 2024-08-16 ENCOUNTER — TELEPHONE (OUTPATIENT)
Dept: OBSTETRICS AND GYNECOLOGY | Facility: CLINIC | Age: 24
End: 2024-08-16
Payer: MEDICAID

## 2024-08-16 NOTE — TELEPHONE ENCOUNTER
Pt states she's experienced heavy bleeding for the last 4 days. Denies any pain; pt states she isn't changing her pad every hour, but she did change it at 9:30 this morning, and again around 12 pm and she bled through her clothes. Notified pt since she had her Nexplanon inserted less than a month ago, it is normal for her to experience irregular bleeding, but if she continues to bleed heavy and has to change her pad every hour for 3 consecutive hours to let us know. Pt voiced understanding, and wanted to know should she be concerned considering she has a bleeding disorder. Please advise.

## 2024-08-16 NOTE — TELEPHONE ENCOUNTER
----- Message from Jeromy Smith sent at 8/16/2024  2:30 PM CDT -----  Contact: Self  Type: Needs Medical Advice  Who Called:  Patient    Best Call Back Number: [phone]   Additional Information:  Called to speak with office regarding tetanus vaccine record. Wanted office to send via email.     Would also like to discuss era problem/concern visit to discuss heavy bleeding during periods.     Please call.

## 2024-09-17 ENCOUNTER — TELEPHONE (OUTPATIENT)
Dept: OBSTETRICS AND GYNECOLOGY | Facility: CLINIC | Age: 24
End: 2024-09-17
Payer: MEDICAID

## 2024-09-17 NOTE — TELEPHONE ENCOUNTER
----- Message from Jeromy Smith sent at 9/17/2024 12:07 PM CDT -----  Contact: Self  Type:  Sooner Appointment Request    Caller is requesting a sooner appointment.  Caller declined first available appointment listed below.  Caller will not accept being placed on the waitlist and is requesting a message be sent to doctor.    Name of Caller:  Patient  When is the first available appointment?  N/a  Symptoms:  nexplanon removal  Would the patient rather a call back or a response via [facility]? Call  Best Call Back Number:  [phone]   Additional Information:   Recommended OBSERVATION and MONITORING for change.

## 2024-10-07 ENCOUNTER — OFFICE VISIT (OUTPATIENT)
Dept: OBSTETRICS AND GYNECOLOGY | Facility: CLINIC | Age: 24
End: 2024-10-07
Payer: MEDICAID

## 2024-10-07 VITALS
DIASTOLIC BLOOD PRESSURE: 84 MMHG | WEIGHT: 175.69 LBS | BODY MASS INDEX: 33.17 KG/M2 | HEIGHT: 61 IN | SYSTOLIC BLOOD PRESSURE: 122 MMHG

## 2024-10-07 DIAGNOSIS — Z30.09 ENCOUNTER FOR COUNSELING REGARDING CONTRACEPTION: ICD-10-CM

## 2024-10-07 DIAGNOSIS — N93.9 ABNORMAL UTERINE BLEEDING (AUB): Primary | ICD-10-CM

## 2024-10-07 DIAGNOSIS — N92.1 BREAKTHROUGH BLEEDING ON NEXPLANON: ICD-10-CM

## 2024-10-07 DIAGNOSIS — Z97.5 BREAKTHROUGH BLEEDING ON NEXPLANON: ICD-10-CM

## 2024-10-07 PROBLEM — D68.9: Status: RESOLVED | Noted: 2024-02-19 | Resolved: 2024-10-07

## 2024-10-07 PROBLEM — Z98.891 S/P CESAREAN SECTION: Status: RESOLVED | Noted: 2024-06-01 | Resolved: 2024-10-07

## 2024-10-07 PROBLEM — O34.33 PREMATURE CERVICAL DILATION IN THIRD TRIMESTER: Status: RESOLVED | Noted: 2024-05-09 | Resolved: 2024-10-07

## 2024-10-07 PROBLEM — O26.872 SHORT CERVIX DURING PREGNANCY IN SECOND TRIMESTER: Status: RESOLVED | Noted: 2024-02-19 | Resolved: 2024-10-07

## 2024-10-07 PROBLEM — O99.119: Status: RESOLVED | Noted: 2024-02-19 | Resolved: 2024-10-07

## 2024-10-07 PROBLEM — O60.00 PRETERM LABOR: Status: RESOLVED | Noted: 2024-05-30 | Resolved: 2024-10-07

## 2024-10-07 PROCEDURE — 11982 REMOVE DRUG IMPLANT DEVICE: CPT | Mod: PBBFAC,PO | Performed by: OBSTETRICS & GYNECOLOGY

## 2024-10-07 PROCEDURE — 99212 OFFICE O/P EST SF 10 MIN: CPT | Mod: PBBFAC,PO | Performed by: OBSTETRICS & GYNECOLOGY

## 2024-10-07 PROCEDURE — 99999 PR PBB SHADOW E&M-EST. PATIENT-LVL II: CPT | Mod: PBBFAC,,, | Performed by: OBSTETRICS & GYNECOLOGY

## 2024-10-07 NOTE — PROGRESS NOTES
Pertinent Med & GYN Problem List    Von Willebrand's disease    Subjective:   Chief Complaint:  Contraception      Date: 10/7/2024     Patient ID: Rosa Alvarez is a  24 y.o. female.    No LMP recorded.    She was last seen by Jie Montes MD secondary to pregnancy [as the patient is new to me her previous office note and chart were reviewed.]  On 2024 she underwent a  section for vaginal bleeding with breech presentation.    The patient presents today due to the following:  On 2024 she reports placement of a Nexplanon subdermal implant.      Since that time she has had increased issues with bleeding headaches and cramping.  She is no longer breastfeeding.    She presents today for removal of the Nexplanon implant.      GYN & OB History  LMP: No LMP recorded.     Age of Menarche:12  Age at first pregnancy:23   Age at first live birth:   Number of months breastfeeding:    Age at Menopause:    Comments:     OB History          1    Para   1    Term           1    AB        Living   1         SAB        IAB        Ectopic        Multiple   0    Live Births   1                 Allergies: Review of patient's allergies indicates:  No Known Allergies    Past Medical History:   Diagnosis Date    Hereditary factor VIII deficiency     Von Willebrand disease        Past Surgical History:   Procedure Laterality Date     SECTION N/A 2024    Procedure:  SECTION;  Surgeon: Gwendolyn Luevano MD;  [facility];  Service: OB/GYN;  Laterality: N/A;       Medications    Current Outpatient Medications:     norgestrel-ethinyl estradioL (LO/OVRAL) 0.3-30 mg-mcg per tablet, Take 1 tablet by mouth once daily., Disp: 90 tablet, Rfl: 3    prenatal vit/iron fum/folic ac (PRENATAL 1+1 ORAL), Take by mouth. (Patient not taking: Reported on 10/7/2024), Disp: , Rfl:      Social History     Tobacco Use    Smoking status: Former     Current packs/day: 0.00     Types: Cigarettes,  Vaping with nicotine     Quit date: 2023     Years since quittin.8    Smokeless tobacco: Never   Substance Use Topics    Alcohol use: Never    Drug use: Never       No family history on file.    Review of Systems (at today's evaluation)  Review of Systems   Constitutional:  Negative for fever and unexpected weight change.   Respiratory:  Negative for cough and shortness of breath.    Cardiovascular:  Negative for chest pain and palpitations.   Gastrointestinal:  Negative for constipation, diarrhea, nausea and vomiting.   Genitourinary:  Negative for dysuria, frequency, hematuria and urgency.        Gyn as per HPI   Neurological:  Negative for headaches.        Objective:      Vitals:    10/07/24 1541   BP: 122/84     Physical Exam:   Constitutional: She appears well-developed and well-nourished. No distress.    HENT:   Head: Normocephalic.     Neck: No thyroid mass present.    Cardiovascular:  Normal rate.             Pulmonary/Chest: Effort normal. No respiratory distress.        Abdominal: Soft. There is no abdominal tenderness.             Musculoskeletal: Normal range of motion.      Right lower leg: No edema.      Left lower leg: No edema.       Neurological: She is alert.   No gross lesions noted.    Skin: Skin is warm and dry.    Psychiatric: She has a normal mood and affect. Her speech is normal and behavior is normal. Mood normal.       Pre-Procedure Time Out  Patient identification confirmed.  The planned procedure and procedure site were discussed.  The pros, cons, risks, benefits, alternatives, and indications of the office procedure were discussed in detail with the patient.  We discussed the possibility of allergic reactions, bleeding, infection, and other issues unique to this procedure were discussed.    All participating parties are in agreed with the current procedure plan.  Verbal consent from the patient was obtained.    Nexplanon Removal Procedure Note    PRE-OP DIAGNOSIS: Desire for  removal of Nexplanon   POST-OP DIAGNOSIS: Same   PROCEDURE: Nexplanon Removal     Performing Physician: Joaquim Dale MD     PROCEDURE:     The Nexplanon was palpated in the patient's left arm.  The area was cleaned with alcohol and approximately 3 mL of 2% Lidocaine without epinephrine was injected at the removal site.  An appropriate amount of time was allowed to pass.    The area surrounding the Nexplanon was prepared with Betadine and draped in the usual sterile manner.  A skin incision was made over the distal aspect of the device. The capsule and tissue surrounding the implant were lysed sharply and the device removed using a hemostat.   Good hemostasis was noted.    The site was dressed with SteriStrips, a 4 x 4 and a pressure dressing.     The patient tolerated the procedure well.     Blood Loss: Minimal     Follow-up: The patient tolerated the procedure well and without complications.  Standard post-procedure care and issues such as fever, bleeding, redness, draining or other issues requiring immediate follow-up discussed.     Assessment:        1. Abnormal uterine bleeding (AUB)    2. Encounter for counseling regarding contraception    3. Breakthrough bleeding on Nexplanon        Plan:      Abnormal uterine bleeding (AUB)    Encounter for counseling regarding contraception  -     norgestrel-ethinyl estradioL (LO/OVRAL) 0.3-30 mg-mcg per tablet; Take 1 tablet by mouth once daily.  Dispense: 90 tablet; Refill: 3    Breakthrough bleeding on Nexplanon       Follow up in about 1 week (around 10/14/2024) for F/U on today's evaluation, as needed / for any GYN related issues.     The above was reviewed discussed with the patient.  Prior to removal of the Nexplanon potential treatment options for abnormal bleeding on Nexplanon were discussed.  The patient's questions were answered and she does desire removal of the Nexplanon.      The patient tolerated removal of the Nexplanon without difficulty.    Potential  birth control options were discussed and the patient has elected to proceed with an estrogen progesterone oral contraceptive.  She is being started on a 30 mcg OCP.  The pros, cons, risks, benefits, alternatives and indications of the medication(s) prescribed, as well as appropriate use and potential side effects were discussed.  Oncologic and cardiovascular issues associated hormone replacement therapy were discussed.      The patient's questions were answered, and she is in agreement with the current plan.     Joaquim Dale MD  Department OBGYN  [facility]

## 2024-10-17 ENCOUNTER — PATIENT MESSAGE (OUTPATIENT)
Dept: RESEARCH | Facility: HOSPITAL | Age: 24
End: 2024-10-17
Payer: MEDICAID

## 2024-10-22 ENCOUNTER — PATIENT MESSAGE (OUTPATIENT)
Dept: RESEARCH | Facility: HOSPITAL | Age: 24
End: 2024-10-22
Payer: MEDICAID

## 2024-11-13 ENCOUNTER — HOSPITAL ENCOUNTER (EMERGENCY)
Facility: HOSPITAL | Age: 24
Discharge: HOME | End: 2024-11-13
Attending: STUDENT IN AN ORGANIZED HEALTH CARE EDUCATION/TRAINING PROGRAM
Payer: MEDICAID

## 2024-11-13 VITALS
OXYGEN SATURATION: 97 % | RESPIRATION RATE: 20 BRPM | DIASTOLIC BLOOD PRESSURE: 82 MMHG | SYSTOLIC BLOOD PRESSURE: 147 MMHG | TEMPERATURE: 100 F | HEART RATE: 120 BPM | WEIGHT: 140 LBS | BODY MASS INDEX: 26.45 KG/M2

## 2024-11-13 DIAGNOSIS — R00.0 TACHYCARDIA: ICD-10-CM

## 2024-11-13 DIAGNOSIS — B34.9 VIRAL SYNDROME: Primary | ICD-10-CM

## 2024-11-13 LAB
B-HCG UR QL: NEGATIVE
BILIRUB UR QL STRIP: NEGATIVE
CLARITY UR: CLEAR
COLOR UR: YELLOW
CTP QC/QA: YES
GLUCOSE UR QL STRIP: NEGATIVE
GROUP A STREP, MOLECULAR: NEGATIVE
HGB UR QL STRIP: NEGATIVE
INFLUENZA A, MOLECULAR: NEGATIVE
INFLUENZA B, MOLECULAR: NEGATIVE
KETONES UR QL STRIP: NEGATIVE
LEUKOCYTE ESTERASE UR QL STRIP: NEGATIVE
NITRITE UR QL STRIP: NEGATIVE
PH UR STRIP: 6 [PH] (ref 5–8)
PROT UR QL STRIP: NEGATIVE
SARS-COV-2 RDRP RESP QL NAA+PROBE: NEGATIVE
SP GR UR STRIP: 1.02 (ref 1–1.03)
SPECIMEN SOURCE: NORMAL
URN SPEC COLLECT METH UR: NORMAL
UROBILINOGEN UR STRIP-ACNC: NEGATIVE EU/DL

## 2024-11-13 PROCEDURE — 93010 ELECTROCARDIOGRAM REPORT: CPT | Mod: ,,, | Performed by: INTERNAL MEDICINE

## 2024-11-13 PROCEDURE — 81003 URINALYSIS AUTO W/O SCOPE: CPT | Performed by: STUDENT IN AN ORGANIZED HEALTH CARE EDUCATION/TRAINING PROGRAM

## 2024-11-13 PROCEDURE — 87502 INFLUENZA DNA AMP PROBE: CPT | Performed by: STUDENT IN AN ORGANIZED HEALTH CARE EDUCATION/TRAINING PROGRAM

## 2024-11-13 PROCEDURE — 25000003 PHARM REV CODE 250: Performed by: STUDENT IN AN ORGANIZED HEALTH CARE EDUCATION/TRAINING PROGRAM

## 2024-11-13 PROCEDURE — 93005 ELECTROCARDIOGRAM TRACING: CPT | Performed by: INTERNAL MEDICINE

## 2024-11-13 PROCEDURE — 87635 SARS-COV-2 COVID-19 AMP PRB: CPT | Performed by: STUDENT IN AN ORGANIZED HEALTH CARE EDUCATION/TRAINING PROGRAM

## 2024-11-13 PROCEDURE — 87651 STREP A DNA AMP PROBE: CPT | Performed by: STUDENT IN AN ORGANIZED HEALTH CARE EDUCATION/TRAINING PROGRAM

## 2024-11-13 PROCEDURE — 99285 EMERGENCY DEPT VISIT HI MDM: CPT | Mod: 25

## 2024-11-13 PROCEDURE — 81025 URINE PREGNANCY TEST: CPT | Performed by: STUDENT IN AN ORGANIZED HEALTH CARE EDUCATION/TRAINING PROGRAM

## 2024-11-13 RX ORDER — IBUPROFEN 200 MG
600 TABLET ORAL
Status: COMPLETED | OUTPATIENT
Start: 2024-11-13 | End: 2024-11-13

## 2024-11-13 RX ORDER — ACETAMINOPHEN 500 MG
1000 TABLET ORAL
Status: COMPLETED | OUTPATIENT
Start: 2024-11-13 | End: 2024-11-13

## 2024-11-13 RX ORDER — DOCUSATE SODIUM 100 MG
600 CAPSULE ORAL
Status: DISCONTINUED | OUTPATIENT
Start: 2024-11-13 | End: 2024-11-13 | Stop reason: HOSPADM

## 2024-11-13 RX ADMIN — IBUPROFEN 600 MG: 200 TABLET, FILM COATED ORAL at 07:11

## 2024-11-13 RX ADMIN — Medication 600 ML: at 07:11

## 2024-11-13 RX ADMIN — ACETAMINOPHEN 1000 MG: 500 TABLET, FILM COATED ORAL at 07:11

## 2024-11-13 NOTE — ED PROVIDER NOTES
Encounter Date: 2024       History     Chief Complaint   Patient presents with    Fever     Fever, headache, sore throat since Monday   Highest 102.3      HPI    Rosa Alvarez is a 24 y.o. female without significant medical problems that presents emergency department for fever, headache, sore throat, and nasal congestion that has been ongoing since Monday.  Took her temperature this morning and it was 102.3.  States that when she breathes through her mouth, she does not have any shortness of breath.  Not complaining of any chest pain.  Has a nonproductive cough.  Took Tylenol and ibuprofen for her fever but none this morning.  Denies numbness, weakness, vomiting, diarrhea, and urinary symptoms.    Review of patient's allergies indicates:  No Known Allergies  Past Medical History:   Diagnosis Date    Hereditary factor VIII deficiency     Von Willebrand disease      Past Surgical History:   Procedure Laterality Date     SECTION N/A 2024    Procedure:  SECTION;  Surgeon: Gwendolyn Luevano MD;  Location: [facility];  Service: OB/GYN;  Laterality: N/A;     No family history on file.  Social History     Tobacco Use    Smoking status: Former     Current packs/day: 0.00     Types: Cigarettes, Vaping with nicotine     Quit date: 2023     Years since quittin.9    Smokeless tobacco: Never   Substance Use Topics    Alcohol use: Never    Drug use: Never     Review of Systems   Constitutional:  Positive for fever.   HENT:  Positive for congestion, rhinorrhea and sore throat.    Respiratory:  Positive for cough. Negative for shortness of breath.    Cardiovascular:  Negative for chest pain.   Gastrointestinal:  Negative for abdominal pain, diarrhea, nausea and vomiting.   Genitourinary:  Negative for dysuria, frequency and hematuria.   Musculoskeletal:  Negative for back pain.   Skin:  Negative for rash.   Neurological:  Negative for weakness.   Hematological:  Does not bruise/bleed easily.        Physical Exam     Initial Vitals [11/13/24 0635]   BP Pulse Resp Temp SpO2   (!) 132/94 (!) 136 20 100 °F (37.8 °C) 97 %      MAP       --         Physical Exam    Nursing note and vitals reviewed.  Constitutional: She appears well-developed and well-nourished.   HENT:   Head: Normocephalic and atraumatic.   Eyes: EOM are normal.   Neck:   Normal range of motion.  Cardiovascular:  Normal rate, regular rhythm and normal heart sounds.           Pulmonary/Chest: Breath sounds normal. No respiratory distress. She has no wheezes. She has no rhonchi. She has no rales.   Abdominal: Abdomen is soft. She exhibits no distension. There is no abdominal tenderness. There is no rebound.   Musculoskeletal:         General: Normal range of motion.      Cervical back: Normal range of motion.     Neurological: She is alert and oriented to person, place, and time. GCS eye subscore is 4. GCS verbal subscore is 5. GCS motor subscore is 6.   Skin: Capillary refill takes less than 2 seconds. No rash noted.   Psychiatric: She has a normal mood and affect. Thought content normal.         ED Course   Procedures  Labs Reviewed   GROUP A STREP, MOLECULAR       Result Value    Group A Strep, Molecular Negative     URINALYSIS, REFLEX TO URINE CULTURE    Specimen UA Urine, Clean Catch      Color, UA Yellow      Appearance, UA Clear      pH, UA 6.0      Specific Gravity, UA 1.020      Protein, UA Negative      Glucose, UA Negative      Ketones, UA Negative      Bilirubin (UA) Negative      Occult Blood UA Negative      Nitrite, UA Negative      Urobilinogen, UA Negative      Leukocytes, UA Negative      Narrative:     Specimen Source->Urine   SARS-COV-2 RNA AMPLIFICATION, QUAL    SARS-CoV-2 RNA, Amplification, Qual Negative     INFLUENZA A AND B ANTIGEN    Influenza A, Molecular Negative      Influenza B, Molecular Negative      Flu A & B Source Nasal swab      Narrative:     Specimen Source->Nasopharyngeal Swab   POCT URINE PREGNANCY    POC  Preg Test, Ur Negative       Acceptable Yes          ECG Results              EKG 12-lead (In process)        Collection Time Result Time QRS Duration OHS QTC Calculation    11/13/24 07:36:17 11/13/24 07:57:44 74 440                     In process by Interface, Lab In [facility] (11/13/24 07:57:50)                   Narrative:    Test Reason : R00.0,    Vent. Rate : 126 BPM     Atrial Rate : 126 BPM     P-R Int : 144 ms          QRS Dur :  74 ms      QT Int : 304 ms       P-R-T Axes :  56  94  32 degrees    QTcB Int : 440 ms    Sinus tachycardia  Rightward axis  Borderline Abnormal ECG  When compared with ECG of 29-Sep-2021 22:27,  OR interval has increased  Nonspecific T wave abnormality, worse in Anterior leads    Referred By: AAAREFERRAL SELF           Confirmed By:                                   Imaging Results              X-Ray Chest AP Portable (Final result)  Result time 11/13/24 07:35:20      Final result by Rodriguez Giraldo IV, MD (11/13/24 07:35:20)                   Impression:      No acute cardiopulmonary disease.      Electronically signed by: Rodriguez Giraldo  Date:    11/13/2024  Time:    07:35               Narrative:    EXAMINATION:  XR CHEST AP PORTABLE    CLINICAL HISTORY:  Tachycardia, unspecified    COMPARISON:  09/29/2021    FINDINGS:  The heart and mediastinum appear unremarkable. There is no acute pulmonary vascular engorgement. The lungs are clear with no infiltrate, volume loss or pleural fluid accumulation observed.                                       Medications   electrolytes-dextrose (Pedialyte) oral solution 600 mL (600 mLs Oral Given 11/13/24 0743)   acetaminophen tablet 1,000 mg (1,000 mg Oral Given 11/13/24 0724)   ibuprofen tablet 600 mg (600 mg Oral Given 11/13/24 0724)     Medical Decision Making  Rosa Alvarez is a 24 y.o. female without significant medical problems that presents emergency department for fever, headache, sore throat, and nasal congestion that has  been ongoing since Monday.  Initial vitals with heart rate of 136 and temp of 100.  Well-appearing female.  Nontoxic.  Nasal congestion noted.  Speaking complete sentences.  No respiratory distress.  Lungs are clear.  Heart sounds within normal limits other than tachycardia.  Presentation most consistent with viral syndrome.  UPT negative.  UA without evidence of infection.  Viral swabs here were negative.  Chest x-ray was clear.  Had patient orally rehydrate which he was able to tolerate Pedialyte.  Heart rate is down trending.  Given Tylenol and ibuprofen as well.  Was going to reassess patient and discussed return precautions, but she left prior to receiving discharge instructions.  Her workup was complete and she was stable for discharge given her down trending heart rate with tolerating p.o..  Referred to primary care.    Amount and/or Complexity of Data Reviewed  Labs: ordered.  Radiology: ordered.    Risk  OTC drugs.                                      Clinical Impression:  Final diagnoses:  [R00.0] Tachycardia  [B34.9] Viral syndrome (Primary)          ED Disposition Condition    Discharge Stable          ED Prescriptions    None       Follow-up Information       Follow up With Specialties Details Why Contact Info Additional Information    [facility] - Emergency Dept Emergency Medicine  As needed, If symptoms worsen [address] [facility] [id number]  [phone] 1st floor    [address] [address]  [phone]                Cristiano Pantoja MD  11/13/24 0914

## 2024-11-18 LAB
OHS QRS DURATION: 74 MS
OHS QTC CALCULATION: 440 MS

## 2025-01-29 ENCOUNTER — OCCUPATIONAL HEALTH (OUTPATIENT)
Dept: URGENT CARE | Facility: CLINIC | Age: 25
End: 2025-01-29
Payer: MEDICAID

## 2025-01-29 DIAGNOSIS — Z00.00 ROUTINE GENERAL MEDICAL EXAMINATION AT A HEALTH CARE FACILITY: Primary | ICD-10-CM

## 2025-01-29 PROCEDURE — 86580 TB INTRADERMAL TEST: CPT | Mod: S$GLB,,, | Performed by: EMERGENCY MEDICINE

## 2025-03-18 ENCOUNTER — TELEPHONE (OUTPATIENT)
Dept: OBSTETRICS AND GYNECOLOGY | Facility: CLINIC | Age: 25
End: 2025-03-18
Payer: MEDICAID

## 2025-03-18 NOTE — TELEPHONE ENCOUNTER
Spoke with pt in regards to her scheduling an appt. Pt states she hasn't had a cycle since 1/15/25 but hasn't taken a preg test yet. Pt advised we can schedule appt with Dr. Montes but she should take a preg test before her appt with Dr. Montes. Appt set for 3/26/25 @ 10:00 am. Pt verbalized understanding.

## 2025-03-18 NOTE — TELEPHONE ENCOUNTER
----- Message from Hansa sent at 3/18/2025  4:28 PM CDT -----  Contact: Rosa  Type:  Needs Medical AdviceWho Called:  Rosa Symptoms (please be specific):  Missed cycle How long has patient had these symptoms: About two months since she seen her last cycleWould the patient rather a call back or a response via [facility]? Call back Best Call Back Number:  Please call her at  [phone] Information:  When I ask her if she was pregnant she stated that she is unsure and that she is 9 months postpartum

## 2025-03-26 ENCOUNTER — OFFICE VISIT (OUTPATIENT)
Dept: OBSTETRICS AND GYNECOLOGY | Facility: CLINIC | Age: 25
End: 2025-03-26
Payer: MEDICAID

## 2025-03-26 VITALS
DIASTOLIC BLOOD PRESSURE: 84 MMHG | WEIGHT: 175.5 LBS | RESPIRATION RATE: 17 BRPM | HEIGHT: 61 IN | SYSTOLIC BLOOD PRESSURE: 132 MMHG | BODY MASS INDEX: 33.14 KG/M2

## 2025-03-26 DIAGNOSIS — N92.6 MISSED PERIOD: Primary | ICD-10-CM

## 2025-03-26 LAB
B-HCG UR QL: NEGATIVE
CTP QC/QA: YES

## 2025-03-26 PROCEDURE — 99212 OFFICE O/P EST SF 10 MIN: CPT | Mod: PBBFAC,PO | Performed by: GENERAL PRACTICE

## 2025-03-26 PROCEDURE — 81025 URINE PREGNANCY TEST: CPT | Mod: PBBFAC,PO | Performed by: GENERAL PRACTICE

## 2025-03-26 PROCEDURE — 3075F SYST BP GE 130 - 139MM HG: CPT | Mod: CPTII,,, | Performed by: GENERAL PRACTICE

## 2025-03-26 PROCEDURE — 3079F DIAST BP 80-89 MM HG: CPT | Mod: CPTII,,, | Performed by: GENERAL PRACTICE

## 2025-03-26 PROCEDURE — 99999 PR PBB SHADOW E&M-EST. PATIENT-LVL II: CPT | Mod: PBBFAC,,, | Performed by: GENERAL PRACTICE

## 2025-03-26 PROCEDURE — 1159F MED LIST DOCD IN RCRD: CPT | Mod: CPTII,,, | Performed by: GENERAL PRACTICE

## 2025-03-26 PROCEDURE — 99999PBSHW POCT URINE PREGNANCY: Mod: PBBFAC,,,

## 2025-03-26 PROCEDURE — 99214 OFFICE O/P EST MOD 30 MIN: CPT | Mod: S$PBB,,, | Performed by: GENERAL PRACTICE

## 2025-03-26 PROCEDURE — 3008F BODY MASS INDEX DOCD: CPT | Mod: CPTII,,, | Performed by: GENERAL PRACTICE

## 2025-03-26 NOTE — PROGRESS NOTES
Background   2024  Nexplanon placed.    10/7/2024  Nexplanon removed and Rx for VALENTE (norgestrel/30mcg)     ASSESSMENT AND PLAN   2025  24 y.o.  with isolated irregular menstrual cycle.  Reassured patient.  Discussed checking TSH +/- other hormone levels and checking pelvic US as possible next steps if she continues with irregularity.  Frustrating that she's had unacceptable side effects with various birth control methods (Depo, Nexplanon, pill).  Not interested in an IUD.  Happy enough to use condoms for now.    Send message if amenorrheic up to 3 months - would check above studies  Cervical Cancer screening: next cervical CA screen (PAP) due 2026  HPV Vaccine: complete  Return to clinic: as needed    Leslie L Weeks, MD [facility]    SUBJECTIVE   2025  Rosa Alvaerz is a 24 y.o. here for concerns about being 11d late with her period.  Had a faint positive home HCG then negative the next day.  Has tender breasts and pelvic cramping as if she's about to start a period.    VALENTE was Rx'ed 10/7/2024.  Started VALENTE and took x 2 months, but period was heavy on it.  Timing was right, but was having to change pads hourly.  So far off the pill, only 1 heavy day, requiring change every 2-4hrs.    Has Von Willebrand Disease, Type 2a, follows with Dr. Juan Young  Previously gained weight on Depo.  Not interested in an IUD.  Not breastfeeding, baby is 10 months.  Only did 3 months.  No dark hair growth / acne.    G'sP's:   LMP: Patient's last menstrual period was 02/15/2025 (exact date).  Relationship: boyfriend for 5yrs  Contraception: condoms  PAP Hx:  only had one:  LAST PAP: ASCUS / HPV Negative / Date: 2023   HPV Vaccine: complete     OBJECTIVE   PHYSICAL EXAM  Vitals:    25 1000   BP: 132/84   Resp: 17     GEN = alert/oriented, nad, pleasant  HEENT = sclera anicteric, EOM grossly normal  BREASTS = deferred, no concerns  CV = BP and HR as per vitals  PULM =  normal respiratory effort   = deferred, no concerns    LABS AND RADS    Lab Results   Component Value Date    PREGNANCYTES Negative 2025     HISTORY   Date taken or verified: 2025     GYNECOLOGIC HISTORY  PAP Hx: only had one: ASCUS / HPV Negative / Date: 2023   Genital HSV: No  Other STD Hx:  chlamydia  2023    Menarche: 12yoa  Bleeding -- #Days Bleedin / # Heavy Days: 1 / Product Change on heavy days: q2-4hrs / Intermenstrual Bleeding: No / Cycle: regular every 28-30 days  Pain -- Dysmenorrhea: typical or expected menstrual cramps / Non-menstrual pelvic pain: No / Dyspareunia: denies  Other -- Vasomotor Sxs: denies / Vaginal dryness: denies    OBSTETRIC HISTORY  Living children: 1  C-sections: 1  Baby boy Thelma    SOCIAL HISTORY  Lives with: BF and baby  Smoker: vapes nicotine / Alcohol: denies / Drugs: denies  Domestic Violence: No  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

## 2025-04-03 ENCOUNTER — TELEPHONE (OUTPATIENT)
Dept: OBSTETRICS AND GYNECOLOGY | Facility: CLINIC | Age: 25
End: 2025-04-03
Payer: MEDICAID

## 2025-04-03 NOTE — TELEPHONE ENCOUNTER
----- Message from Jeromy sent at 4/3/2025  4:34 PM CDT -----  Contact: Self  Type:  Patient Returning CallWho Called:  PatientWhtevin Left Message for Patient:  Sourav the patient know what this is regarding?:  YesBest Call Back Number:  [phone] Additional Information:   States she does not want to go to ED. Please call

## 2025-04-03 NOTE — TELEPHONE ENCOUNTER
----- Message from Debra sent at 4/3/2025  1:05 PM CDT -----  Regarding: appointment access  Contact: rosa  .Type:  Sooner Apoointment RequestCaller is requesting a sooner appointment.  Caller declined first available appointment listed below.  Caller will not accept being placed on the waitlist and is requesting a message be sent to doctor.Name of Caller: Rosa When is the first available appointment?Symptoms: no period but having untollerable cramping pain Would the patient rather a call back or a response via [facility]? [facility] Number:[phone] Additional Information:

## 2025-04-03 NOTE — TELEPHONE ENCOUNTER
Spoke with pt to inform her if the pain becomes too severe, report to the ED to be evaluated. Pt advised to reach out to the office if in 3 months, she still doesn't receive her cycle. Pt advised to take Tylenol or Ibuprofen for the pain. Pt states she will go to ED tomorrow. Understanding verbalized.

## 2025-04-11 ENCOUNTER — HOSPITAL ENCOUNTER (EMERGENCY)
Facility: HOSPITAL | Age: 25
Discharge: HOME | End: 2025-04-11
Attending: EMERGENCY MEDICINE
Payer: MEDICAID

## 2025-04-11 ENCOUNTER — TELEPHONE (OUTPATIENT)
Dept: EMERGENCY MEDICINE | Facility: HOSPITAL | Age: 25
End: 2025-04-11

## 2025-04-11 VITALS
HEIGHT: 61 IN | BODY MASS INDEX: 28.32 KG/M2 | OXYGEN SATURATION: 100 % | RESPIRATION RATE: 16 BRPM | TEMPERATURE: 98 F | WEIGHT: 150 LBS | SYSTOLIC BLOOD PRESSURE: 137 MMHG | DIASTOLIC BLOOD PRESSURE: 90 MMHG | HEART RATE: 89 BPM

## 2025-04-11 DIAGNOSIS — R10.2 PELVIC PAIN: ICD-10-CM

## 2025-04-11 DIAGNOSIS — N39.0 URINARY TRACT INFECTION WITHOUT HEMATURIA, SITE UNSPECIFIED: ICD-10-CM

## 2025-04-11 DIAGNOSIS — Z34.90 PREGNANCY, UNSPECIFIED GESTATIONAL AGE: Primary | ICD-10-CM

## 2025-04-11 LAB
ABSOLUTE EOSINOPHIL (SMH): 0.17 K/UL
ABSOLUTE MONOCYTE (SMH): 0.62 K/UL (ref 0.3–1)
ABSOLUTE NEUTROPHIL COUNT (SMH): 5.5 K/UL (ref 1.8–7.7)
ALBUMIN SERPL-MCNC: 5 G/DL (ref 3.5–5.2)
ALP SERPL-CCNC: 60 UNIT/L (ref 55–135)
ALT SERPL-CCNC: 99 UNIT/L (ref 10–44)
ANION GAP (SMH): 11 MMOL/L (ref 8–16)
AST SERPL-CCNC: 51 UNIT/L (ref 10–40)
B-HCG FREE SERPL-ACNC: 888.18 MIU/ML
B-HCG UR QL: POSITIVE
BACTERIA #/AREA URNS AUTO: ABNORMAL /HPF
BASOPHILS # BLD AUTO: 0.05 K/UL
BASOPHILS NFR BLD AUTO: 0.5 %
BILIRUB SERPL-MCNC: 0.7 MG/DL (ref 0.1–1)
BILIRUB UR QL STRIP.AUTO: NEGATIVE
BUN SERPL-MCNC: 8 MG/DL (ref 6–20)
CALCIUM SERPL-MCNC: 9.7 MG/DL (ref 8.7–10.5)
CHLORIDE SERPL-SCNC: 104 MMOL/L (ref 95–110)
CLARITY UR: CLEAR
CO2 SERPL-SCNC: 25 MMOL/L (ref 23–29)
COLOR UR AUTO: YELLOW
CREAT SERPL-MCNC: 0.7 MG/DL (ref 0.5–1.4)
CTP QC/QA: YES
ERYTHROCYTE [DISTWIDTH] IN BLOOD BY AUTOMATED COUNT: 12.4 % (ref 11.5–14.5)
GFR SERPLBLD CREATININE-BSD FMLA CKD-EPI: >60 ML/MIN/1.73/M2
GLUCOSE SERPL-MCNC: 90 MG/DL (ref 70–110)
GLUCOSE UR QL STRIP: NEGATIVE
HCT VFR BLD AUTO: 46.9 % (ref 37–48.5)
HGB BLD-MCNC: 16.1 GM/DL (ref 12–16)
HGB UR QL STRIP: NEGATIVE
IMM GRANULOCYTES # BLD AUTO: 0.05 K/UL (ref 0–0.04)
IMM GRANULOCYTES NFR BLD AUTO: 0.5 % (ref 0–0.5)
KETONES UR QL STRIP: NEGATIVE
LEUKOCYTE ESTERASE UR QL STRIP: ABNORMAL
LYMPHOCYTES # BLD AUTO: 2.99 K/UL (ref 1–4.8)
MCH RBC QN AUTO: 29.5 PG (ref 27–31)
MCHC RBC AUTO-ENTMCNC: 34.3 G/DL (ref 32–36)
MCV RBC AUTO: 86 FL (ref 82–98)
MICROSCOPIC COMMENT: ABNORMAL
NITRITE UR QL STRIP: NEGATIVE
NUCLEATED RBC (/100WBC) (SMH): 0 /100 WBC
PH UR STRIP: 7 [PH]
PLATELET # BLD AUTO: 419 K/UL (ref 150–450)
PMV BLD AUTO: 8.5 FL (ref 9.2–12.9)
POTASSIUM SERPL-SCNC: 3.2 MMOL/L (ref 3.5–5.1)
PROT SERPL-MCNC: 8.2 GM/DL (ref 6–8.4)
PROT UR QL STRIP: ABNORMAL
RBC # BLD AUTO: 5.46 M/UL (ref 4–5.4)
RBC #/AREA URNS AUTO: 1 /HPF
RELATIVE EOSINOPHIL (SMH): 1.8 % (ref 0–8)
RELATIVE LYMPHOCYTE (SMH): 32 % (ref 18–48)
RELATIVE MONOCYTE (SMH): 6.6 % (ref 4–15)
RELATIVE NEUTROPHIL (SMH): 58.6 % (ref 38–73)
RH BLD: NORMAL
SODIUM SERPL-SCNC: 140 MMOL/L (ref 136–145)
SP GR UR STRIP: >=1.03
SQUAMOUS #/AREA URNS AUTO: 8 /HPF
UROBILINOGEN UR STRIP-ACNC: ABNORMAL EU/DL
WBC # BLD AUTO: 9.34 K/UL (ref 3.9–12.7)
WBC #/AREA URNS AUTO: 35 /HPF

## 2025-04-11 PROCEDURE — 86901 BLOOD TYPING SEROLOGIC RH(D): CPT

## 2025-04-11 PROCEDURE — 85025 COMPLETE CBC W/AUTO DIFF WBC: CPT

## 2025-04-11 PROCEDURE — 81003 URINALYSIS AUTO W/O SCOPE: CPT

## 2025-04-11 PROCEDURE — 87086 URINE CULTURE/COLONY COUNT: CPT

## 2025-04-11 PROCEDURE — 84702 CHORIONIC GONADOTROPIN TEST: CPT

## 2025-04-11 PROCEDURE — 99284 EMERGENCY DEPT VISIT MOD MDM: CPT | Mod: 25

## 2025-04-11 PROCEDURE — 82040 ASSAY OF SERUM ALBUMIN: CPT

## 2025-04-11 PROCEDURE — 81025 URINE PREGNANCY TEST: CPT

## 2025-04-11 PROCEDURE — 25000003 PHARM REV CODE 250

## 2025-04-11 RX ORDER — CEPHALEXIN 500 MG/1
500 CAPSULE ORAL 4 TIMES DAILY
Qty: 28 CAPSULE | Refills: 0 | Status: SHIPPED | OUTPATIENT
Start: 2025-04-11 | End: 2025-04-18

## 2025-04-11 RX ORDER — ACETAMINOPHEN 500 MG
1000 TABLET ORAL
Status: COMPLETED | OUTPATIENT
Start: 2025-04-11 | End: 2025-04-11

## 2025-04-11 RX ORDER — POTASSIUM CHLORIDE 20 MEQ/1
40 TABLET, EXTENDED RELEASE ORAL ONCE
Status: COMPLETED | OUTPATIENT
Start: 2025-04-11 | End: 2025-04-11

## 2025-04-11 RX ADMIN — ACETAMINOPHEN 1000 MG: 500 TABLET ORAL at 10:04

## 2025-04-11 RX ADMIN — POTASSIUM CHLORIDE 40 MEQ: 1500 TABLET, EXTENDED RELEASE ORAL at 12:04

## 2025-04-11 NOTE — ED NOTES
Bed: [facility] 01 - A Chair  Expected date:   Expected time:   Means of arrival:   Comments:

## 2025-04-11 NOTE — Clinical Note
"Rosa Riosfany" Kim was seen and treated in our emergency department on 4/11/2025.  She may return to work on 04/17/2025.       If you have any questions or concerns, please don't hesitate to call.      Monique Valle NP"

## 2025-04-11 NOTE — ED PROVIDER NOTES
Encounter Date: 2025       History     Chief Complaint   Patient presents with    Pelvic Pain     Pt c/o lower abdominal pain into pelvic area x 1 week. States she hasn't had in a cycle in a few months.      Patient is a 24 y.o. female with past medical history of von Willebrand's disease, hereditary factor 8 deficiency who presents to ED via self for concern for pelvic pain which began 1 week(s) ago. Patient reports she had lower pelvic pain mostly in the left side that comes and goes x1 week.  Patient reports her last menstrual cycle was .  Patient reports she went to her gynecologist in March and they told her she had a negative urine pregnancy test.  Patient reports prior to that she had positive home pregnancy test in his negative home pregnancy test.  Patient denies any vaginal bleeding or vaginal discharge.  Patient denies dysuria.  Patient denies any nausea vomiting or diarrhea.  Patient has had nosebleeds over the last 2 days that are easily controllable.  Patient denies using any hormonal birth control at this time. patient is awake and alert in no acute distress.         Review of patient's allergies indicates:  No Known Allergies  Past Medical History:   Diagnosis Date    Hereditary factor VIII deficiency     Von Willebrand disease      Past Surgical History:   Procedure Laterality Date     SECTION N/A 2024    Procedure:  SECTION;  Surgeon: Gwendolyn Luevano MD;  Location: [facility];  Service: OB/GYN;  Laterality: N/A;     No family history on file.  Social History[1]  Review of Systems   Constitutional: Negative.  Negative for fever.   HENT: Negative.     Respiratory: Negative.  Negative for shortness of breath.    Cardiovascular: Negative.  Negative for chest pain.   Gastrointestinal:  Positive for abdominal pain. Negative for diarrhea, nausea and vomiting.   Genitourinary:  Positive for pelvic pain. Negative for dysuria, vaginal bleeding, vaginal discharge and  vaginal pain.   Musculoskeletal:  Negative for back pain, neck pain and neck stiffness.   Skin:  Negative for color change, pallor and rash.   Neurological:  Negative for weakness.   Hematological:  Does not bruise/bleed easily.       Physical Exam     Initial Vitals [04/11/25 0910]   BP Pulse Resp Temp SpO2   (!) 147/106 99 18 98.4 °F (36.9 °C) 100 %      MAP       --         Physical Exam    Nursing note and vitals reviewed.  Constitutional: She appears well-developed and well-nourished. She is not diaphoretic. No distress.   HENT:   Head: Normocephalic and atraumatic.   Right Ear: External ear normal.   Left Ear: External ear normal.   Nose: Nose normal.   Eyes: EOM are normal.   Neck:   Normal range of motion.  Cardiovascular:  Normal rate, regular rhythm, normal heart sounds and intact distal pulses.     Exam reveals no gallop and no friction rub.       No murmur heard.  Pulmonary/Chest: Breath sounds normal. No respiratory distress. She has no wheezes. She has no rhonchi. She has no rales. She exhibits no tenderness.   Abdominal: Abdomen is soft. She exhibits no distension and no mass. There is abdominal tenderness in the suprapubic area and left lower quadrant. There is no rebound and no guarding.   Musculoskeletal:         General: Normal range of motion.      Cervical back: Normal range of motion.     Neurological: She is alert and oriented to person, place, and time. She has normal strength. GCS score is 15. GCS eye subscore is 4. GCS verbal subscore is 5. GCS motor subscore is 6.   Skin: Skin is warm and dry. Capillary refill takes less than 2 seconds.   Psychiatric: She has a normal mood and affect. Her behavior is normal. Judgment and thought content normal.         ED Course   Procedures  Labs Reviewed   COMPREHENSIVE METABOLIC PANEL - Abnormal       Result Value    Sodium 140      Potassium 3.2 (*)     Chloride 104      CO2 25      Glucose 90      BUN 8      Creatinine 0.7      Calcium 9.7       Protein Total 8.2      Albumin 5.0      Bilirubin Total 0.7      ALP 60      AST 51 (*)     ALT 99 (*)     Anion Gap 11      eGFR >60     URINALYSIS, REFLEX TO URINE CULTURE - Abnormal    Color, UA Yellow      Appearance, UA Clear      Spec Grav UA >=1.030 (*)     pH, UA 7.0      Protein, UA Trace (*)     Glucose, UA Negative      Ketones, UA Negative      Blood, UA Negative      Bilirubin, UA Negative      Urobilinogen, UA 2.0-3.0 (*)     Nitrites, UA Negative      Leukocyte Esterase, UA 3+ (*)    CBC WITH DIFFERENTIAL - Abnormal    WBC 9.34      RBC 5.46 (*)     Hgb 16.1 (*)     Hct 46.9      MCV 86      MCH 29.5      MCHC 34.3      RDW 12.4      Platelet Count 419      MPV 8.5 (*)     Nucleated RBC 0      Neut % 58.6      Lymph % 32.0      Mono % 6.6      Eos % 1.8      Basophil % 0.5      Imm Grans % 0.5      Neut # 5.5      Lymph # 2.99      Mono # 0.62      Eos # 0.17      Baso # 0.05      Imm Grans # 0.05 (*)    URINALYSIS MICROSCOPIC - Abnormal    RBC, UA 1      WBC, UA 35 (*)     Bacteria, UA Few (*)     Squamous Epithelial Cells, UA 8      Microscopic Comment       POCT URINE PREGNANCY - Abnormal    POC Preg Test, Ur Positive (*)      Acceptable Yes     CULTURE, URINE   CBC W/ AUTO DIFFERENTIAL    Narrative:     The following orders were created for panel order CBC W/ AUTO DIFFERENTIAL.  Procedure                               Abnormality         Status                     ---------                               -----------         ------                     CBC with Differential[[id number]]       Abnormal            Final result                 Please view results for these tests on the individual orders.   HCG, QUANTITATIVE    Beta HCG Quant 888.18     GROUP & RH    Group & Rh B POS            Imaging Results              US OB <14 Wks, TransAbd, Single Gestation (Final result)  Result time 04/11/25 11:27:26      Final result by Guillermina Wood MD (04/11/25 11:27:26)                    Impression:      3 mm fluid collection within the me trim which may be and early gestational sac.  Correlation with quantitative beta HCG is recommended.    Free fluid in the cul-de-sac    2 cm complex left ovarian cyst    Normal flow to both ovaries      Electronically signed by: Guillermina Wood  Date:    04/11/2025  Time:    11:27               Narrative:    EXAMINATION:  US OB <14 WEEKS TRANSABDOM, SINGLE GESTATION    CLINICAL HISTORY:  Pelvic and perineal pain    COMPARISON:  None    FINDINGS:  Trans abdominally the uterus is not well visualized due to overlying bowel gas.  The bladder is normal.    Transvaginal ultrasound was performed.  The uterus measures 7.3 x 5.3 x 4.9 cm.    There is minimal the endometrium measures 16 mm.  There is a 3 mm fluid collection within the me trim which may represent small gestational sac.  Correlation with quantitative beta HCG is recommended.    There is free fluid in the cul-de-sac.    The right ovary measures 3.2 x 2.2 x 3.0 cm.  There are normal follicles.  There is normal flow the right ovary.    The left ovary measures 4.7 x 2.4 x 4.4 cm.  There is normal flow the left ovary.  There is a 2 cm complex left ovarian cyst suggestive of hemorrhagic cyst.                                       Medications   acetaminophen tablet 1,000 mg (1,000 mg Oral Given 4/11/25 1019)   potassium chloride SA CR tablet 40 mEq (40 mEq Oral Given 4/11/25 1218)     Medical Decision Making  MDM    Patient presents for emergent evaluation of acute pelvic pain that poses a possible threat to life and/or bodily function.    Differential diagnosis included but not limited to pregnancy, abnormal uterine bleeding, ovarian cyst, ovarian torsion, UTI, electrolyte abnormality.  In the ED patient found to have acute clear lung sounds bilaterally with no increased work of breathing.  Patient has pain to palpation over the suprapubic area and left lower quadrant.  Patient's abdomen is soft.  Patient is awake  and alert in no acute distress.  Patient denies vaginal bleeding or vaginal discharge.  Patient denies dysuria.      Labs significant for CBC significant for WBC 9.34, hemoglobin 16.1, platelets 419, CMP significant for potassium 3.2, AST 51, ALT 99, Rh positive, positive UPT, beta quant 888.18.  UA significant for few bacteria, 35 WBC, 3+ leukocytes, negative nitrites  Imaging significant for ultrasound OB significant for 3 mm fluid collection within the endometrium which may be an early gestational sac, correlate with beta hCG.  Free fluid in the cul-de-sac, 2 cm complex left ovarian cyst.  Normal blood flow to bilateral ovaries.    Discharge MDM  I discussed the patient presentation labs, US findings with ED attending Dr. Tate.    Patient was managed in the ED with oral Tylenol and oral potassium.    The response to treatment was good.  Patient is placed on oral antibiotics for urinary tract infection on UA.  Patient was discharged in stable condition with close follow up with OBGYN as soon as possible as patient is newly pregnant.  Detailed return precautions discussed to return to the ED for any new or worsening concerns such as worsening abdominal pain or vaginal bleeding.  Patient verbalizes understanding.    NP uses Epic and voice recognition software prone to occasional and minor errors that may persist in the medical record.     Amount and/or Complexity of Data Reviewed  Labs: ordered. Decision-making details documented in ED Course.  Radiology: ordered. Decision-making details documented in ED Course.    Risk  OTC drugs.  Prescription drug management.               ED Course as of 04/11/25 1742 Fri Apr 11, 2025   0951 hCG Qualitative, Urine(!): Positive [MP]   1053 Group & Rh: B POS [MP]   1053 RBC(!): 5.46 [MP]   1053 Hemoglobin(!): 16.1 [MP]   1053 Hematocrit: 46.9 [MP]   1126 Beta HCG Quant: 888.18 [MP]   1126 Potassium(!): 3.2 [MP]   1126 AST(!): 51 [MP]   1126 ALT(!): 99 [MP]   1126 Leukocyte  Esterase, UA(!): 3+ [MP]   1126 NITRITE UA: Negative [MP]   1126 Bacteria, UA(!): Few [MP]   1126 WBC, UA(!): 35 [MP]   1152 US OB <14 Wks, TransAbd, Single Gestation  Impression:     3 mm fluid collection within the me trim which may be and early gestational sac.  Correlation with quantitative beta HCG is recommended.     Free fluid in the cul-de-sac     2 cm complex left ovarian cyst     Normal flow to both ovaries   [MP]      ED Course User Index  [MP] Monique Valle NP                           Clinical Impression:  Final diagnoses:  [R10.2] Pelvic pain  [Z34.90] Pregnancy, unspecified gestational age (Primary)  [N39.0] Urinary tract infection without hematuria, site unspecified          ED Disposition Condition    Discharge Stable          ED Prescriptions       Medication Sig Dispense Start Date End Date Auth. Provider    cephALEXin (KEFLEX) 500 MG capsule Take 1 capsule (500 mg total) by mouth 4 (four) times daily. for 7 days 28 capsule 2025 Monique Valle NP          Follow-up Information       Follow up With Specialties Details Why Contact Info Additional Information    Jie Montes MD Obstetrics and Gynecology Schedule an appointment as soon as possible for a visit  For recheck/continuing care [address] [facility] [address]  [facility] [address]  [phone]       [facility] - Emergency Dept Emergency Medicine  If symptoms worsen [address] [facility] [id number] [phone] 1st floor                 [1]   Social History  Tobacco Use    Smoking status: Former     Current packs/day: 0.00     Types: Cigarettes, Vaping with nicotine     Quit date: 2023     Years since quittin.3    Smokeless tobacco: Never   Substance Use Topics    Alcohol use: Never    Drug use: Never        Monique Valle NP  [phone]

## 2025-04-11 NOTE — DISCHARGE INSTRUCTIONS
Please follow up with your OB as soon as possible for further evaluation and rechecked.  Your urine pregnancy test, labs, and ultrasound show that you were newly pregnant.  Take Tylenol as needed for pain.  Do not take any ibuprofen.    Please return to the ED for worsening abdominal pain, vaginal bleeding, fever, or any new or worsening concerns.

## 2025-04-13 LAB — BACTERIA UR CULT: NORMAL

## 2025-04-16 ENCOUNTER — PATIENT MESSAGE (OUTPATIENT)
Dept: MATERNAL FETAL MEDICINE | Facility: CLINIC | Age: 25
End: 2025-04-16
Payer: MEDICAID

## 2025-04-16 ENCOUNTER — TELEPHONE (OUTPATIENT)
Dept: OBSTETRICS AND GYNECOLOGY | Facility: CLINIC | Age: 25
End: 2025-04-16

## 2025-04-16 NOTE — TELEPHONE ENCOUNTER
----- Message from Jaja sent at 4/16/2025  9:15 AM CDT -----  Type:  Needs Medical AdviceWho Called:  pt Would the patient rather a call back or a response via [facility]?  Call back Best Call Back Number:  [phone] Information:  pt needs to request to get rescheduled for the appointment that is scheduled for 04/29/25 pt will be out of town so she is requesting a date sooner are after that date

## 2025-04-16 NOTE — TELEPHONE ENCOUNTER
Spoke with pt to schedule preg confirmation appt with Radha. LMP 2/15/25. Appt scheduled for 5/2/25. Pt verbalized understanding.

## 2025-04-21 ENCOUNTER — TELEPHONE (OUTPATIENT)
Dept: OBSTETRICS AND GYNECOLOGY | Facility: CLINIC | Age: 25
End: 2025-04-21
Payer: MEDICAID

## 2025-07-16 ENCOUNTER — TELEPHONE (OUTPATIENT)
Dept: MATERNAL FETAL MEDICINE | Facility: CLINIC | Age: 25
End: 2025-07-16
Payer: MEDICAID

## 2025-07-16 ENCOUNTER — PATIENT MESSAGE (OUTPATIENT)
Dept: MATERNAL FETAL MEDICINE | Facility: CLINIC | Age: 25
End: 2025-07-16
Payer: MEDICAID

## 2025-07-16 DIAGNOSIS — O09.892 H/O PRETERM DELIVERY, CURRENTLY PREGNANT, SECOND TRIMESTER: Primary | ICD-10-CM

## 2025-07-16 NOTE — TELEPHONE ENCOUNTER
Left patient a message to return my call at [phone].    ----- Message from Diana sent at 7/15/2025  4:56 PM CDT -----  Regarding: MFM Referral  Good Morning,     MFM referral/records for above patient has been scanned into  for your review.   Please contact patient to schedule appointment.       Thank you,   [facility]

## 2025-07-21 ENCOUNTER — PROCEDURE VISIT (OUTPATIENT)
Dept: MATERNAL FETAL MEDICINE | Facility: CLINIC | Age: 25
End: 2025-07-21
Payer: MEDICAID

## 2025-07-21 ENCOUNTER — TELEPHONE (OUTPATIENT)
Dept: MATERNAL FETAL MEDICINE | Facility: CLINIC | Age: 25
End: 2025-07-21
Payer: MEDICAID

## 2025-07-21 ENCOUNTER — OFFICE VISIT (OUTPATIENT)
Dept: MATERNAL FETAL MEDICINE | Facility: CLINIC | Age: 25
End: 2025-07-21
Attending: OBSTETRICS & GYNECOLOGY
Payer: MEDICAID

## 2025-07-21 VITALS
BODY MASS INDEX: 30.57 KG/M2 | DIASTOLIC BLOOD PRESSURE: 88 MMHG | HEIGHT: 61 IN | WEIGHT: 161.94 LBS | SYSTOLIC BLOOD PRESSURE: 133 MMHG

## 2025-07-21 DIAGNOSIS — D68.00 VON WILLEBRAND DISEASE: ICD-10-CM

## 2025-07-21 DIAGNOSIS — O09.892 H/O PRETERM DELIVERY, CURRENTLY PREGNANT, SECOND TRIMESTER: ICD-10-CM

## 2025-07-21 DIAGNOSIS — Z36.86 ENCOUNTER FOR ANTENATAL SCREENING FOR CERVICAL LENGTH: Primary | ICD-10-CM

## 2025-07-21 PROCEDURE — 76811 OB US DETAILED SNGL FETUS: CPT | Mod: PBBFAC | Performed by: OBSTETRICS & GYNECOLOGY

## 2025-07-21 PROCEDURE — 99999 PR PBB SHADOW E&M-EST. PATIENT-LVL III: CPT | Mod: PBBFAC,,, | Performed by: OBSTETRICS & GYNECOLOGY

## 2025-07-21 RX ORDER — PROGESTERONE 200 MG/1
200 CAPSULE ORAL NIGHTLY
Qty: 30 CAPSULE | Refills: 11 | Status: SHIPPED | OUTPATIENT
Start: 2025-07-21

## 2025-07-21 NOTE — PROGRESS NOTES
Chief complaint: Hx PTD with abruption, VWD    Provider requesting consultation: Dr. Armas    24 y.o. [id number] 18w6d EGA.    PMH:  Past Medical History:   Diagnosis Date    Hereditary factor VIII deficiency     Von Willebrand disease        PObHx:  OB History    Para Term  AB Living   2 1  1  1   SAB IAB Ectopic Multiple Live Births      0 1      # Outcome Date GA Lbr Graham/2nd Weight Sex Type Anes PTL Lv   2 Current            1  24 32w1d  1.899 kg (4 lb 3 oz) M CS-LTranv Gen Y KIRSTIN       PSH:  Past Surgical History:   Procedure Laterality Date     SECTION N/A 2024    Procedure:  SECTION;  Surgeon: Gwendolyn Luevano MD;  Location: [facility] L[facility];  Service: OB/GYN;  Laterality: N/A;       Family history:family history is not on file.    Social history: reports that she quit smoking about 19 months ago. Her smoking use included cigarettes and vaping with nicotine. She has never used smokeless tobacco. She reports that she does not drink alcohol and does not use drugs.    A detailed fetal anatomical ultrasound was completed today.  [facility] details [facility] section of [facility].    A detailed fetal anatomic ultrasound examination was performed for the following high risk indication: VWD, history abruption.   No fetal structural malformations are identified; however, fetal imaging is incomplete today.   A follow-up study will be scheduled to complete the fetal anatomic survey.   Fetal size today is consistent with established gestational age.   Cervical length by TV scanning is normal.   Placental location is anterior without evidence of previa.       Von Willebrand Disease  Von Willebrand disease is a genetic disease that is most often inherited as an autosomal dominant condition.  It is characterized by abnormal bleeding of varying severity, depending on particular subtype. The disease has multiple subtypes, all of which involve a quantitative reduction and/or a qualitative  abnormality of von Willebrand factor (vWF). Hematology involvement to help discern the type of vWD present is important in predicting clinical risk, counseling regarding inheritance risk, and management options to reduce bleeding complications. Von Willebrand factor (vWF) typically increases in pregnancy and complications in the antepartum period are rare.    I counseled the patient regarding the maternal and fetal risks of vWD in pregnancy including but not limited to the risk of maternal bleeding complications (hemorrhage or anesthetic complications), genetic inheritance, and risk of  bleeding/hemorrhage. Shortly after delivery, vWF falls quickly and can lead to immediate or delayed postpartum hemorrhage.  A high risk time for bleeding is approximately 1-2 weeks postpartum when FVIII and ristocetin co-factor levels drop to prepregnancy levels. Therapeutic options for vWD depend on the subtype by usually DDAVP and factor VIII/vWF concentrates. Plasma exchange, high dose-intravenous immunoglobulin, and activated factor VII may be necessary but should be used under the direction of Hematology/Oncology.      Recommendations:  Close follow-up with a hematologist throughout the pregnancy.  Patient counseled regarding AD/AR inheritance risk  If amniocentesis is desired, vWF:RCo and FVIII levels should be > 50 IU/dL  Obtain baseline CBC, PT/PTT, fibrinogen, vWF antigen level, vWF-Ristocetin cofactor activity (vWF:RCo) and factor VIII levels  Repeat vWF antigen level, vWF:RCo activity, and factor VIII levels every trimester, at 36 weeks, and when presents for delivery; goal levels for vWF:RCo and FVIII >= 50 IU/dL   Repeat values should be checked daily in the postpartum period if excessive bleeding occurs or at the direction of Hematology/Oncology (particularly for type 2 subtypes).  Confirm with lab regarding turnaround time for vWF:RCo, vWF antigen assay, and factor VIII levels. At some institutions, these are  send-out labs; if a significant delay is expected, consult with Hematology/Oncology to best determine the usefulness of serial levels  Anesthesia consult in the third trimester to address option for neuraxial anesthesia  Mode of delivery per obstetric indications. Avoid operative vaginal delivery, routine episiotomy, pudendal block, and fetal scalp electrode if possible  Type and crossmatch for packed red blood cells on admission to L&D    If male infant and desires circumcision, defer until tested for vWD  Notify pediatrics of maternal condition  Postpartum monitoring  Values of vWF:RCo and FVIII should be > 50 IU/dL for at least 3-5 days  Frequent monitoring regarding hemorrhage risk is recommended for at least 2 weeks postpartum  Medications/products  Optimal medication use should be directed by Hematology/Oncology as some subtypes will not respond to DDAVP and usually is not recommended for patients with type 2B subtype  DDAVP is administered as 0.3ug/kg in 50mL of saline over 30 minutes IV 30-60 minutes prior to vaginal or  delivery (Stimate nasal spray is no longer manufactured).  DDAVP can be repeated 12 and 24 hours post partum if needed (not to exceed 2-3 days).  IV is preferred for prophylaxis for surgical bleeding. Water should be decreased to avoid hyponatremia, and the patient should have a normal serum sodium level prior to administration. Avoid hypotonic solutions at time of delivery (normal saline should be used). DDAVP should not be administered in patients with unstable coronary artery disease, pre-eclampsia, peripheral vascular disease, or increased risk of thrombosis. Check serum sodium every 6-8 hours while DDAVP is administered.  If the patient develops major bleeding complications or if her vWF:RCo activity is <50 IU/dL, consider giving vWF concentrate (initial dose 40-60 IU/kg followed by 20-40 IU/kg q 12-24 hrs to keep the vWF-RCo  IU/dL and FVIII levels   IU/dL)-daily levels should be followed and high levels of FVIII should be avoided due to risk of VTE.  VTE prophylaxis per routine indication  Discharge with TXA 1 g every 8 hr x 10-14 days  Cryoprecipitate or fresh frozen plasma can also be given as needed for hemorrhage.  Avoid NSAIDs     The patient is also referred for a history of  delivery at 32 weeks.  This was accompanied by a large amount of bleeding thought to be secondary to abruption.  At the time of consultation no records concerning that delivery were available.  Per the patient history there was no evidence of abruption on the placenta at time of delivery or on the pathology.   Given the uncertainty I would institute cervical surveillance as if the risk were  labor. I counseled the patient regarding her obstetric history which is remarkable for a history of spontaneous  birth/PPROM at 32 weeks with subsequent delivery at 32 weeks gestation. Recent evidence suggests that previously recommended formulations of progesterone therapy (17-alpha hydroxyprogesterone caproate) is ineffective for the prevention of recurrent  birth, and it has been withdrawn from the market at the direction of the FDA. Treatment with 17-alpha hydroxyprogesterone caproate (or its generic or compounded formulations) for prevention of recurrent  birth is not recommended. The MFM section continues to recommend cervical length screening between 16 - 22 weeks 6 days in vora gestations to identify patients who may benefit from cervical cerclage placement. The benefit of vaginal progesterone for the prevention of  birth is unclear in the absence of cervical shortening. In patients with a prior  birth and a cervical length of 25-30 mm, there is a non-significant trend to reduction in  birth, whereas in those with a cervical length < 25 mm, there appears to consistently be a reduction in the risk of premature birth.  Despite the  lack of evidence that vaginal progesterone supplementation, in the form of prometrium 200 mg qhs, is beneficial in the absence of a short cervix, it may be considered after MFM consultation depending on the patient history and past use of 17-P. Whether there is additional incremental benefit of vaginal progesterone for patients who receive an ultrasound indicated cerclage remains unclear. The decision of whether to initiate or continue vaginal progesterone can be individualized.    Recommendations:  Initiate cervical length surveillance every 2 weeks, starting at 16 weeks gestation and continuing until 22 weeks 6 days gestation  If the cervix measures <30 mm, perform weekly cervical length  Patients with a cervical length of 25 - 30 mm with a prior spontaneous PTB may benefit from vaginal progesterone, but this is less clear. Consider initiation of vaginal progesterone.  Patients with a cervical length < 25 mm may be offered cerclage, vaginal progesterone, or both after counseling with MFM.   Once a patient has a cerclage, no additional cervical length measurement is routinely recommended    The patient was given an opportunity to ask questions about management and the diease process.  She expressed an understanding of and agreement to the above impression and plan. All questions were answered to her satisfaction.  She was given contact information to the MFM clinic to address further concerns.

## 2025-07-21 NOTE — TELEPHONE ENCOUNTER
Call to patient and explained the late policy. She stated she should not be 15 min late now, explained would let the office know.   
Warm/Dry

## 2025-07-22 DIAGNOSIS — D68.00 VON WILLEBRAND DISEASE: Primary | ICD-10-CM

## 2025-07-23 ENCOUNTER — LAB VISIT (OUTPATIENT)
Dept: LAB | Facility: HOSPITAL | Age: 25
End: 2025-07-23
Payer: MEDICAID

## 2025-07-23 ENCOUNTER — PATIENT MESSAGE (OUTPATIENT)
Dept: HEMATOLOGY/ONCOLOGY | Facility: CLINIC | Age: 25
End: 2025-07-23
Payer: MEDICAID

## 2025-07-23 DIAGNOSIS — D68.00 VON WILLEBRAND DISEASE: ICD-10-CM

## 2025-07-23 LAB
ABSOLUTE EOSINOPHIL (OHS): 0.07 K/UL
ABSOLUTE MONOCYTE (OHS): 0.41 K/UL (ref 0.3–1)
ABSOLUTE NEUTROPHIL COUNT (OHS): 6.89 K/UL (ref 1.8–7.7)
BASOPHILS # BLD AUTO: 0.02 K/UL
BASOPHILS NFR BLD AUTO: 0.2 %
ERYTHROCYTE [DISTWIDTH] IN BLOOD BY AUTOMATED COUNT: 12.7 % (ref 11.5–14.5)
FIBRINOGEN PPP-MCNC: 385 MG/DL (ref 182–400)
HCT VFR BLD AUTO: 38 % (ref 37–48.5)
HGB BLD-MCNC: 13.2 GM/DL (ref 12–16)
IMM GRANULOCYTES # BLD AUTO: 0.05 K/UL (ref 0–0.04)
IMM GRANULOCYTES NFR BLD AUTO: 0.5 % (ref 0–0.5)
INR PPP: 1 (ref 0.8–1.2)
LYMPHOCYTES # BLD AUTO: 1.72 K/UL (ref 1–4.8)
MCH RBC QN AUTO: 30.3 PG (ref 27–31)
MCHC RBC AUTO-ENTMCNC: 34.7 G/DL (ref 32–36)
MCV RBC AUTO: 87 FL (ref 82–98)
NUCLEATED RBC (/100WBC) (OHS): 0 /100 WBC
PLATELET # BLD AUTO: 343 K/UL (ref 150–450)
PMV BLD AUTO: 9.1 FL (ref 9.2–12.9)
PROTHROMBIN TIME: 10.6 SECONDS (ref 9–12.5)
RBC # BLD AUTO: 4.36 M/UL (ref 4–5.4)
RELATIVE EOSINOPHIL (OHS): 0.8 %
RELATIVE LYMPHOCYTE (OHS): 18.8 % (ref 18–48)
RELATIVE MONOCYTE (OHS): 4.5 % (ref 4–15)
RELATIVE NEUTROPHIL (OHS): 75.2 % (ref 38–73)
WBC # BLD AUTO: 9.16 K/UL (ref 3.9–12.7)

## 2025-07-23 PROCEDURE — 85384 FIBRINOGEN ACTIVITY: CPT

## 2025-07-23 PROCEDURE — 85240 CLOT FACTOR VIII AHG 1 STAGE: CPT

## 2025-07-23 PROCEDURE — 85610 PROTHROMBIN TIME: CPT

## 2025-07-23 PROCEDURE — 85025 COMPLETE CBC W/AUTO DIFF WBC: CPT | Mod: PO

## 2025-07-23 PROCEDURE — 85246 CLOT FACTOR VIII VW ANTIGEN: CPT

## 2025-07-23 PROCEDURE — 85397 CLOTTING FUNCT ACTIVITY: CPT

## 2025-07-23 PROCEDURE — 85730 THROMBOPLASTIN TIME PARTIAL: CPT

## 2025-07-23 PROCEDURE — 36415 COLL VENOUS BLD VENIPUNCTURE: CPT | Mod: PO

## 2025-07-24 PROBLEM — Z36.86 ENCOUNTER FOR ANTENATAL SCREENING FOR CERVICAL LENGTH: Status: ACTIVE | Noted: 2025-07-24

## 2025-07-24 LAB
APTT PPP: 29.4 SECONDS (ref 21–32)
FACT VIII ACT/NOR PPP: 108 % (ref 60–170)

## 2025-07-26 LAB
VWF AG ACT/NOR PPP IA: 98 % (ref 55–200)
VWF:AC ACT/NOR PPP IA: 62 % (ref 55–200)

## 2025-08-05 ENCOUNTER — PROCEDURE VISIT (OUTPATIENT)
Dept: MATERNAL FETAL MEDICINE | Facility: CLINIC | Age: 25
End: 2025-08-05
Payer: MEDICAID

## 2025-08-05 ENCOUNTER — OFFICE VISIT (OUTPATIENT)
Dept: MATERNAL FETAL MEDICINE | Facility: CLINIC | Age: 25
End: 2025-08-05
Payer: MEDICAID

## 2025-08-05 VITALS
WEIGHT: 163 LBS | BODY MASS INDEX: 30.78 KG/M2 | DIASTOLIC BLOOD PRESSURE: 82 MMHG | SYSTOLIC BLOOD PRESSURE: 124 MMHG | HEIGHT: 61 IN

## 2025-08-05 DIAGNOSIS — Z36.86 ENCOUNTER FOR ANTENATAL SCREENING FOR CERVICAL LENGTH: ICD-10-CM

## 2025-08-05 DIAGNOSIS — O09.899 HISTORY OF PRETERM DELIVERY, CURRENTLY PREGNANT: ICD-10-CM

## 2025-08-05 DIAGNOSIS — D68.00 VON WILLEBRAND DISEASE: Primary | ICD-10-CM

## 2025-08-05 PROCEDURE — 76817 TRANSVAGINAL US OBSTETRIC: CPT | Mod: PBBFAC | Performed by: OBSTETRICS & GYNECOLOGY

## 2025-08-05 PROCEDURE — 99212 OFFICE O/P EST SF 10 MIN: CPT | Mod: PBBFAC,TH | Performed by: OBSTETRICS & GYNECOLOGY

## 2025-08-05 PROCEDURE — 99999 PR PBB SHADOW E&M-EST. PATIENT-LVL II: CPT | Mod: PBBFAC,,, | Performed by: OBSTETRICS & GYNECOLOGY

## 2025-08-05 PROCEDURE — 76816 OB US FOLLOW-UP PER FETUS: CPT | Mod: PBBFAC | Performed by: OBSTETRICS & GYNECOLOGY

## 2025-08-05 NOTE — ASSESSMENT & PLAN NOTE
Please see prior notes for full counseling and recommendations. See notes from prior pregnancy with detailed plans on vWF management.    Recommendations:  Close follow-up with a hematologist throughout the pregnancy.  Obtain baseline CBC, PT/PTT, fibrinogen, vWF antigen level, vWF-Ristocetin cofactor activity (vWF:RCo) and factor VIII levels  Repeat vWF antigen level, vWF:RCo activity, and factor VIII levels every trimester (done 7/23/25), at 36 weeks, and when presents for delivery; goal levels for vWF:RCo and FVIII >= 50 IU/dL  Anesthesia consult in the third trimester to address option for neuraxial anesthesia  Type and crossmatch for packed red blood cells on admission to L&D  Postpartum monitoring  Values of vWF:RCo and FVIII should be > 50 IU/dL for at least 3-5 days  Frequent monitoring regarding hemorrhage risk is recommended for at least 2 weeks postpartum  Medications/products: Please see prior notes and Heme/Onc recommendations:

## 2025-08-05 NOTE — ASSESSMENT & PLAN NOTE
She has a history of PTD at 32 weeks likely related to placental abruption. However, per the patient there was no evidence of abruption on the placenta at the time of delivery or on the placental pathology. Please see prior notes for full counseling and recommendations.    CL normal today    Recommendations:  Continue cervical length surveillance every 2 weeks, starting at 16 weeks gestation and continuing until 22 weeks 6 days gestation  If the cervix measures <30 mm, perform weekly cervical length  Patients with a cervical length of 25 - 30 mm with a prior spontaneous PTB may benefit from vaginal progesterone, but this is less clear. Consider initiation of vaginal progesterone.  Patients with a cervical length < 25 mm may be offered cerclage, vaginal progesterone, or both after counseling with MFM.   Once a patient has a cerclage, no additional cervical length measurement is routinely recommended

## 2025-08-05 NOTE — PROGRESS NOTES
Maternal Fetal Medicine follow up consult    SUBJECTIVE:     Rosa Alvarez is a 24 y.o.  female with IUP at 21w0d who is seen in follow up consultation by MFM.  Pregnancy complications include:   No problems updated.    Previous notes reviewed.   No changes to medical, surgical, family, social, or obstetric history.    Interval history since last MFM visit: No major changes. She saw hematology recently.    Medications reviewed.    Care team members:  Dr. Armas - Primary [facility]     OBJECTIVE:   LMP 01/15/2025 (Exact Date)     Physical Exam    Ultrasound performed. See viewpoint for full ultrasound report.  Transvaginal cervical length measures normal at 34 mm   Sanchez IUP with cardiac activity is identified.  The fetal anatomic survey was completed today and no fetal structural abnormalities were noted.   MVP is normal.       ASSESSMENT/PLAN:     24 y.o.  female with IUP at 21w0d    Assessment & Plan  Von Willebrand disease  Please see prior notes for full counseling and recommendations. See notes from prior pregnancy with detailed plans on vWF management.    Recommendations:  Close follow-up with a hematologist throughout the pregnancy.  Obtain baseline CBC, PT/PTT, fibrinogen, vWF antigen level, vWF-Ristocetin cofactor activity (vWF:RCo) and factor VIII levels  Repeat vWF antigen level, vWF:RCo activity, and factor VIII levels every trimester (done 25), at 36 weeks, and when presents for delivery; goal levels for vWF:RCo and FVIII >= 50 IU/dL  Anesthesia consult in the third trimester to address option for neuraxial anesthesia  Type and crossmatch for packed red blood cells on admission to L&D  Postpartum monitoring  Values of vWF:RCo and FVIII should be > 50 IU/dL for at least 3-5 days  Frequent monitoring regarding hemorrhage risk is recommended for at least 2 weeks postpartum  Medications/products: Please see prior notes and Heme/Onc recommendations:    Encounter for  screening for  cervical length  She has a history of PTD at 32 weeks likely related to placental abruption. However, per the patient there was no evidence of abruption on the placenta at the time of delivery or on the placental pathology. Please see prior notes for full counseling and recommendations.    CL normal today    Recommendations:  Continue cervical length surveillance every 2 weeks, starting at 16 weeks gestation and continuing until 22 weeks 6 days gestation  If the cervix measures <30 mm, perform weekly cervical length  Patients with a cervical length of 25 - 30 mm with a prior spontaneous PTB may benefit from vaginal progesterone, but this is less clear. Consider initiation of vaginal progesterone.  Patients with a cervical length < 25 mm may be offered cerclage, vaginal progesterone, or both after counseling with MFM.   Once a patient has a cerclage, no additional cervical length measurement is routinely recommended       F/u on 8/15 weeks for MFM visit  F/u on 8/15 weeks for US    Ezra Howell Jr., MD  Maternal Fetal Medicine

## 2025-08-12 ENCOUNTER — TELEPHONE (OUTPATIENT)
Dept: HEMATOLOGY/ONCOLOGY | Facility: CLINIC | Age: 25
End: 2025-08-12
Payer: MEDICAID

## 2025-08-12 DIAGNOSIS — D68.00 VON WILLEBRAND DISEASE: Primary | ICD-10-CM

## 2025-08-15 ENCOUNTER — OFFICE VISIT (OUTPATIENT)
Dept: MATERNAL FETAL MEDICINE | Facility: CLINIC | Age: 25
End: 2025-08-15
Attending: OBSTETRICS & GYNECOLOGY
Payer: MEDICAID

## 2025-08-15 ENCOUNTER — PROCEDURE VISIT (OUTPATIENT)
Dept: MATERNAL FETAL MEDICINE | Facility: CLINIC | Age: 25
End: 2025-08-15
Payer: MEDICAID

## 2025-08-15 VITALS
WEIGHT: 162.69 LBS | DIASTOLIC BLOOD PRESSURE: 79 MMHG | BODY MASS INDEX: 30.74 KG/M2 | SYSTOLIC BLOOD PRESSURE: 124 MMHG

## 2025-08-15 DIAGNOSIS — D68.00 VON WILLEBRAND DISEASE: ICD-10-CM

## 2025-08-15 DIAGNOSIS — Z36.86 ENCOUNTER FOR ANTENATAL SCREENING FOR CERVICAL LENGTH: ICD-10-CM

## 2025-08-15 DIAGNOSIS — Z36.86 ENCOUNTER FOR ANTENATAL SCREENING FOR CERVICAL LENGTH: Primary | ICD-10-CM

## 2025-08-15 PROCEDURE — 99999 PR PBB SHADOW E&M-EST. PATIENT-LVL II: CPT | Mod: PBBFAC,,, | Performed by: OBSTETRICS & GYNECOLOGY

## 2025-08-15 PROCEDURE — 99212 OFFICE O/P EST SF 10 MIN: CPT | Mod: PBBFAC,TH | Performed by: OBSTETRICS & GYNECOLOGY

## 2025-08-26 ENCOUNTER — TELEPHONE (OUTPATIENT)
Dept: HEMATOLOGY/ONCOLOGY | Facility: CLINIC | Age: 25
End: 2025-08-26
Payer: MEDICAID